# Patient Record
Sex: FEMALE | Race: AMERICAN INDIAN OR ALASKA NATIVE | Employment: UNEMPLOYED | URBAN - METROPOLITAN AREA
[De-identification: names, ages, dates, MRNs, and addresses within clinical notes are randomized per-mention and may not be internally consistent; named-entity substitution may affect disease eponyms.]

---

## 2017-01-11 ENCOUNTER — ROUTINE PRENATAL (OUTPATIENT)
Dept: OBGYN CLINIC | Age: 22
End: 2017-01-11

## 2017-01-11 VITALS — SYSTOLIC BLOOD PRESSURE: 100 MMHG | DIASTOLIC BLOOD PRESSURE: 58 MMHG | WEIGHT: 96.6 LBS | BODY MASS INDEX: 18.25 KG/M2

## 2017-01-11 DIAGNOSIS — Z34.02 SUPERVISION OF NORMAL FIRST PREGNANCY IN SECOND TRIMESTER: Primary | ICD-10-CM

## 2017-01-11 DIAGNOSIS — R31.9 HEMATURIA: ICD-10-CM

## 2017-01-11 DIAGNOSIS — O36.8120 DECREASED FETAL MOVEMENT, SECOND TRIMESTER, NOT APPLICABLE OR UNSPECIFIED FETUS: ICD-10-CM

## 2017-01-11 NOTE — PROGRESS NOTES
Problem List  Date Reviewed: 12/20/2016          Codes Class Noted    Encounter for supervision of normal first pregnancy ICD-10-CM: Z34.00  ICD-9-CM: V22.0  9/19/2016    Overview Addendum 11/18/2016  1:55 PM by Janell Ortega     EDC 4/8/2017 by [facility]-- E.  Coli at first visit  10/25/16 Flu vaccine given

## 2017-01-11 NOTE — PATIENT INSTRUCTIONS
Weeks 26 to 30 of Your Pregnancy: Care Instructions  Your Care Instructions    You are now in your last trimester of pregnancy. Your baby is growing rapidly. And you'll probably feel your baby moving around more often. Your doctor may ask you to count your baby's kicks. Your back may ache as your body gets used to your baby's size and length. If you haven't already had the Tdap shot during this pregnancy, talk to your doctor about getting it. It will help protect your  against pertussis infection. During this time, it's important to take care of yourself and pay attention to what your body needs. If you feel sexual, explore ways to be close with your partner that match your comfort and desire. Use the tips provided in this care sheet to find ways to be sexual in your own way. Follow-up care is a key part of your treatment and safety. Be sure to make and go to all appointments, and call your doctor if you are having problems. It's also a good idea to know your test results and keep a list of the medicines you take. How can you care for yourself at home? Take it easy at work  · Take frequent breaks. If possible, stop working when you are tired, and rest during your lunch hour. · Take bathroom breaks every 2 hours. · Change positions often. If you sit for long periods, stand up and walk around. · When you stand for a long time, keep one foot on a low stool with your knee bent. After standing a lot, sit with your feet up. · Avoid fumes, chemicals, and tobacco smoke. Be sexual in your own way  · Having sex during pregnancy is okay, unless your doctor tells you not to. · You may be very interested in sex, or you may have no interest at all. · Your growing belly can make it hard to find a good position during intercourse. [place] and explore. · You may get cramps in your uterus when your partner touches your breasts.   · A back rub may relieve the backache or cramps that sometimes follow orgasm. Learn about  labor  · Watch for signs of  labor. You may be going into labor if:  ¨ You have menstrual-like cramps, with or without nausea. ¨ You have about 4 or more contractions in 20 minutes, or about 8 or more within 1 hour, even after you have had a glass of water and are resting. ¨ You have a low, dull backache that does not go away when you change your position. ¨ You have pain or pressure in your pelvis that comes and goes in a pattern. ¨ You have intestinal cramping or flu-like symptoms, with or without diarrhea. ¨ You notice an increase or change in your vaginal discharge. Discharge may be heavy, mucus-like, watery, or streaked with blood. ¨ Your water breaks. · If you think you have  labor:  ¨ Drink 2 or 3 glasses of water or juice. Not drinking enough fluids can cause contractions. ¨ Stop what you are doing, and empty your bladder. Then lie down on your left side for at least 1 hour. ¨ While lying on your side, find your breast bone. Put your fingers in the soft spot just below it. Move your fingers down toward your belly button to find the top of your uterus. Check to see if it is tight. ¨ Contractions can be weak or strong. Record your contractions for an hour. Time a contraction from the start of one contraction to the start of the next one. ¨ Single or several strong contractions without a pattern are called Tripp-Murphy contractions. They are practice contractions but not the start of labor. They often stop if you change what you are doing. ¨ Call your doctor if you have regular contractions. Where can you learn more? Go to http://dorina-britt.info/. Enter X909 in the search box to learn more about \"Weeks 26 to 30 of Your Pregnancy: Care Instructions. \"  Current as of: May 30, 2016  Content Version: 11.1  © 5698-7914 [employer].  Care instructions adapted under license by [employer] (which disclaims liability or warranty for this information). If you have questions about a medical condition or this instruction, always ask your healthcare professional. [place] [address] any warranty or liability for your use of this information.

## 2017-01-11 NOTE — LETTER
Terra Kumar [address] [place] [phone] To Whom it may concern; 
 
 Terra Kumar is under my care. She was seen in our office today for a  
 
pregnancy problem. Following this appointment today, she may not return to work until after her next scheduled appointment on 1/17/17. Respectfully, Janell Ortega, Dr. Marlena Will' nurse

## 2017-01-11 NOTE — PROGRESS NOTES
[facility]  OFFICE PROCEDURE PROGRESS NOTE        Chart reviewed for the following:   Janell URRUTIA, have reviewed the History, Physical and updated the Allergic reactions for [address] performed immediately prior to start of procedure:   Janell URRUTIA, have performed the following reviews on Joey Hernandez prior to the start of the procedure:            * Patient was identified by name and date of birth   * Agreement on procedure being performed was verified  * Risks and Benefits explained to the patient  * Procedure site verified and marked as necessary  * Patient was positioned for comfort  * Consent was signed and verified     Time: 1pm      Date of procedure: 2017    Procedure performed by:  Roger Kawasaki, MD    Patient assisted by: self    How tolerated by patient: tolerated the procedure well with no complications    Post Procedural Pain Scale: 0 - No Hurt    Comments: none  NST procedure note    Joey Hernandez is a ,  23 y.o. female OTHER whose Patient's last menstrual period was 2016. was on  who presents for fetal non-stress test.    She is 27w4d and was monitored for 30 minutes and the FHR was reactive. NST Interpretation:    FHR baseline 150 bpm, variability moderate, accelerations present, decelerations Absent. Uterine contractions were absent. Yeni Martinez was informed of the NST results and her questions were answered. Disposition:    [x] return to clinic as scheduled   .

## 2017-01-11 NOTE — PROGRESS NOTES
EXTRA VISIT:  Complains of lower abdominal cramping, lasts few seconds at a time since last night. Baby moving - moved in office while waiting.  No bleeding  Cervix 1cm  Send urine cx  RTO for fFN  Stop work, pelvic rest

## 2017-01-12 LAB — GTT, 1 HR, GLUCOLA, EXTERNAL: NORMAL

## 2017-01-13 LAB — BACTERIA UR CULT: NO GROWTH

## 2017-01-17 ENCOUNTER — TELEPHONE (OUTPATIENT)
Dept: OBGYN CLINIC | Age: 22
End: 2017-01-17

## 2017-01-17 ENCOUNTER — ROUTINE PRENATAL (OUTPATIENT)
Dept: OBGYN CLINIC | Age: 22
End: 2017-01-17

## 2017-01-17 VITALS — SYSTOLIC BLOOD PRESSURE: 100 MMHG | WEIGHT: 98.2 LBS | BODY MASS INDEX: 18.55 KG/M2 | DIASTOLIC BLOOD PRESSURE: 58 MMHG

## 2017-01-17 DIAGNOSIS — Z3A.28 28 WEEKS GESTATION OF PREGNANCY: ICD-10-CM

## 2017-01-17 DIAGNOSIS — Z23 ENCOUNTER FOR IMMUNIZATION: ICD-10-CM

## 2017-01-17 DIAGNOSIS — Z34.03 SUPERVISION OF NORMAL FIRST PREGNANCY IN THIRD TRIMESTER: Primary | ICD-10-CM

## 2017-01-17 DIAGNOSIS — O34.30: ICD-10-CM

## 2017-01-17 LAB
HCT, EXTERNAL: 36.8
HGB, EXTERNAL: 12.5
PLATELET CNT,   EXTERNAL: 327

## 2017-01-17 RX ORDER — TERCONAZOLE 8 MG/G
1 CREAM VAGINAL
Qty: 20 G | Refills: 0 | Status: SHIPPED | OUTPATIENT
Start: 2017-01-17 | End: 2017-02-02

## 2017-01-17 NOTE — PROGRESS NOTES
Problem List  Date Reviewed: 1/11/2017          Codes Class Noted    Encounter for supervision of normal first pregnancy ICD-10-CM: Z34.00  ICD-9-CM: V22.0  9/19/2016    Overview Addendum 11/18/2016  1:55 PM by Janell Ortega     EDC 4/8/2017 by [facility]-- E.  Coli at first visit  10/25/16 Flu vaccine given

## 2017-01-17 NOTE — PROGRESS NOTES
tdap today. Denies contractions. Baby moving fFN done. Disc peds, consent classes  US 62%tile, nl AFV, vtx    Cervix unchanged.  Yeast in vagina - terazol 3

## 2017-01-17 NOTE — PATIENT INSTRUCTIONS
Weeks 26 to 30 of Your Pregnancy: Care Instructions  Your Care Instructions    You are now in your last trimester of pregnancy. Your baby is growing rapidly. And you'll probably feel your baby moving around more often. Your doctor may ask you to count your baby's kicks. Your back may ache as your body gets used to your baby's size and length. If you haven't already had the Tdap shot during this pregnancy, talk to your doctor about getting it. It will help protect your  against pertussis infection. During this time, it's important to take care of yourself and pay attention to what your body needs. If you feel sexual, explore ways to be close with your partner that match your comfort and desire. Use the tips provided in this care sheet to find ways to be sexual in your own way. Follow-up care is a key part of your treatment and safety. Be sure to make and go to all appointments, and call your doctor if you are having problems. It's also a good idea to know your test results and keep a list of the medicines you take. How can you care for yourself at home? Take it easy at work  · Take frequent breaks. If possible, stop working when you are tired, and rest during your lunch hour. · Take bathroom breaks every 2 hours. · Change positions often. If you sit for long periods, stand up and walk around. · When you stand for a long time, keep one foot on a low stool with your knee bent. After standing a lot, sit with your feet up. · Avoid fumes, chemicals, and tobacco smoke. Be sexual in your own way  · Having sex during pregnancy is okay, unless your doctor tells you not to. · You may be very interested in sex, or you may have no interest at all. · Your growing belly can make it hard to find a good position during intercourse. [place] and explore. · You may get cramps in your uterus when your partner touches your breasts.   · A back rub may relieve the backache or cramps that sometimes follow orgasm. Learn about  labor  · Watch for signs of  labor. You may be going into labor if:  ¨ You have menstrual-like cramps, with or without nausea. ¨ You have about 4 or more contractions in 20 minutes, or about 8 or more within 1 hour, even after you have had a glass of water and are resting. ¨ You have a low, dull backache that does not go away when you change your position. ¨ You have pain or pressure in your pelvis that comes and goes in a pattern. ¨ You have intestinal cramping or flu-like symptoms, with or without diarrhea. ¨ You notice an increase or change in your vaginal discharge. Discharge may be heavy, mucus-like, watery, or streaked with blood. ¨ Your water breaks. · If you think you have  labor:  ¨ Drink 2 or 3 glasses of water or juice. Not drinking enough fluids can cause contractions. ¨ Stop what you are doing, and empty your bladder. Then lie down on your left side for at least 1 hour. ¨ While lying on your side, find your breast bone. Put your fingers in the soft spot just below it. Move your fingers down toward your belly button to find the top of your uterus. Check to see if it is tight. ¨ Contractions can be weak or strong. Record your contractions for an hour. Time a contraction from the start of one contraction to the start of the next one. ¨ Single or several strong contractions without a pattern are called Tripp-Murphy contractions. They are practice contractions but not the start of labor. They often stop if you change what you are doing. ¨ Call your doctor if you have regular contractions. Where can you learn more? Go to http://dorina-britt.info/. Enter A177 in the search box to learn more about \"Weeks 26 to 30 of Your Pregnancy: Care Instructions. \"  Current as of: May 30, 2016  Content Version: 11.1  © 0825-7831 [employer].  Care instructions adapted under license by [employer] (which disclaims liability or warranty for this information). If you have questions about a medical condition or this instruction, always ask your healthcare professional. [place] [address] any warranty or liability for your use of this information.

## 2017-01-17 NOTE — TELEPHONE ENCOUNTER
23year old  28w3d pregnant patient last seen in the office on today. Zakia from lab nnamdi calling to report the patient has a positive fetal fibronectin. This nurse confirmed the office fax number to have the lab faxed to the office. Lab has been faxed to office and scanned into chart. MD notified.

## 2017-01-17 NOTE — PROGRESS NOTES
Administered TDAP vaccine per MD order. Injection given IM in right deltoid, patient consent signed.  Patient tolerated well, no complications

## 2017-01-18 LAB
ERYTHROCYTE [DISTWIDTH] IN BLOOD BY AUTOMATED COUNT: 12.3 % (ref 12.3–15.4)
FIBRONECTIN FETAL VAG QL: POSITIVE
GLUCOSE 1H P 50 G GLC PO SERPL-MCNC: 86 MG/DL (ref 65–139)
HCT VFR BLD AUTO: 36.8 % (ref 34–46.6)
HGB BLD-MCNC: 12.5 G/DL (ref 11.1–15.9)
MCH RBC QN AUTO: 28.7 PG (ref 26.6–33)
MCHC RBC AUTO-ENTMCNC: 34 G/DL (ref 31.5–35.7)
MCV RBC AUTO: 84 FL (ref 79–97)
PLATELET # BLD AUTO: 327 X10E3/UL (ref 150–379)
RBC # BLD AUTO: 4.36 X10E6/UL (ref 3.77–5.28)
WBC # BLD AUTO: 12 X10E3/UL (ref 3.4–10.8)

## 2017-01-24 ENCOUNTER — ROUTINE PRENATAL (OUTPATIENT)
Dept: OBGYN CLINIC | Age: 22
End: 2017-01-24

## 2017-01-24 ENCOUNTER — HOSPITAL ENCOUNTER (INPATIENT)
Age: 22
LOS: 2 days | Discharge: HOME | DRG: 778 | End: 2017-01-26
Attending: OBSTETRICS & GYNECOLOGY | Admitting: OBSTETRICS & GYNECOLOGY
Payer: COMMERCIAL

## 2017-01-24 VITALS — SYSTOLIC BLOOD PRESSURE: 98 MMHG | DIASTOLIC BLOOD PRESSURE: 58 MMHG | WEIGHT: 98.4 LBS | BODY MASS INDEX: 18.59 KG/M2

## 2017-01-24 DIAGNOSIS — R87.89 POSITIVE FETAL FIBRONECTIN AT 22 WEEKS TO 34 WEEKS GESTATION: ICD-10-CM

## 2017-01-24 DIAGNOSIS — O09.899 POSITIVE FETAL FIBRONECTIN AT 22 WEEKS TO 34 WEEKS GESTATION: ICD-10-CM

## 2017-01-24 DIAGNOSIS — Z34.03 SUPERVISION OF NORMAL FIRST PREGNANCY IN THIRD TRIMESTER: Primary | ICD-10-CM

## 2017-01-24 LAB
ERYTHROCYTE [DISTWIDTH] IN BLOOD BY AUTOMATED COUNT: 12.4 % (ref 11.5–14.5)
HCT VFR BLD AUTO: 35.2 % (ref 35–47)
HGB BLD-MCNC: 11.8 G/DL (ref 11.5–16)
MCH RBC QN AUTO: 28.3 PG (ref 26–34)
MCHC RBC AUTO-ENTMCNC: 33.5 G/DL (ref 30–36.5)
MCV RBC AUTO: 84.4 FL (ref 80–99)
PLATELET # BLD AUTO: 299 K/UL (ref 150–400)
RBC # BLD AUTO: 4.17 M/UL (ref 3.8–5.2)
WBC # BLD AUTO: 12.5 K/UL (ref 3.6–11)

## 2017-01-24 PROCEDURE — 36415 COLL VENOUS BLD VENIPUNCTURE: CPT | Performed by: OBSTETRICS & GYNECOLOGY

## 2017-01-24 PROCEDURE — 77030034850

## 2017-01-24 PROCEDURE — 86900 BLOOD TYPING SEROLOGIC ABO: CPT | Performed by: OBSTETRICS & GYNECOLOGY

## 2017-01-24 PROCEDURE — 74011000258 HC RX REV CODE- 258: Performed by: OBSTETRICS & GYNECOLOGY

## 2017-01-24 PROCEDURE — 99218 HC RM OBSERVATION: CPT

## 2017-01-24 PROCEDURE — 87081 CULTURE SCREEN ONLY: CPT | Performed by: OBSTETRICS & GYNECOLOGY

## 2017-01-24 PROCEDURE — 99283 EMERGENCY DEPT VISIT LOW MDM: CPT | Performed by: OBSTETRICS & GYNECOLOGY

## 2017-01-24 PROCEDURE — 59025 FETAL NON-STRESS TEST: CPT | Performed by: OBSTETRICS & GYNECOLOGY

## 2017-01-24 PROCEDURE — 74011250636 HC RX REV CODE- 250/636: Performed by: OBSTETRICS & GYNECOLOGY

## 2017-01-24 PROCEDURE — 85027 COMPLETE CBC AUTOMATED: CPT | Performed by: OBSTETRICS & GYNECOLOGY

## 2017-01-24 PROCEDURE — 65270000029 HC RM PRIVATE

## 2017-01-24 PROCEDURE — 96361 HYDRATE IV INFUSION ADD-ON: CPT | Performed by: OBSTETRICS & GYNECOLOGY

## 2017-01-24 PROCEDURE — 51702 INSERT TEMP BLADDER CATH: CPT

## 2017-01-24 PROCEDURE — 96360 HYDRATION IV INFUSION INIT: CPT | Performed by: OBSTETRICS & GYNECOLOGY

## 2017-01-24 RX ORDER — SODIUM CHLORIDE 0.9 % (FLUSH) 0.9 %
5-10 SYRINGE (ML) INJECTION AS NEEDED
Status: DISCONTINUED | OUTPATIENT
Start: 2017-01-24 | End: 2017-01-26 | Stop reason: HOSPADM

## 2017-01-24 RX ORDER — ACETAMINOPHEN 325 MG/1
650 TABLET ORAL
Status: DISCONTINUED | OUTPATIENT
Start: 2017-01-24 | End: 2017-01-26 | Stop reason: HOSPADM

## 2017-01-24 RX ORDER — MAGNESIUM SULFATE HEPTAHYDRATE 40 MG/ML
4 INJECTION, SOLUTION INTRAVENOUS ONCE
Status: COMPLETED | OUTPATIENT
Start: 2017-01-24 | End: 2017-01-24

## 2017-01-24 RX ORDER — SODIUM CHLORIDE, SODIUM LACTATE, POTASSIUM CHLORIDE, CALCIUM CHLORIDE 600; 310; 30; 20 MG/100ML; MG/100ML; MG/100ML; MG/100ML
999 INJECTION, SOLUTION INTRAVENOUS ONCE
Status: COMPLETED | OUTPATIENT
Start: 2017-01-24 | End: 2017-01-24

## 2017-01-24 RX ORDER — SODIUM CHLORIDE 0.9 % (FLUSH) 0.9 %
5-10 SYRINGE (ML) INJECTION EVERY 8 HOURS
Status: DISCONTINUED | OUTPATIENT
Start: 2017-01-24 | End: 2017-01-26 | Stop reason: HOSPADM

## 2017-01-24 RX ORDER — SODIUM CHLORIDE, SODIUM LACTATE, POTASSIUM CHLORIDE, CALCIUM CHLORIDE 600; 310; 30; 20 MG/100ML; MG/100ML; MG/100ML; MG/100ML
125 INJECTION, SOLUTION INTRAVENOUS ONCE
Status: COMPLETED | OUTPATIENT
Start: 2017-01-24 | End: 2017-01-24

## 2017-01-24 RX ORDER — BETAMETHASONE SODIUM PHOSPHATE AND BETAMETHASONE ACETATE 3; 3 MG/ML; MG/ML
12 INJECTION, SUSPENSION INTRA-ARTICULAR; INTRALESIONAL; INTRAMUSCULAR; SOFT TISSUE EVERY 12 HOURS
Status: DISCONTINUED | OUTPATIENT
Start: 2017-01-24 | End: 2017-01-25 | Stop reason: SDUPTHER

## 2017-01-24 RX ADMIN — SODIUM CHLORIDE, SODIUM LACTATE, POTASSIUM CHLORIDE, AND CALCIUM CHLORIDE 999 ML/HR: 600; 310; 30; 20 INJECTION, SOLUTION INTRAVENOUS at 16:24

## 2017-01-24 RX ADMIN — SODIUM CHLORIDE, SODIUM LACTATE, POTASSIUM CHLORIDE, AND CALCIUM CHLORIDE 125 ML/HR: 600; 310; 30; 20 INJECTION, SOLUTION INTRAVENOUS at 17:21

## 2017-01-24 RX ADMIN — BETAMETHASONE ACETATE AND BETAMETHASONE SODIUM PHOSPHATE 12 MG: 3; 3 INJECTION, SUSPENSION INTRA-ARTICULAR; INTRALESIONAL; INTRAMUSCULAR; SOFT TISSUE at 20:12

## 2017-01-24 RX ADMIN — MAGNESIUM SULFATE HEPTAHYDRATE 4 G: 40 INJECTION, SOLUTION INTRAVENOUS at 20:10

## 2017-01-24 RX ADMIN — AMPICILLIN SODIUM 2 G: 2 INJECTION, POWDER, FOR SOLUTION INTRAMUSCULAR; INTRAVENOUS at 20:10

## 2017-01-24 RX ADMIN — Medication 2 G/HR: at 20:33

## 2017-01-24 NOTE — PROGRESS NOTES
1/24/2017 2:26 PM  Patient presents from office with a +FFN result. Patient denies any recent intercourse, LOF or bleeding. Patient states she has +FM. Pt denies any complications with this current pregnancy. Patient oriented to room and POC and agrees. Pt given pitcher full of water and encouraged to PO hydrate. 1/24/2017 3:58 PM  Patient asking when/if she will be discharged. Paged Dr. Celestino Isabel. 1/24/2017 1605  MD at bedside assessing FHR and ctx pattern. Per MD, start IV LR bolus at 999mL/hr x1 time. Verbal order repeated and verified. IV started in R arm, CBC and sample sent to lab per MD order. Patient agrees to [address] and understands she is to stay overnight. MD will decide about betamethasone after initial bolus finished. Patient continues to PO hydrate at this time. 1/24/2017 5:23 PM  Bedside and Verbal shift change report given to Isela Rushing RN (oncoming nurse) by Froylan NUÑEZ (offgoing nurse). Report included the following information SBAR, Intake/Output, MAR, Recent Results and Med Rec Status.

## 2017-01-24 NOTE — IP AVS SNAPSHOT
Current Discharge Medication List  
  
ASK your doctor about these medications Dose & Instructions Dispensing Information Comments Morning Noon Evening Bedtime  
 prenatal multivit-ca-min-fe-fa Tab Your next dose is: Today, Tomorrow Other:  ____________ Take  by mouth. Refills:  0  
     
   
   
   
  
 terconazole 0.8 % vaginal cream  
Commonly known as:  TERAZOL 3 Your next dose is: Today, Tomorrow Other:  ____________ Dose:  1 Applicator Insert 1 Applicator into vagina nightly. Quantity:  20 g Refills:  0

## 2017-01-24 NOTE — IP AVS SNAPSHOT
Kirsten Zepeda 
 
 
 [address] [facility] 
[phone] Patient: Theresa Live MRN: [id number] UJN: You are allergic to the following No active allergies Recent Documentation Height Weight Breastfeeding? BMI OB Status Smoking Status 1.549 m 44.6 kg No 18.59 kg/m2 Pregnant Never Smoker Unresulted Labs Order Current Status SAMPLE TO BLOOD BANK In process CULTURE, GENITAL GROUP B STREP Preliminary result Emergency Contacts Name Discharge Info Relation Home Work Mobile [facility] 126  Parent [1] [phone] About your hospitalization You were admitted on:  2017 You last received care in the:  [facility] 2 LABOR & DELIVERY You were discharged on:  2017 Unit phone number:  [phone] Why you were hospitalized Your primary diagnosis was:  Not on File Your diagnoses also included:   Labor In Second Trimester With  Delivery In Second Trimester Providers Seen During Your Hospitalizations Provider Role Specialty Primary office phone Jimmy Mcknight MD Attending Provider Obstetrics & Gynecology [phone] Your Primary Care Physician (PCP) Primary Care Physician Office Phone Office Fax Marques Weaver [phone] Follow-up Information None Your Appointments 2017  9:30 AM EST  
OB VISIT with Jimmy Mcknight MD  
[facility] ([facility]) [address] [facility]  
[phone] Current Discharge Medication List  
  
ASK your doctor about these medications Dose & Instructions Dispensing Information Comments Morning Noon Evening Bedtime  
 prenatal multivit-ca-min-fe-fa Tab Your next dose is: Today, Tomorrow Other:  _________ Take  by mouth. Refills:  0 terconazole 0.8 % vaginal cream  
Commonly known as:  TERAZOL 3 Your next dose is: Today, Tomorrow Other:  _________ Dose:  1 Applicator Insert 1 Applicator into vagina nightly. Quantity:  20 g Refills:  0 Discharge Instructions Weeks 26 to 30 of Your Pregnancy: Care Instructions Your Care Instructions You are now in your last trimester of pregnancy. Your baby is growing rapidly. And you'll probably feel your baby moving around more often. Your doctor may ask you to count your baby's kicks. Your back may ache as your body gets used to your baby's size and length. If you haven't already had the Tdap shot during this pregnancy, talk to your doctor about getting it. It will help protect your  against pertussis infection. During this time, it's important to take care of yourself and pay attention to what your body needs. If you feel sexual, explore ways to be close with your partner that match your comfort and desire. Use the tips provided in this care sheet to find ways to be sexual in your own way. Follow-up care is a key part of your treatment and safety. Be sure to make and go to all appointments, and call your doctor if you are having problems. It's also a good idea to know your test results and keep a list of the medicines you take. How can you care for yourself at home? Take it easy at work · Take frequent breaks. If possible, stop working when you are tired, and rest during your lunch hour. · Take bathroom breaks every 2 hours. · Change positions often. If you sit for long periods, stand up and walk around. · When you stand for a long time, keep one foot on a low stool with your knee bent. After standing a lot, sit with your feet up. · Avoid fumes, chemicals, and tobacco smoke. Be sexual in your own way · Having sex during pregnancy is okay, unless your doctor tells you not to. · You may be very interested in sex, or you may have no interest at all. · Your growing belly can make it hard to find a good position during intercourse. [place] and explore. · You may get cramps in your uterus when your partner touches your breasts. · A back rub may relieve the backache or cramps that sometimes follow orgasm. Learn about  labor · Watch for signs of  labor. You may be going into labor if: 
¨ You have menstrual-like cramps, with or without nausea. ¨ You have about 4 or more contractions in 20 minutes, or about 8 or more within 1 hour, even after you have had a glass of water and are resting. ¨ You have a low, dull backache that does not go away when you change your position. ¨ You have pain or pressure in your pelvis that comes and goes in a pattern. ¨ You have intestinal cramping or flu-like symptoms, with or without diarrhea. ¨ You notice an increase or change in your vaginal discharge. Discharge may be heavy, mucus-like, watery, or streaked with blood. ¨ Your water breaks. · If you think you have  labor: ¨ Drink 2 or 3 glasses of water or juice. Not drinking enough fluids can cause contractions. ¨ Stop what you are doing, and empty your bladder. Then lie down on your left side for at least 1 hour. ¨ While lying on your side, find your breast bone. Put your fingers in the soft spot just below it. Move your fingers down toward your belly button to find the top of your uterus. Check to see if it is tight. ¨ Contractions can be weak or strong. Record your contractions for an hour. Time a contraction from the start of one contraction to the start of the next one. ¨ Single or several strong contractions without a pattern are called Brawley-Murphy contractions. They are practice contractions but not the start of labor. They often stop if you change what you are doing. ¨ Call your doctor if you have regular contractions. Where can you learn more? Go to http://dorina-britt.info/. Enter B674 in the search box to learn more about \"Weeks 26 to 30 of Your Pregnancy: Care Instructions. \" Current as of: May 30, 2016 Content Version: 11.1 © 0101-4418 Jdguanjia. Care instructions adapted under license by Chrends (which disclaims liability or warranty for this information). If you have questions about a medical condition or this instruction, always ask your healthcare professional. [place] [address] any warranty or liability for your use of this information. Counting Your Baby's Kicks: Care Instructions Your Care Instructions Counting your baby's kicks is one way your doctor can tell that your baby is healthy. Most womenespecially in a first pregnancyfeel their baby move for the first time between 16 and 22 weeks. The movement may feel like flutters rather than kicks. Your baby may move more at certain times of the day. When you are active, you may notice less kicking than when you are resting. At your prenatal visits, your doctor will ask whether the baby is active. In your last trimester, your doctor may ask you to count the number of times you feel your baby move. Follow-up care is a key part of your treatment and safety. Be sure to make and go to all appointments, and call your doctor if you are having problems. It's also a good idea to know your test results and keep a list of the medicines you take. How do you count fetal kicks? · A common method of checking your baby's movement is to count the number of kicks or moves you feel in 1 hour. Ten movements (such as kicks, flutters, or rolls) in 1 hour are normal. Some doctors suggest that you count in the morning until you get to 10 movements. Then you can quit for that day and start again the next day. · Pick your baby's most active time of day to count. This may be any time from morning to evening. · If you do not feel 10 movements in an hour, your baby may be sleeping. Wait for the next hour and count again. When should you call for help? Call your doctor now or seek immediate medical care if: 
· You noticed that your baby has stopped moving or is moving much less than normal. 
Watch closely for changes in your health, and be sure to contact your doctor if you have any problems. Where can you learn more? Go to http://dorina-britt.info/. Enter Z947 in the search box to learn more about \"Counting Your Baby's Kicks: Care Instructions. \" Current as of: May 30, 2016 Content Version: 11.1 © 0830-8824 Vestmark. Care instructions adapted under license by Moovit (which disclaims liability or warranty for this information). If you have questions about a medical condition or this instruction, always ask your healthcare professional. [place] [address] any warranty or liability for your use of this information. [facility] Contractions: Care Instructions Your Care Instructions Tripp Murphy contractions prepare your uterus for labor. Think of them as a \"warm-up\" exercise that your body does. You may begin to feel them between the 28th and 30th weeks of your pregnancy. But they start as early as the 20th week. [place] Murphy contractions usually occur more often during the ninth month. They may go away when you are active and return when you rest. These contractions are like mild contractions of true labor, but they occur less often. (You feel fewer than 8 in an hour.) They don't cause your cervix to open. It may be hard for you to tell the difference between [facility] contractions and true labor, especially in your first pregnancy. Follow-up care is a key part of your treatment and safety.  Be sure to make and go to all appointments, and call your doctor if you are having problems. It's also a good idea to know your test results and keep a list of the medicines you take. How can you care for yourself at home? · Try a warm bath to help relieve muscle tension and reduce pain. · Change positions every 30 minutes. Take breaks if you must sit for a long time. Get up and walk around. · Drink plenty of water, enough so that your urine is light yellow or clear like water. · Taking short walks may help you feel better. Your doctor needs to check any contractions that are getting stronger or closer together. Where can you learn more? Go to http://dorinaClan Fightbritt.info/. Enter [id number] in the search box to learn more about \"[employer] Murphy Contractions: Care Instructions. \" Current as of: May 30, 2016 Content Version: 11.1 © 7950-3891 Deliv. Care instructions adapted under license by QVOD Technology (which disclaims liability or warranty for this information). If you have questions about a medical condition or this instruction, always ask your healthcare professional. [place] [address] any warranty or liability for your use of this information. Basilia Langston  Labor: Care Instructions Your Care Instructions  labor is the start of labor between 20 and 36 weeks of pregnancy. A full-term pregnancy lasts 37 to 42 weeks. In labor, the uterus contracts to open the cervix. This is the first stage of childbirth.  labor can be caused by a problem with the baby, the mother, or both. Often the cause is not known. In some cases, doctors use medicines to try to delay labor until 29 or more weeks of pregnancy. By this time, a baby has grown enough so that problems are not likely. In some casessuch as with a serious infectionit is healthier for the baby to be born early. Your treatment will depend on how far along you are in your pregnancy and on your health and your baby's health. Follow-up care is a key part of your treatment and safety. Be sure to make and go to all appointments, and call your doctor if you are having problems. It's also a good idea to know your test results and keep a list of the medicines you take. How can you care for yourself at home? · If your doctor prescribed medicines, take them exactly as directed. Call your doctor if you think you are having a problem with your medicine. · Rest until your doctor advises you about activity. He or she will tell you if you should stay in bed most of the time. You may need to arrange for  if you have young children. · Do not have sexual intercourse unless your doctor says it is safe. · Use pads, not tampons, if you have vaginal bleeding. · Make sure to drink plenty of fluids. Dehydration can lead to contractions. If you have kidney, heart, or liver disease and have to limit fluids, talk with your doctor before you increase the amount of fluids you drink. · Do not smoke or allow others to smoke around you. If you need help quitting, talk to your doctor about stop-smoking programs and medicines. These can increase your chances of quitting for good. When should you call for help? Call 911 anytime you think you may need emergency care. For example, call if: 
· You passed out (lost consciousness). · You have severe vaginal bleeding. · You have severe pain in your belly or pelvis. · You have had fluid gushing or leaking from your vagina and you know or think the umbilical cord is bulging into your vagina. If this happens, immediately get down on your knees so your rear end (buttocks) is higher than your head. This will decrease the pressure on the cord until help arrives. Call your doctor now or seek immediate medical care if: 
· You have signs of preeclampsia, such as: 
¨ Sudden swelling of your face, hands, or feet. ¨ New vision problems (such as dimness or blurring). ¨ A severe headache. · You have any vaginal bleeding. · You have belly pain or cramping. · You have a fever. · You have had regular contractions (with or without pain) for an hour. This means that you have 8 or more within 1 hour or 4 or more in 20 minutes after you change your position and drink fluids. · You have a sudden release of fluid from the vagina. · You have low back pain or pelvic pressure that does not go away. · You notice that your baby has stopped moving or is moving much less than normal. 
Watch closely for changes in your health, and be sure to contact your doctor if you have any problems. Where can you learn more? Go to http://dorina-britt.info/. Enter Q400 in the search box to learn more about \" Labor: Care Instructions. \" Current as of: May 30, 2016 Content Version: 11.1 © 4305-3462 Chip Estimate. Care instructions adapted under license by Mainstream Renewable Power (which disclaims liability or warranty for this information). If you have questions about a medical condition or this instruction, always ask your healthcare professional. [address] any warranty or liability for your use of this information. Follow up with Dr. Santos Blevins on 2017 as scheduled. Discharge Orders None Introducing \[facility] [place] [facility]\"" & HEALTH SERVICES! Dear Yashira Casas: Thank you for requesting a [facility] account. Our records indicate that you already have an active [facility] account. You can access your account anytime at [email or web address] Did you know that you can access your hospital and ER discharge instructions at any time in [facility]? You can also review all of your test results from your hospital stay or ER visit. Additional Information If you have questions, please visit the Frequently Asked Questions section of the [employer] website at [email or web address]/. Remember, MyChart is NOT to be used for urgent needs. For medical emergencies, dial 911. Now available from your iPhone and Android! General Information Please provide this summary of care documentation to your next provider. Patient Signature:  ____________________________________________________________ Date:  ____________________________________________________________  
  
Suzon Mems Provider Signature:  ____________________________________________________________ Date:  ____________________________________________________________

## 2017-01-24 NOTE — PROGRESS NOTES
Problem List  Date Reviewed: 1/17/2017          Codes Class Noted    Encounter for supervision of normal first pregnancy ICD-10-CM: Z34.00  ICD-9-CM: V22.0  9/19/2016    Overview Addendum 1/18/2017  4:14 PM by Janell Ortega     EDC 4/8/2017 by [facility]-- E.  Coli at first visit  10/25/16 Flu vaccine given  FFN + @ 28wks

## 2017-01-24 NOTE — PATIENT INSTRUCTIONS
Weeks 26 to 30 of Your Pregnancy: Care Instructions  Your Care Instructions    You are now in your last trimester of pregnancy. Your baby is growing rapidly. And you'll probably feel your baby moving around more often. Your doctor may ask you to count your baby's kicks. Your back may ache as your body gets used to your baby's size and length. If you haven't already had the Tdap shot during this pregnancy, talk to your doctor about getting it. It will help protect your  against pertussis infection. During this time, it's important to take care of yourself and pay attention to what your body needs. If you feel sexual, explore ways to be close with your partner that match your comfort and desire. Use the tips provided in this care sheet to find ways to be sexual in your own way. Follow-up care is a key part of your treatment and safety. Be sure to make and go to all appointments, and call your doctor if you are having problems. It's also a good idea to know your test results and keep a list of the medicines you take. How can you care for yourself at home? Take it easy at work  · Take frequent breaks. If possible, stop working when you are tired, and rest during your lunch hour. · Take bathroom breaks every 2 hours. · Change positions often. If you sit for long periods, stand up and walk around. · When you stand for a long time, keep one foot on a low stool with your knee bent. After standing a lot, sit with your feet up. · Avoid fumes, chemicals, and tobacco smoke. Be sexual in your own way  · Having sex during pregnancy is okay, unless your doctor tells you not to. · You may be very interested in sex, or you may have no interest at all. · Your growing belly can make it hard to find a good position during intercourse. [place] and explore. · You may get cramps in your uterus when your partner touches your breasts.   · A back rub may relieve the backache or cramps that sometimes follow orgasm. Learn about  labor  · Watch for signs of  labor. You may be going into labor if:  ¨ You have menstrual-like cramps, with or without nausea. ¨ You have about 4 or more contractions in 20 minutes, or about 8 or more within 1 hour, even after you have had a glass of water and are resting. ¨ You have a low, dull backache that does not go away when you change your position. ¨ You have pain or pressure in your pelvis that comes and goes in a pattern. ¨ You have intestinal cramping or flu-like symptoms, with or without diarrhea. ¨ You notice an increase or change in your vaginal discharge. Discharge may be heavy, mucus-like, watery, or streaked with blood. ¨ Your water breaks. · If you think you have  labor:  ¨ Drink 2 or 3 glasses of water or juice. Not drinking enough fluids can cause contractions. ¨ Stop what you are doing, and empty your bladder. Then lie down on your left side for at least 1 hour. ¨ While lying on your side, find your breast bone. Put your fingers in the soft spot just below it. Move your fingers down toward your belly button to find the top of your uterus. Check to see if it is tight. ¨ Contractions can be weak or strong. Record your contractions for an hour. Time a contraction from the start of one contraction to the start of the next one. ¨ Single or several strong contractions without a pattern are called Tripp-Murphy contractions. They are practice contractions but not the start of labor. They often stop if you change what you are doing. ¨ Call your doctor if you have regular contractions. Where can you learn more? Go to http://dorina-britt.info/. Enter Z037 in the search box to learn more about \"Weeks 26 to 30 of Your Pregnancy: Care Instructions. \"  Current as of: May 30, 2016  Content Version: 11.1  © 7169-4525 Biomeme, Clickberry.  Care instructions adapted under license by Raw Science Inc. (which disclaims liability or warranty for this information). If you have questions about a medical condition or this instruction, always ask your healthcare professional. [place] [address] any warranty or liability for your use of this information.

## 2017-01-24 NOTE — H&P
History & Physical    Name: Codey Santillan MRN: [id number]  SSN: [id number]    YOB: 1995  Age: 23 y.o. Sex: female        Subjective:     Estimated Date of Delivery: 17  OB History      Para Term  AB TAB SAB Ectopic Multiple Living    1                   Ms. Frances Hernandez is admitted with pregnancy at 29w3d for premature dilatation and pos fFN. Prenatal course was complicated by poor materanal BMI, PCD. Please see prenatal records for details. No past medical history on file. No past surgical history on file. Social History     Occupational History    Not on file. Social History Main Topics    Smoking status: Never Smoker    Smokeless tobacco: Never Used    Alcohol use No    Drug use: No    Sexual activity: Yes     Partners: Male     Birth control/ protection: None      Comment: Pregnancy     No family history on file. No Known Allergies  Prior to Admission medications    Medication Sig Start Date End Date Taking? Authorizing Provider   terconazole (TERAZOL 3) 0.8 % vaginal cream Insert 1 Applicator into vagina nightly. 17  Yes Yuli Scruggs MD   prenatal multivit-ca-min-fe-fa tab Take  by mouth. Yes Historical Provider        Review of Systems: A comprehensive review of systems was negative except for that written in the HPI.     Objective:     Vitals:  Vitals:    17 1408   BP: 107/59   Pulse: 82   Resp: 16   Temp: 98.6 °F (37 °C)   Weight: 98 lb 6.4 oz (44.6 kg)   Height: 5' 1\" (1.549 m)        Physical Exam:  Heart: Regular rate and rhythm  Lung: normal respiratory effort  Abdomen: soft, nontender  Fundus: soft and non tender  Perineum: blood absent, amniotic fluid absent  Cervical Exam:   2 cm dilated    70% effaced    -3 station    Presenting Part: cephalic  Cervical Position: mid position  Consistency: Soft  Membranes:  Intact  Fetal Heart Rate: Reactive    Prenatal Labs:   Lab Results   Component Value Date/Time    Rubella, External immune 2016 HBsAg, External neg 08/29/2016    HIV, External neg 08/29/2016    Gonorrhea, External neg 08/29/2016    Chlamydia, External neg 08/29/2016        Assessment/Plan:     Plan: Admit for PCD, pos fFN, r/o PTL. Group B Strep was not tested. She is having irregular contractions which she does not feel. Will give IV bolus now. Plan BMZ if contractions continue after conservative measures. Disc with [facility].     Signed By:  John Giron MD     January 24, 2017

## 2017-01-25 LAB
ABO + RH BLD: NORMAL
BLOOD GROUP ANTIBODIES SERPL: NORMAL
SPECIMEN EXP DATE BLD: NORMAL

## 2017-01-25 PROCEDURE — 65270000029 HC RM PRIVATE

## 2017-01-25 PROCEDURE — 74011250636 HC RX REV CODE- 250/636: Performed by: OBSTETRICS & GYNECOLOGY

## 2017-01-25 PROCEDURE — 74011000258 HC RX REV CODE- 258: Performed by: OBSTETRICS & GYNECOLOGY

## 2017-01-25 PROCEDURE — 74011250636 HC RX REV CODE- 250/636

## 2017-01-25 RX ORDER — BETAMETHASONE SODIUM PHOSPHATE AND BETAMETHASONE ACETATE 3; 3 MG/ML; MG/ML
12 INJECTION, SUSPENSION INTRA-ARTICULAR; INTRALESIONAL; INTRAMUSCULAR; SOFT TISSUE ONCE
Status: COMPLETED | OUTPATIENT
Start: 2017-01-25 | End: 2017-01-25

## 2017-01-25 RX ORDER — SODIUM CHLORIDE, SODIUM LACTATE, POTASSIUM CHLORIDE, CALCIUM CHLORIDE 600; 310; 30; 20 MG/100ML; MG/100ML; MG/100ML; MG/100ML
125 INJECTION, SOLUTION INTRAVENOUS CONTINUOUS
Status: DISCONTINUED | OUTPATIENT
Start: 2017-01-25 | End: 2017-01-26 | Stop reason: HOSPADM

## 2017-01-25 RX ADMIN — Medication 1 G/HR: at 08:08

## 2017-01-25 RX ADMIN — BETAMETHASONE ACETATE AND BETAMETHASONE SODIUM PHOSPHATE 12 MG: 3; 3 INJECTION, SUSPENSION INTRA-ARTICULAR; INTRALESIONAL; INTRAMUSCULAR; SOFT TISSUE at 20:11

## 2017-01-25 RX ADMIN — AMPICILLIN SODIUM 1 G: 1 INJECTION, POWDER, FOR SOLUTION INTRAMUSCULAR; INTRAVENOUS at 00:50

## 2017-01-25 RX ADMIN — SODIUM CHLORIDE, SODIUM LACTATE, POTASSIUM CHLORIDE, AND CALCIUM CHLORIDE 125 ML/HR: 600; 310; 30; 20 INJECTION, SOLUTION INTRAVENOUS at 06:25

## 2017-01-25 RX ADMIN — AMPICILLIN SODIUM 1 G: 1 INJECTION, POWDER, FOR SOLUTION INTRAMUSCULAR; INTRAVENOUS at 04:50

## 2017-01-25 RX ADMIN — SODIUM CHLORIDE, SODIUM LACTATE, POTASSIUM CHLORIDE, AND CALCIUM CHLORIDE 125 ML/HR: 600; 310; 30; 20 INJECTION, SOLUTION INTRAVENOUS at 18:57

## 2017-01-25 RX ADMIN — Medication 2 G/HR: at 02:06

## 2017-01-25 NOTE — PROGRESS NOTES
01/25/17 2:24 PM  IDR antepartum rounds were held on 01/25/17  with the following team members: Care Management, Nursing Leader, and Lactation Consultant. Patient's plan of care discussed, lactation options discussed, and discharge planning needs reviewed.   GRACIE Link

## 2017-01-25 NOTE — PROGRESS NOTES
01/25/17 11:48 AM  CM met with patient to complete initial assessment and to begin discharge planning. Patient's charted information was reviewed and is listed correctly; patient lives with her parents (father: Charly Carrillo [phone]). Patient's boyfriend/FOB Thuy Stanford [phone]) lives close by and plans to be involved in the daily care of the baby. Patient works and will have 6 weeks off from work, FOB is unsure if he will have any time away from work. Patient plans to breastfeed and will obtain a breast pump. A pediatrician has not yet been selected; a list of providers was given to the patient to begin exploring. Patient has car seat, crib, clothing, and other necessary supplies. Patient denied [facility] and Medicaid services, as she's attempted to apply for these services and was denied due to exceeding the eligibility requirements. There is a support system of family and friends to assist as needed, including patient's parents. Obs letter discussed and provided to patient.   Care Management Interventions  Transition of Care Consult (CM Consult): Discharge Planning  Current Support Network: Relative's Home (Lives with parents)  Confirm Follow Up Transport: Family  Plan discussed with Pt/Family/Caregiver: Yes  Discharge Location  Discharge Placement: [address] [place] [address]

## 2017-01-25 NOTE — LACTATION NOTE
Mom expresses an interest in breastfeeding. Gave her information from [employer] about signing up for classes. Discussed typical scenarios after birth with breastfeeding, term baby and early baby or NICU baby.

## 2017-01-25 NOTE — PROGRESS NOTES
Ante Partum Progress Note    Mellissa Mccoyalma  [id number]        Patient states she has no new complaints. Denies contractions or LOF. Baby moving    Vitals:  Visit Vitals    /59    Pulse 96    Temp 98.1 °F (36.7 °C)    Resp 16    Ht 5' 1\" (1.549 m)    Wt 98 lb 6.4 oz (44.6 kg)    LMP 2016    SpO2 100%    Breastfeeding No    BMI 18.59 kg/m2     Temp (24hrs), Av.1 °F (36.7 °C), Min:98.1 °F (36.7 °C), Max:98.1 °F (36.7 °C)      Last 24hr Input/Output:    Intake/Output Summary (Last 24 hours) at 17 1717  Last data filed at 17 1609   Gross per 24 hour   Intake             1200 ml   Output             3430 ml   Net            -2230 ml        Non stress test:  Non-reactive but appropriate for gestational age    Uterine Activity: Irregular, about 4/hour    Exam:  Patient without distress.      Abdomen, fundus soft non-tender     Extremities, no redness or tenderness               Additional Exam: Deferred    Labs:     Lab Results   Component Value Date/Time    WBC 12.5 2017 04:39 PM    WBC 12.0 2017 09:30 AM    WBC 12.7 2016 11:11 AM    HGB 11.8 2017 04:39 PM    HGB 12.5 2017 09:30 AM    HGB 13.0 2016 11:11 AM    HCT 35.2 2017 04:39 PM    HCT 36.8 2017 09:30 AM    HCT 39.3 2016 11:11 AM    PLATELET 074  04:39 PM    PLATELET 474 7389 09:30 AM    PLATELET 459  11:11 AM    Hgb, External 12.5 2017    Hgb, External 13.0 2016    Hct, External 36.8 2017    Hct, External 39.3 2016    Platelet cnt., External 327 2017    Platelet cnt., External 313 2016       Recent Results (from the past 24 hour(s))   CULTURE, GENITAL GROUP B STREP    Collection Time: 17  8:15 PM   Result Value Ref Range    Special Requests: NO SPECIAL REQUESTS      Culture result: NO GROUP B BETA STREPTOCOCCUS ISOLATED  SO FAR           Assessment: 29w4d   Premature cervical dilation, pos fFN last week    Plan: Continue hospitalization with hospitalized bedrest  Finish BMZ tonight. DC Magnesium.  Procardia tonight if adilene

## 2017-01-25 NOTE — PROGRESS NOTES
2379 - Assumed care of patient at this time. Patient sleeping.    0720 - Patient sleeping.    4139 - Patient sleeping. 0800 - verbal orders received from Dr. Wing Landeros to change magnesium sulfate to 1g/hr and continue to monitor. No [facility] consult today. 1288 - Dr. Wing Landeros paged    [address] [facility],[place] 101 received from Dr. Wing Landeros to d/c consult to perinatology and ultrasound at [facility].    3066 - Verbal orders received from Dr. Wing Landeros to stop magnesium after 2nd betamethasone injection given. To stop antibiotics at this time. Consult nutrition for underweight/low BMI patient. Anthony 68 Dietitian paged for consult. [address] called back for dietitian consult for low BMI during pregnancy related . Will round on patient and discuss diet to gain weight during pregnancy. 5178 - Pharmacy called regarding modification of betamethasone orders to q24hr x2 doses. Pharmacy will DC q12hr dosage. 1100 - FHR tracing reassuring for 29x4d, having 10x10 accelerations, but not 15x15.     1225 - Patient resting quietly, denies needs at this time. 1405 - Patient sitting up in bed talking on telephone, no needs at this time. 1500 - Dietitian at bedside evaluating patient. Jes 159 with Dr. Wing Landeros. Verifying orders placed to DC magnesium. TORB to DC samayoa. Will observe, can bolus if contractions increase. Dr. Wing Landeros will consider procardia this evening if she continues to contract. 1609 - Jo DAILEY. Magnesium stopped (see MAR). 8743 - Patient ambulated to bathroom with RN assistance. Instructed to call out for assistance toileting hereafter. 26 - Dr. Wing Landeros at bedside, rounding on patient. 1742 - Patient ambulated to bathroom. Reporting decrease in contractions. TOCO readjusted.

## 2017-01-25 NOTE — PROGRESS NOTES
1950 pt arrived to room 207 from triage. Bedside report received in SBAR format from SYLVIE Block, RN.   6985 pt given betamethasone injection, 4 GM loading dose of MgSo4 started, ampicillin 2 GM ivpb given, samayoa inserted, pt given opportunity to ask questions, pt voices understanding

## 2017-01-25 NOTE — PROGRESS NOTES
[address] care of pt at this time from Joey Maldonado RN.    5193 Bedside shift change report given to M. Denver Lares, RN (oncoming nurse) by SYLVIE Bates RN (offgoing nurse). Report included the following information SBAR, Kardex, Intake/Output, MAR and Recent Results.

## 2017-01-25 NOTE — H&P
History & Physical    Name: Theresa Live MRN: [id number]  SSN: [id number]    YOB: 1995  Age: 23 y.o. Sex: female        Subjective:     Estimated Date of Delivery: 17  OB History      Para Term  AB TAB SAB Ectopic Multiple Living    1                   Ms. La Nena Muniz is admitted with pregnancy at 29w3d for  labor. She report increasing abdominal pain since 2-3hrs ago. During triage observation, contractions every 2 mins. Denies any vaginal bleeding, leakage of fluid and reports +FM. She has been monitored today at office where FFN positive with cervical dilation noted at most 2 cm. Prenatal course was normal. Please see prenatal records for details. History reviewed. No pertinent past medical history. History reviewed. No pertinent past surgical history. Social History     Occupational History    Not on file. Social History Main Topics    Smoking status: Never Smoker    Smokeless tobacco: Never Used    Alcohol use No    Drug use: No    Sexual activity: Yes     Partners: Male     Birth control/ protection: None      Comment: Pregnancy     History reviewed. No pertinent family history. No Known Allergies  Prior to Admission medications    Medication Sig Start Date End Date Taking? Authorizing Provider   terconazole (TERAZOL 3) 0.8 % vaginal cream Insert 1 Applicator into vagina nightly. 17  Yes Jimmy Mcknight MD   prenatal multivit-ca-min-fe-fa tab Take  by mouth. Yes Historical Provider        A comprehensive review of systems was negative except for that written in the HPI.     Objective:     Vitals:  Vitals:    17 1408   BP: 107/59   Pulse: 82   Resp: 16   Temp: 98.6 °F (37 °C)   Weight: 44.6 kg (98 lb 6.4 oz)   Height: 5' 1\" (1.549 m)        Physical Exam  Gen: NAD  Pulm: CTA B/L  CV: S1S2 RRR  Abd: Gravid, soft, NT  Pelvic: No vaginal bleeding  Cervical Exam: 2cm dilated, 65%effaced, -3 fetal station,   Uterine Activity: Frequency: Every 2 minutes   Duration: 20 seconds  Intensity: moderate  Membranes: Intact  Fetal Heart Rate: Reactive     Prenatal Labs:   Lab Results   Component Value Date/Time    Rubella, External immune 2016    HBsAg, External neg 2016    HIV, External neg 2016    Gonorrhea, External neg 2016    Chlamydia, External neg 2016        Impression/Plan: 29.3weeks gestation,  labor, second trimester     Plan: Admit to antepartum. Begin PTL protocol. Magnesium sulfate tocolytics, Antibiotic prophylaxis with ampicillin; Vaginal-GBS Cx obtained; corticosteroid therapy- BMZ 12 mg  x 2 doses; MFM consult placed. CEFM  Patient counseled on purpose and goal of implementing PTL protocol.      Signed By:  Jazzmine Morocho MD     2017

## 2017-01-26 VITALS
OXYGEN SATURATION: 98 % | WEIGHT: 98.4 LBS | DIASTOLIC BLOOD PRESSURE: 43 MMHG | TEMPERATURE: 98.9 F | HEART RATE: 87 BPM | SYSTOLIC BLOOD PRESSURE: 102 MMHG | HEIGHT: 61 IN | BODY MASS INDEX: 18.58 KG/M2 | RESPIRATION RATE: 16 BRPM

## 2017-01-26 PROBLEM — O60.03 PRETERM LABOR IN THIRD TRIMESTER WITHOUT DELIVERY: Status: ACTIVE | Noted: 2017-01-24

## 2017-01-26 PROCEDURE — 74011250636 HC RX REV CODE- 250/636: Performed by: OBSTETRICS & GYNECOLOGY

## 2017-01-26 PROCEDURE — 76819 FETAL BIOPHYS PROFIL W/O NST: CPT | Performed by: OBSTETRICS & GYNECOLOGY

## 2017-01-26 PROCEDURE — 76805 OB US >/= 14 WKS SNGL FETUS: CPT | Performed by: OBSTETRICS & GYNECOLOGY

## 2017-01-26 RX ORDER — NIFEDIPINE 10 MG/1
10 CAPSULE ORAL
Qty: 90 CAP | Refills: 2 | Status: SHIPPED | OUTPATIENT
Start: 2017-01-26 | End: 2017-02-02

## 2017-01-26 RX ADMIN — SODIUM CHLORIDE, SODIUM LACTATE, POTASSIUM CHLORIDE, AND CALCIUM CHLORIDE 125 ML/HR: 600; 310; 30; 20 INJECTION, SOLUTION INTRAVENOUS at 08:06

## 2017-01-26 NOTE — DISCHARGE SUMMARY
Obstetrical Discharge Summary     Name: Lamar Helton MRN: [id number]  SSN: [id number]    YOB: 1995  Age: 23 y.o. Sex: female      Admit Date: 2017    Discharge Date: 2017     Admitting Physician: Susan Martínez MD     Attending Physician:  Rachel Curiel MD     Admission Diagnoses:  labor  Discharge Diagnoses: This patient has no babies on file. Additional Diagnoses:   Hospital Problems  Date Reviewed: 2017          Codes Class Noted POA     labor in third trimester without delivery ICD-10-CM: O60.03  ICD-9-CM: 644.03  2017 Yes        Encounter for supervision of normal first pregnancy ICD-10-CM: Z34.00  ICD-9-CM: V22.0  2016 Yes    Overview Addendum 2017  4:14 PM by Janell Ortega     EDC 2017 by    UA-- E. Coli at first visit  10/25/16 Flu vaccine given  FFN + @ 28wks                  Lab Results   Component Value Date/Time    Rubella, External immune 2016       Hospital Course: Pt admitted for observation, adilene, 2 cm, pos fFN 1 weeks ago. Contractions became painful. Treated with magnesium, received betamethasone. See by PASTOR  BPP. Magnesium stopped at 24 hours. Patient Instructions:   Current Discharge Medication List      START taking these medications    Details   NIFEdipine (PROCARDIA) 10 mg capsule Take 1 Cap by mouth every six (6) hours as needed. For contractions  Qty: 90 Cap, Refills: 2         CONTINUE these medications which have NOT CHANGED    Details   terconazole (TERAZOL 3) 0.8 % vaginal cream Insert 1 Applicator into vagina nightly. Qty: 20 g, Refills: 0      prenatal multivit-ca-min-fe-fa tab Take  by mouth. Reference my discharge instructions. Follow-up Appointments   Procedures    FOLLOW UP VISIT Appointment in: One Week     Standing Status:   Standing     Number of Occurrences:   1     Order Specific Question:   Appointment in     Answer:    One Week        Signed By:  Rachel Curiel MD     January 26, 2017

## 2017-01-26 NOTE — CONSULTS
M - Please see the Ultrasound report / consult note to be entered into this patient's record as a scanned document from my office. A text copy of this note is also provided below for convenience. Juliette Nice M.D., Ph.D.  Marleen Rad    -------------------------------------------------------    Indication:  Labor 2nd Tri O60.02; FHR decelerations. ____________________________________________________________________________  History: Age: 23 years. : 1 Para: 0.   Current Pregnancy: Blood group: O Rhesus D-positive. Pre- pregnancy data: Weight 87 lbs. Height 5 ft 1 ins. BMI 16.5.  ____________________________________________________________________________  Dating:  LMP: 16 EDC: 17 GA by LMP: 31w1d  Earlier Assessment on: 16 EDC: 17 GA by earlier assessment: 29w5d  Current Scan on: 17 EDC: 17 GA by current scan: 30w1d  Best Overall Assessment: 17 EDC: 17 Assessed GA: 29w5d  The calculation of the gestational age by current scan was based on BPD, OFD, HC, AC, FL and HUM. The Best Overall Assessment is based on an earlier assessment on 16 (GA 8w2d). ____________________________________________________________________________  General Evaluation:  Fetal heart activity: present. Fetal heart rate: 147 bpm.   Presentation: cephalic. Fetal movement: visible. Amniotic Fluid: normal. JACKIE  20.6 cm. Maximal vertical pocket 6.5 cm. Cord: 3 vessels. Fetal cord insertion site: Normal.   Placenta: anterior. ____________________________________________________________________________  Anatomy Scan:  German gestation. Biometry:  Fetal Measurements used for the estimation of the gestational age are bolded.   BPD 77.0 mm 74th% 30w6d (30w1d to 31w4d)  .4 mm 64th% 31w2d (28w3d to 34w2d)  .9 mm 46th% 29w5d (29w0d to 30w3d)  FL 54.1 mm 11th% 28w4d (26w4d to 30w5d)  .6 mm 76th% 30w6d  HUM 50.0 mm 39th% 29w0d  VENTRp 6.7 mm n/a  NASAL BONE 7.4 mm n/a  EFW (lbs/oz) 3 lbs 2 ozs  EFW (g) 1427 g  50th%       Fetal Anatomy:   Normal Abnormal Suboptimal Normal Abnormal Suboptimal  Head X o o Abdominal Wall X o o  Brain X o o GI- Tract o o o  Face X o o Kidneys X o o  Spine o o o Bladder X o o  Neck/Skin X o o Extremities X o o  Thorax X o o Skeleton X o o  Heart See Details o o Genitalia X  o o    Details: Brain: Visualized and normal appearance: brain parenchyma, cerebral ventricles, choroid plexus, Cisterna Magna, midline falx, cerebellum, cerebellar lobes, vermis. Not visible: cavum septi pellucidi. Posterior fossa: Limited. Face: eyes normal, profile normal, nose normal, lip normal, palate normal.  Spine: Normal Spine (Cervical, Thoracic, Lumbar and Sacral)  Thorax: Visualized and normal appearance: diaphragm. Heart: Visualized and normal appearance: cardiac location, four-chamber view, left outflow tract, right outflow tract, ventricles, great vessels, echogenicity. Cardiac axis: normal. Aortic arch: Normal.  Gastrointestinal Tract: Visualized with normal appearance: stomach. Gastrointestinal Tract: Normal stomach (location and size), liver, and bowel. Kidneys: Left kidney: normal. Right kidney: normal.  Genitalia: female fetus. Extremities:   Position of hands: normal. Position of feet: normal.    Summary of Ultrasound Findings:  Transabdominal US. U/S machine: Fugate.cl E8 Expert. Impression: The visible fetal anatomy appears normal, but some fetal anatomy could not be adequately evaluated at this time. ____________________________________________________________________________  Fetal Wellbeing Assessment:  Amniotic fluid: normal. JACKIE: 20.6 cm. MVP: 6.5 cm. Q1: 5.6 cm. Q2: 3.9 cm. Q3: 6.5 cm. Q4: 4.6 cm.    Biophysical Profile: Fetal body movements: normal (2), Fetal tone: normal (2), Fetal breathing movements: normal (2), Amniotic fluid volume: normal (2). Score 8 / 8. Summary: Reassuring fetal status. ____________________________________________________________________________  Maternal Structures:  Uterus: normal.  Cervix: not visible. Right Ovary: normal.   Right Ovary size: 14 mm x 17 mm x 18 mm. Volume: 2.2 ml. Left Ovary: normal.   Left Ovary size: 20 mm x 17 mm x 12 mm. Volume: 2.1 ml.  Cul de Sac / Pouch of Rigoberto: no free fluid visible.  ____________________________________________________________________________  Report Summary:  Impression: A maternal and routine fetal anatomy evaluation was performed. A biophysical profile is performed to evaluate fetal well-being. Ms. Padmini Connor is evaluated because of variable FHR decelerations during FHR monitoring in L&D this morning. She is currently admitted for evaluation and management of  labor, which is now arrested with a cervical dilation of about 2-cm. Her hospital course was significant for evaluation of contractions with a positive fetal fibronectin (2017), documented cervical change, magnesium sulfate, and  steroids. She has completed the steroid course and is no longer receiving tocolysis or magnesium sulfate. She is without contractions and has no obstetrical complaints at this time. She reports good fetal activity and denies bleeding, pain, and loss of fluid. Single viable IUP with biometry measurements consistent with 8-week ultrasound dating is observed. EFW is appropriate for gestational age. Maternal anatomy appears normal.  The cervix was not visible abdominally. I did not evaluate the cervix endovaginally as she has had recent digital cervical examinations that confirmed cervical dilation. The visible fetal anatomy appears normal as indicated above. Some fetal anatomy is poorly seen due fetal position. Normal fetal movements and amniotic fluid measurements are noted. Fetal assessment is reassuring.   See BPP score above (8 out of 8 points). Recommendations:   1. With no evidence of labor or advanced cervical dilation and normal fetal evaluation, amniotic fluid, and  testing (biophysical profile), continue with discharge planning. Consider nifedipine for maternal relief of contractions in the asymptomatic setting; Ms. Heide Malloy is advised that the nifedipine will not prevent or progression of  labor, and labor precautions are given to the patient. 2.  Consider follow-up MFM evaluation for  surveillance within the next 2-4 weeks, as clinically indicated.

## 2017-01-26 NOTE — PROGRESS NOTES
7:08 AM SBAR bedside report to [facility] RNC.  Care of patient released at this time

## 2017-01-26 NOTE — PROGRESS NOTES
1145 Patient care assumed at this time. Verbal and bedside report received from Andreia Ibarra RN.     1345 Bedside and Verbal shift change report given to Andreia Ibarra RN (oncoming nurse) by Charles Peterson RN (offgoing nurse). Report included the following information SBAR, Kardex, MAR, Accordion, Recent Results and Med Rec Status.

## 2017-01-26 NOTE — PROGRESS NOTES
1/26/2017 8:01 AM  In at patient's bedside to assist her to bathroom. Pt denies any pain or contractions at this time. 1/26/2017 0930  Pt up to BR with assistance. Returned and placed on EFM at [phone].     1/26/2017 9:39 AM  Patient to see [facility] at 21  today. 1/26/2017 10:28 AM  Patient to [facility] via wheelchair in stable condition. 1/26/2017 11:37 AM  Patient returned to room via wheelchair from [facility] in stable condition. Placed back on EFM, IV fluids restarted at 125 mL/hr per MD order. 1/26/2017 3:26 PM  Reviewed discharge instructions with patient. Patient understands follow up appointment and indications to take procardia. Patient has no further questions and left in stable condition.  Patient ambulatory per request.

## 2017-01-26 NOTE — PROGRESS NOTES
Ante Partum Progress Note    Dereck Comas  29w5d        Patient states she has no new complaints. Baby moving. Denies contractions.  BPP with MFM today    Vitals:  Visit Vitals    /43    Pulse 87    Temp 98.9 °F (37.2 °C)    Resp 16    Ht 5' 1\" (1.549 m)    Wt 98 lb 6.4 oz (44.6 kg)    LMP 2016    SpO2 98%    Breastfeeding No    BMI 18.59 kg/m2     Temp (24hrs), Av.6 °F (37 °C), Min:98.2 °F (36.8 °C), Max:98.9 °F (37.2 °C)      Last 24hr Input/Output:    Intake/Output Summary (Last 24 hours) at 17 1514  Last data filed at 17 1609   Gross per 24 hour   Intake                0 ml   Output              400 ml   Net             -400 ml        Non stress test:  Reactive    Uterine Activity: None     Exam:  Patient without distress. Abdomen, fundus soft non-tender     Extremities, no redness or tenderness               Additional Exam: deferred    Labs:     Lab Results   Component Value Date/Time    WBC 12.5 2017 04:39 PM    WBC 12.0 2017 09:30 AM    WBC 12.7 2016 11:11 AM    HGB 11.8 2017 04:39 PM    HGB 12.5 2017 09:30 AM    HGB 13.0 2016 11:11 AM    HCT 35.2 2017 04:39 PM    HCT 36.8 2017 09:30 AM    HCT 39.3 2016 11:11 AM    PLATELET 748  04:39 PM    PLATELET 063  09:30 AM    PLATELET 559  11:11 AM    Hgb, External 12.5 2017    Hgb, External 13.0 2016    Hct, External 36.8 2017    Hct, External 39.3 2016    Platelet cnt., External 327 2017    Platelet cnt., External 313 2016       No results found for this or any previous visit (from the past 24 hour(s)). Assessment: 29w5d   PTL - stable. Pos fFN,   S/p BMZ    Plan:  DC home. FU next week. Procardia 10mg take with contractions.  Given labor prec

## 2017-01-26 NOTE — DISCHARGE INSTRUCTIONS
Weeks 26 to 30 of Your Pregnancy: Care Instructions  Your Care Instructions    You are now in your last trimester of pregnancy. Your baby is growing rapidly. And you'll probably feel your baby moving around more often. Your doctor may ask you to count your baby's kicks. Your back may ache as your body gets used to your baby's size and length. If you haven't already had the Tdap shot during this pregnancy, talk to your doctor about getting it. It will help protect your  against pertussis infection. During this time, it's important to take care of yourself and pay attention to what your body needs. If you feel sexual, explore ways to be close with your partner that match your comfort and desire. Use the tips provided in this care sheet to find ways to be sexual in your own way. Follow-up care is a key part of your treatment and safety. Be sure to make and go to all appointments, and call your doctor if you are having problems. It's also a good idea to know your test results and keep a list of the medicines you take. How can you care for yourself at home? Take it easy at work  · Take frequent breaks. If possible, stop working when you are tired, and rest during your lunch hour. · Take bathroom breaks every 2 hours. · Change positions often. If you sit for long periods, stand up and walk around. · When you stand for a long time, keep one foot on a low stool with your knee bent. After standing a lot, sit with your feet up. · Avoid fumes, chemicals, and tobacco smoke. Be sexual in your own way  · Having sex during pregnancy is okay, unless your doctor tells you not to. · You may be very interested in sex, or you may have no interest at all. · Your growing belly can make it hard to find a good position during intercourse. [place] and explore. · You may get cramps in your uterus when your partner touches your breasts.   · A back rub may relieve the backache or cramps that sometimes follow orgasm. Learn about  labor  · Watch for signs of  labor. You may be going into labor if:  ¨ You have menstrual-like cramps, with or without nausea. ¨ You have about 4 or more contractions in 20 minutes, or about 8 or more within 1 hour, even after you have had a glass of water and are resting. ¨ You have a low, dull backache that does not go away when you change your position. ¨ You have pain or pressure in your pelvis that comes and goes in a pattern. ¨ You have intestinal cramping or flu-like symptoms, with or without diarrhea. ¨ You notice an increase or change in your vaginal discharge. Discharge may be heavy, mucus-like, watery, or streaked with blood. ¨ Your water breaks. · If you think you have  labor:  ¨ Drink 2 or 3 glasses of water or juice. Not drinking enough fluids can cause contractions. ¨ Stop what you are doing, and empty your bladder. Then lie down on your left side for at least 1 hour. ¨ While lying on your side, find your breast bone. Put your fingers in the soft spot just below it. Move your fingers down toward your belly button to find the top of your uterus. Check to see if it is tight. ¨ Contractions can be weak or strong. Record your contractions for an hour. Time a contraction from the start of one contraction to the start of the next one. ¨ Single or several strong contractions without a pattern are called Tripp-Murphy contractions. They are practice contractions but not the start of labor. They often stop if you change what you are doing. ¨ Call your doctor if you have regular contractions. Where can you learn more? Go to http://dorina-britt.info/. Enter U764 in the search box to learn more about \"Weeks 26 to 30 of Your Pregnancy: Care Instructions. \"  Current as of: May 30, 2016  Content Version: 11.1  © 3194-9955 [employer].  Care instructions adapted under license by [employer] (which disclaims liability or warranty for this information). If you have questions about a medical condition or this instruction, always ask your healthcare professional. [address] any warranty or liability for your use of this information. Counting Your Baby's Kicks: Care Instructions  Your Care Instructions  Counting your baby's kicks is one way your doctor can tell that your baby is healthy. Most women--especially in a first pregnancy--feel their baby move for the first time between 16 and 22 weeks. The movement may feel like flutters rather than kicks. Your baby may move more at certain times of the day. When you are active, you may notice less kicking than when you are resting. At your prenatal visits, your doctor will ask whether the baby is active. In your last trimester, your doctor may ask you to count the number of times you feel your baby move. Follow-up care is a key part of your treatment and safety. Be sure to make and go to all appointments, and call your doctor if you are having problems. It's also a good idea to know your test results and keep a list of the medicines you take. How do you count fetal kicks? · A common method of checking your baby's movement is to count the number of kicks or moves you feel in 1 hour. Ten movements (such as kicks, flutters, or rolls) in 1 hour are normal. Some doctors suggest that you count in the morning until you get to 10 movements. Then you can quit for that day and start again the next day. · Pick your baby's most active time of day to count. This may be any time from morning to evening. · If you do not feel 10 movements in an hour, your baby may be sleeping. Wait for the next hour and count again. When should you call for help?   Call your doctor now or seek immediate medical care if:  · You noticed that your baby has stopped moving or is moving much less than normal.  Watch closely for changes in your health, and be sure to contact your doctor if you have any problems. Where can you learn more? Go to http://dorina-britt.info/. Enter L731 in the search box to learn more about \"Counting Your Baby's Kicks: Care Instructions. \"  Current as of: May 30, 2016  Content Version: 11.1  © 2938-9173 [employer]. Care instructions adapted under license by [employer] (which disclaims liability or warranty for this information). If you have questions about a medical condition or this instruction, always ask your healthcare professional. [address] any warranty or liability for your use of this information. Herlene Mace Contractions: Care Instructions  Your Care Instructions  Tripp Murphy contractions prepare your uterus for labor. Think of them as a \"warm-up\" exercise that your body does. You may begin to feel them between the 28th and 30th weeks of your pregnancy. But they start as early as the 20th week. Edwards Murphy contractions usually occur more often during the ninth month. They may go away when you are active and return when you rest. These contractions are like mild contractions of true labor, but they occur less often. (You feel fewer than 8 in an hour.) They don't cause your cervix to open. It may be hard for you to tell the difference between Herlene Mace contractions and true labor, especially in your first pregnancy. Follow-up care is a key part of your treatment and safety. Be sure to make and go to all appointments, and call your doctor if you are having problems. It's also a good idea to know your test results and keep a list of the medicines you take. How can you care for yourself at home? · Try a warm bath to help relieve muscle tension and reduce pain. · Change positions every 30 minutes. Take breaks if you must sit for a long time. Get up and walk around. · Drink plenty of water, enough so that your urine is light yellow or clear like water.   · Taking short walks may help you feel better. Your doctor needs to check any contractions that are getting stronger or closer together. Where can you learn more? Go to http://dorina-britt.info/. Enter [id number] in the search box to learn more about \"Shannon Murphy Contractions: Care Instructions. \"  Current as of: May 30, 2016  Content Version: 11.1  © 4122-3145 [employer]. Care instructions adapted under license by [employer] (which disclaims liability or warranty for this information). If you have questions about a medical condition or this instruction, always ask your healthcare professional. [place] [address] any warranty or liability for your use of this information. .            Labor: Care Instructions  Your Care Instructions   labor is the start of labor between 21 and 36 weeks of pregnancy. A full-term pregnancy lasts 37 to 42 weeks. In labor, the uterus contracts to open the cervix. This is the first stage of childbirth.  labor can be caused by a problem with the baby, the mother, or both. Often the cause is not known. In some cases, doctors use medicines to try to delay labor until 29 or more weeks of pregnancy. By this time, a baby has grown enough so that problems are not likely. In some cases--such as with a serious infection--it is healthier for the baby to be born early. Your treatment will depend on how far along you are in your pregnancy and on your health and your baby's health. Follow-up care is a key part of your treatment and safety. Be sure to make and go to all appointments, and call your doctor if you are having problems. It's also a good idea to know your test results and keep a list of the medicines you take. How can you care for yourself at home? · If your doctor prescribed medicines, take them exactly as directed. Call your doctor if you think you are having a problem with your medicine.   · Rest until your doctor advises you about activity. He or she will tell you if you should stay in bed most of the time. You may need to arrange for  if you have young children. · Do not have sexual intercourse unless your doctor says it is safe. · Use pads, not tampons, if you have vaginal bleeding. · Make sure to drink plenty of fluids. Dehydration can lead to contractions. If you have kidney, heart, or liver disease and have to limit fluids, talk with your doctor before you increase the amount of fluids you drink. · Do not smoke or allow others to smoke around you. If you need help quitting, talk to your doctor about stop-smoking programs and medicines. These can increase your chances of quitting for good. When should you call for help? Call 911 anytime you think you may need emergency care. For example, call if:  · You passed out (lost consciousness). · You have severe vaginal bleeding. · You have severe pain in your belly or pelvis. · You have had fluid gushing or leaking from your vagina and you know or think the umbilical cord is bulging into your vagina. If this happens, immediately get down on your knees so your rear end (buttocks) is higher than your head. This will decrease the pressure on the cord until help arrives. Call your doctor now or seek immediate medical care if:  · You have signs of preeclampsia, such as:  ¨ Sudden swelling of your face, hands, or feet. ¨ New vision problems (such as dimness or blurring). ¨ A severe headache. · You have any vaginal bleeding. · You have belly pain or cramping. · You have a fever. · You have had regular contractions (with or without pain) for an hour. This means that you have 8 or more within 1 hour or 4 or more in 20 minutes after you change your position and drink fluids. · You have a sudden release of fluid from the vagina. · You have low back pain or pelvic pressure that does not go away.   · You notice that your baby has stopped moving or is moving much less than normal.  Watch closely for changes in your health, and be sure to contact your doctor if you have any problems. Where can you learn more? Go to http://dorina-britt.info/. Enter Q400 in the search box to learn more about \" Labor: Care Instructions. \"  Current as of: May 30, 2016  Content Version: 11.1  © 0452-9250 [employer]. Care instructions adapted under license by [employer] (which disclaims liability or warranty for this information). If you have questions about a medical condition or this instruction, always ask your healthcare professional. [place] [address] any warranty or liability for your use of this information. Follow up with Dr. Sandy Farrell on 2017 as scheduled.

## 2017-01-27 LAB
BACTERIA SPEC CULT: NORMAL
SERVICE CMNT-IMP: NORMAL

## 2017-01-30 ENCOUNTER — TELEPHONE (OUTPATIENT)
Dept: OBGYN CLINIC | Age: 22
End: 2017-01-30

## 2017-01-31 ENCOUNTER — HOSPITAL ENCOUNTER (INPATIENT)
Age: 22
LOS: 2 days | Discharge: HOME | End: 2017-02-02
Attending: OBSTETRICS & GYNECOLOGY | Admitting: OBSTETRICS & GYNECOLOGY
Payer: COMMERCIAL

## 2017-01-31 LAB
APPEARANCE UR: ABNORMAL
BACTERIA URNS QL MICRO: ABNORMAL /HPF
BILIRUB UR QL: NEGATIVE
COLOR UR: ABNORMAL
EPITH CASTS URNS QL MICRO: ABNORMAL /LPF
GLUCOSE UR STRIP.AUTO-MCNC: NEGATIVE MG/DL
HGB UR QL STRIP: ABNORMAL
HYALINE CASTS URNS QL MICRO: ABNORMAL /LPF (ref 0–5)
KETONES UR QL STRIP.AUTO: NEGATIVE MG/DL
LEUKOCYTE ESTERASE UR QL STRIP.AUTO: NEGATIVE
NITRITE UR QL STRIP.AUTO: NEGATIVE
PH UR STRIP: 7 [PH] (ref 5–8)
PROT UR STRIP-MCNC: NEGATIVE MG/DL
RBC #/AREA URNS HPF: ABNORMAL /HPF (ref 0–5)
SP GR UR REFRACTOMETRY: 1.01 (ref 1–1.03)
UA: UC IF INDICATED,UAUC: ABNORMAL
UROBILINOGEN UR QL STRIP.AUTO: 0.2 EU/DL (ref 0.2–1)
WBC URNS QL MICRO: ABNORMAL /HPF (ref 0–4)

## 2017-01-31 PROCEDURE — 88307 TISSUE EXAM BY PATHOLOGIST: CPT | Performed by: OBSTETRICS & GYNECOLOGY

## 2017-01-31 PROCEDURE — 4A0HXCZ MEASUREMENT OF PRODUCTS OF CONCEPTION, CARDIAC RATE, EXTERNAL APPROACH: ICD-10-PCS | Performed by: OBSTETRICS & GYNECOLOGY

## 2017-01-31 PROCEDURE — 77010026064 HC OXYGEN INFANT MED AIR MIN: Performed by: OBSTETRICS & GYNECOLOGY

## 2017-01-31 PROCEDURE — 75410000002 HC LABOR FEE PER 1 HR: Performed by: OBSTETRICS & GYNECOLOGY

## 2017-01-31 PROCEDURE — 77030021125

## 2017-01-31 PROCEDURE — 96375 TX/PRO/DX INJ NEW DRUG ADDON: CPT | Performed by: OBSTETRICS & GYNECOLOGY

## 2017-01-31 PROCEDURE — 65270000029 HC RM PRIVATE

## 2017-01-31 PROCEDURE — 87086 URINE CULTURE/COLONY COUNT: CPT | Performed by: OBSTETRICS & GYNECOLOGY

## 2017-01-31 PROCEDURE — 99282 EMERGENCY DEPT VISIT SF MDM: CPT | Performed by: OBSTETRICS & GYNECOLOGY

## 2017-01-31 PROCEDURE — 74011250636 HC RX REV CODE- 250/636: Performed by: OBSTETRICS & GYNECOLOGY

## 2017-01-31 PROCEDURE — 81001 URINALYSIS AUTO W/SCOPE: CPT | Performed by: OBSTETRICS & GYNECOLOGY

## 2017-01-31 PROCEDURE — 74011250637 HC RX REV CODE- 250/637: Performed by: OBSTETRICS & GYNECOLOGY

## 2017-01-31 PROCEDURE — 77030032490 HC SLV COMPR SCD KNE COVD -B

## 2017-01-31 PROCEDURE — 76815 OB US LIMITED FETUS(S): CPT | Performed by: OBSTETRICS & GYNECOLOGY

## 2017-01-31 PROCEDURE — 77010026065 HC OXYGEN MINIMUM MEDICAL AIR: Performed by: OBSTETRICS & GYNECOLOGY

## 2017-01-31 PROCEDURE — 10907ZC DRAINAGE OF AMNIOTIC FLUID, THERAPEUTIC FROM PRODUCTS OF CONCEPTION, VIA NATURAL OR ARTIFICIAL OPENING: ICD-10-PCS | Performed by: OBSTETRICS & GYNECOLOGY

## 2017-01-31 PROCEDURE — 75410000003 HC RECOV DEL/VAG/CSECN EA 0.5 HR: Performed by: OBSTETRICS & GYNECOLOGY

## 2017-01-31 PROCEDURE — 96365 THER/PROPH/DIAG IV INF INIT: CPT | Performed by: OBSTETRICS & GYNECOLOGY

## 2017-01-31 PROCEDURE — 77030018749 HC HK AMNIO DISP DERY -A

## 2017-01-31 PROCEDURE — 74011250636 HC RX REV CODE- 250/636

## 2017-01-31 PROCEDURE — 99218 HC RM OBSERVATION: CPT

## 2017-01-31 PROCEDURE — 96360 HYDRATION IV INFUSION INIT: CPT | Performed by: OBSTETRICS & GYNECOLOGY

## 2017-01-31 PROCEDURE — 75410000000 HC DELIVERY VAGINAL/SINGLE: Performed by: OBSTETRICS & GYNECOLOGY

## 2017-01-31 RX ORDER — BUTORPHANOL TARTRATE 1 MG/ML
1 INJECTION INTRAMUSCULAR; INTRAVENOUS
Status: DISCONTINUED | OUTPATIENT
Start: 2017-01-31 | End: 2017-02-02 | Stop reason: HOSPADM

## 2017-01-31 RX ORDER — SODIUM CHLORIDE, SODIUM LACTATE, POTASSIUM CHLORIDE, CALCIUM CHLORIDE 600; 310; 30; 20 MG/100ML; MG/100ML; MG/100ML; MG/100ML
125 INJECTION, SOLUTION INTRAVENOUS CONTINUOUS
Status: DISCONTINUED | OUTPATIENT
Start: 2017-01-31 | End: 2017-02-02 | Stop reason: HOSPADM

## 2017-01-31 RX ORDER — DIPHENHYDRAMINE HYDROCHLORIDE 50 MG/ML
12.5 INJECTION, SOLUTION INTRAMUSCULAR; INTRAVENOUS
Status: DISCONTINUED | OUTPATIENT
Start: 2017-01-31 | End: 2017-02-02 | Stop reason: HOSPADM

## 2017-01-31 RX ORDER — HYDROCODONE BITARTRATE AND ACETAMINOPHEN 5; 325 MG/1; MG/1
1 TABLET ORAL
Status: DISCONTINUED | OUTPATIENT
Start: 2017-01-31 | End: 2017-02-02 | Stop reason: HOSPADM

## 2017-01-31 RX ORDER — OXYTOCIN IN 5 % DEXTROSE 30/500 ML
500 PLASTIC BAG, INJECTION (ML) INTRAVENOUS ONCE
Status: COMPLETED | OUTPATIENT
Start: 2017-01-31 | End: 2017-01-31

## 2017-01-31 RX ORDER — IBUPROFEN 800 MG/1
800 TABLET ORAL EVERY 8 HOURS
Status: DISCONTINUED | OUTPATIENT
Start: 2017-01-31 | End: 2017-02-02 | Stop reason: HOSPADM

## 2017-01-31 RX ORDER — OXYTOCIN IN 5 % DEXTROSE 30/500 ML
PLASTIC BAG, INJECTION (ML) INTRAVENOUS
Status: COMPLETED
Start: 2017-01-31 | End: 2017-01-31

## 2017-01-31 RX ORDER — ONDANSETRON 2 MG/ML
4 INJECTION INTRAMUSCULAR; INTRAVENOUS
Status: DISCONTINUED | OUTPATIENT
Start: 2017-01-31 | End: 2017-02-02 | Stop reason: HOSPADM

## 2017-01-31 RX ORDER — BUTORPHANOL TARTRATE 1 MG/ML
1 INJECTION INTRAMUSCULAR; INTRAVENOUS ONCE
Status: COMPLETED | OUTPATIENT
Start: 2017-01-31 | End: 2017-01-31

## 2017-01-31 RX ORDER — SIMETHICONE 80 MG
80 TABLET,CHEWABLE ORAL
Status: DISCONTINUED | OUTPATIENT
Start: 2017-01-31 | End: 2017-02-02 | Stop reason: HOSPADM

## 2017-01-31 RX ORDER — HYDROCORTISONE ACETATE PRAMOXINE HCL 2.5; 1 G/100G; G/100G
CREAM TOPICAL AS NEEDED
Status: DISCONTINUED | OUTPATIENT
Start: 2017-01-31 | End: 2017-02-02 | Stop reason: HOSPADM

## 2017-01-31 RX ORDER — BUTORPHANOL TARTRATE 1 MG/ML
INJECTION INTRAMUSCULAR; INTRAVENOUS
Status: DISPENSED
Start: 2017-01-31 | End: 2017-02-01

## 2017-01-31 RX ORDER — SWAB
1 SWAB, NON-MEDICATED MISCELLANEOUS DAILY
Status: DISCONTINUED | OUTPATIENT
Start: 2017-02-01 | End: 2017-02-02 | Stop reason: HOSPADM

## 2017-01-31 RX ORDER — TERBUTALINE SULFATE 1 MG/ML
0.25 INJECTION SUBCUTANEOUS
Status: COMPLETED | OUTPATIENT
Start: 2017-01-31 | End: 2017-01-31

## 2017-01-31 RX ORDER — NALOXONE HYDROCHLORIDE 0.4 MG/ML
0.4 INJECTION, SOLUTION INTRAMUSCULAR; INTRAVENOUS; SUBCUTANEOUS AS NEEDED
Status: DISCONTINUED | OUTPATIENT
Start: 2017-01-31 | End: 2017-02-02 | Stop reason: HOSPADM

## 2017-01-31 RX ORDER — LIDOCAINE HYDROCHLORIDE 10 MG/ML
20 INJECTION INFILTRATION; PERINEURAL ONCE
Status: ACTIVE | OUTPATIENT
Start: 2017-01-31 | End: 2017-02-01

## 2017-01-31 RX ORDER — BUTORPHANOL TARTRATE 1 MG/ML
1 INJECTION INTRAMUSCULAR; INTRAVENOUS
Status: DISCONTINUED | OUTPATIENT
Start: 2017-01-31 | End: 2017-01-31

## 2017-01-31 RX ORDER — ADHESIVE BANDAGE
30 BANDAGE TOPICAL DAILY PRN
Status: DISCONTINUED | OUTPATIENT
Start: 2017-01-31 | End: 2017-02-02 | Stop reason: HOSPADM

## 2017-01-31 RX ORDER — NIFEDIPINE 10 MG/1
10 CAPSULE ORAL
Status: ACTIVE | OUTPATIENT
Start: 2017-01-31 | End: 2017-01-31

## 2017-01-31 RX ORDER — LIDOCAINE HYDROCHLORIDE 10 MG/ML
INJECTION INFILTRATION; PERINEURAL
Status: DISPENSED
Start: 2017-01-31 | End: 2017-02-01

## 2017-01-31 RX ORDER — OXYTOCIN/RINGER'S LACTATE 20/1000 ML
125-500 PLASTIC BAG, INJECTION (ML) INTRAVENOUS ONCE
Status: ACTIVE | OUTPATIENT
Start: 2017-01-31 | End: 2017-02-01

## 2017-01-31 RX ADMIN — SODIUM CHLORIDE, SODIUM LACTATE, POTASSIUM CHLORIDE, AND CALCIUM CHLORIDE 1000 ML/HR: 600; 310; 30; 20 INJECTION, SOLUTION INTRAVENOUS at 02:55

## 2017-01-31 RX ADMIN — BUTORPHANOL TARTRATE 1 MG: 1 INJECTION, SOLUTION INTRAMUSCULAR; INTRAVENOUS at 19:05

## 2017-01-31 RX ADMIN — SODIUM CHLORIDE, SODIUM LACTATE, POTASSIUM CHLORIDE, AND CALCIUM CHLORIDE 125 ML/HR: 600; 310; 30; 20 INJECTION, SOLUTION INTRAVENOUS at 15:00

## 2017-01-31 RX ADMIN — TERBUTALINE SULFATE 0.25 MG: 1 INJECTION SUBCUTANEOUS at 03:00

## 2017-01-31 RX ADMIN — BUTORPHANOL TARTRATE 1 MG: 1 INJECTION, SOLUTION INTRAMUSCULAR; INTRAVENOUS at 11:33

## 2017-01-31 RX ADMIN — Medication 30000 MILLI-UNITS: at 20:41

## 2017-01-31 RX ADMIN — IBUPROFEN 800 MG: 800 TABLET, FILM COATED ORAL at 21:59

## 2017-01-31 RX ADMIN — BUTORPHANOL TARTRATE 1 MG: 1 INJECTION, SOLUTION INTRAMUSCULAR; INTRAVENOUS at 15:59

## 2017-01-31 NOTE — PROGRESS NOTES
Ante Partum Progress Note    Simone Perrin  30w3d        Patient admitted this am with contractions. Received steroids last week. Tried to give Nifedipine but cannot swallow the pills. Received Stadol - only felt one contraction last hour. No ROM. Sees VB when she wipes    Vitals:  Visit Vitals    /60    Pulse 84    Temp 97.9 °F (36.6 °C)    Resp 16    Ht 5' 1\" (1.549 m)    Wt 98 lb (44.5 kg)    LMP 2016    SpO2 100%    BMI 18.52 kg/m2     Temp (24hrs), Av.4 °F (36.9 °C), Min:97.9 °F (36.6 °C), Max:98.8 °F (37.1 °C)      Last 24hr Input/Output:  No intake or output data in the 24 hours ending 17 1327     Non stress test:  Reactive, intermittent runs of variables with UC    Uterine Activity: Irregular     Exam:  Patient without distress. Abdomen, fundus soft non-tender     Extremities, no redness or tenderness           Cvx: 50/2-3/vtx/ballotable      Labs:     Lab Results   Component Value Date/Time    WBC 12.5 2017 04:39 PM    WBC 12.0 2017 09:30 AM    WBC 12.7 2016 11:11 AM    HGB 11.8 2017 04:39 PM    HGB 12.5 2017 09:30 AM    HGB 13.0 2016 11:11 AM    HCT 35.2 2017 04:39 PM    HCT 36.8 2017 09:30 AM    HCT 39.3 2016 11:11 AM    PLATELET 800  04:39 PM    PLATELET 790 6577 09:30 AM    PLATELET 369  11:11 AM    Hgb, External 12.5 2017    Hgb, External 13.0 2016    Hct, External 36.8 2017    Hct, External 39.3 2016    Platelet cnt., External 327 2017    Platelet cnt., External 313 2016       GBS negative last week  BPP 10/2017    Assessment: 30w3d   PTL  Intermittent variables (had last week as well precipitating the BPP) now resolved    Plan:  Received steroids last week. GBS neg, no indication for pCN  Disc with Dr. Neri Moore. No benefit to tocolysis once steroids received. Plan Mg for neuroprotection if appears actual delivery is expected. NICU consult. Disc with patient and family. Dr. Scarlett Hart to cover this afternoon, Dr. Ru Garcia overnight. I have personally spoken with both providers and they understand the plan of care.

## 2017-01-31 NOTE — PROGRESS NOTES
Patient evaluated secondary to concern from patient's nurse that the patient was complaining of contractions that were 10/10. Also informed that the patient had recently received stadol approximately 1 hour prior. The patient upon questioning states she is currently not feeling regular contractions but when she does have an contraction it is an 10/10. She has felt approximately 5 contractions in the patient hour, has vaginal spotting when wiping, denies leakage of vaginal fluid and had good fetal movement.     T 98.3 P 74 R 20 /51    FHR: 150 minimal variability, occasional mild variable decel, cat 2    [employer]: 5-6 contractions in the past hour    Abdomen: soft, gravid, non tender    Sve: deferred    Ext: no calf tenderness    Recent Results (from the past 24 hour(s))   URINALYSIS W/ REFLEX CULTURE    Collection Time: 17  2:54 PM   Result Value Ref Range    Color YELLOW/STRAW      Appearance TURBID (A) CLEAR      Specific gravity 1.011 1.003 - 1.030      pH (UA) 7.0 5.0 - 8.0      Protein NEGATIVE  NEG mg/dL    Glucose NEGATIVE  NEG mg/dL    Ketone NEGATIVE  NEG mg/dL    Bilirubin NEGATIVE  NEG      Blood TRACE (A) NEG      Urobilinogen 0.2 0.2 - 1.0 EU/dL    Nitrites NEGATIVE  NEG      Leukocyte Esterase NEGATIVE  NEG      WBC 0-4 0 - 4 /hpf    RBC 0-5 0 - 5 /hpf    Epithelial cells FEW FEW /lpf    Bacteria 1+ (A) NEG /hpf    UA:UC IF INDICATED URINE CULTURE ORDERED (A) CNI      Hyaline Cast 0-2 0 - 5 /lpf     Ass/Plan:  at 30 3/7 wks with  labor, cat 2 tracing with decreased variability secondary to stadol  -continue with LR at 125 ml/hr  -stadol 1 mg iv q 4 hours prn contraction pain  -patient informed that vaginal exams would be performed only if evidence of increase in contraction frequency and/or pain  -magnesium sulfate for neuro protection only if evidence of cervical change  -scds for dvt prophylaxis  -plan of care reviewed with patient and she did not have any questions

## 2017-01-31 NOTE — PROGRESS NOTES
Pt arrived to L&D with c/o contractions that started around 2200 this evening. Pt rates the contractions 10/10 on the pain scale. Pt was seen here last week for PTL. When reviewing pt's PTA meds pt states \"And she gave me those but I haven't taken them because I can't swallow them. \" referring to her Nifedipine prescription. 80 Dr. Aristeo Dodson notified of pt's arrival and current status. Order received for IV hydration and Terbutaline. 0330 Pt states she has not felt a contraction \"in a while. \"  0410 Pt states she has felt one contraction in the past 40 minutes. 5226 Dr. Aristeo Dodson updated on pt status. No new orders received at this time. 7263 Bedside shift change report given to GORAN Bobby (oncoming nurse) by SYLVIE Bates RN (offgoing nurse). Report included the following information SBAR, Kardex, Intake/Output, MAR and Recent Results.

## 2017-01-31 NOTE — PROGRESS NOTES
New orders received from Dr. Kyler Win. 9:22 AM  Dr. Kyler Win paged. 10:37 AM  Dr. Kyler Win paged. To notify no SVE done and pt unable to swallow procardia. Pharmacy called no other options. 11:27 AM  Dr. Kyler Win at bedside. MD reviewed fetal tracing. Per MD ok to give stadol at this time and pt may eat. 3:46 PM  Neonatologist at bedside. 5:10 PM  Dr. Madhu Lujan called and notified pt's c/o pain 10/10. Pt states pain was a 3-4/10 shortly after medication administration byt now 10/10. Informed pt does not appear to be 10/10 on pain scale. 5:33 PM  Dr. Julio César Dorsey at bedside. Pt now states she has no pain. 7:00 PM  TRANSFER - OUT REPORT:    Verbal report given to BERTIN Jameson on [facility]  for routine progression of care       Report consisted of patients Situation, Background, Assessment and   Recommendations(SBAR). Information from the following report(s) SBAR and MAR was reviewed with the receiving nurse. Lines:   Peripheral IV 01/31/17 Left Arm (Active)   Site Assessment Clean, dry, & intact 1/31/2017  3:03 AM   Phlebitis Assessment 0 1/31/2017  3:03 AM   Infiltration Assessment 0 1/31/2017  3:03 AM   Dressing Status Clean, dry, & intact 1/31/2017  3:03 AM   Dressing Type Tape;Transparent 1/31/2017  3:03 AM   Hub Color/Line Status Pink 1/31/2017  3:03 AM        Opportunity for questions and clarification was provided.

## 2017-01-31 NOTE — CONSULTS
Neonatology Antepartum Consultation    Name: Chantal Younger      Medical Record Number: [id number]      YOB: 1995     Today's Date: 2017                                                                 Date of Consultation:  2017  Time: 3:51 PM  Attending MD: India Dior  Referring Physician: Nikole iKng  Reason for Consultation:  PTL at 30 weeks    Subjective:     Age: 23 y.o.  Danilo Miller  Para:   Gestation age: 32w2d      Maternal steroids:  yes    Maternal and Pregnancy History: 22 yo G1 with EDC 17 for EGA of 30 3/7. Pregnancy complicated by PTL 1 week prior to admission. Betamethasone given ,  and then mother discharged. Mother readmitted  with contractions and cervix 2 cm. Mother currently receiving nifedipine. Plan is to monitor and likely discharge home if contractions decrease. Per discussion with Dr. Nikole King this mother is likely to deliver prematurely if not during this admission. Fetus is female \"Ctaherine\". Objective:     Medications:   Current Facility-Administered Medications   Medication Dose Route Frequency    lactated ringers infusion  125 mL/hr IntraVENous CONTINUOUS    NIFEdipine (PROCARDIA) capsule 10 mg  10 mg Oral NOW    butorphanol (STADOL) injection 1 mg  1 mg IntraVENous Q4H PRN     Rupture of Membrane: Membranes  Membrane Status: Intact   Meconium Stained:       Data Review:  Lab:   Lab Results   Component Value Date/Time    ABO/Rh(D) O POSITIVE 2017 04:39 PM    ABO,Rh O positive 2016      rublla immune, Hep B neg, HIV neg, GBS neg, T. Pall ab neg. Assessment:      Survival rate at this gestational age were reviewed.  The following problems related to admission NICU were discussed:   -longterm developmental outcome   -risk of RDS; possible need for intubation and surfactant therapy   -feeding plan - mother plans to provide breastmilk;    -risk of IVF   -need for possible umbilical lines and PIC lines   -apnea of prematurity   -temperature regulation   -apnea of prematurity   -anticipated length of stay    Mother  and father were present for discussion (father does not speak [place]). Please call neonatologist if family has further questions. Thank you for this consult. Time spent on consult: 30 (>50% of time spent on chart review, consultation and coordination of care.     Coco Anne MD  1/31/2017  3:51 PM

## 2017-01-31 NOTE — IP AVS SNAPSHOT
Summary of Care Report The Summary of Care report has been created to help improve care coordination. Users with access to [place] or [address] (Web-based application) may access additional patient information including the Discharge Summary. If you are not currently a [address] user and need more information, please call the number listed below in the [place] [address] section and ask to be connected with Medical Records. Facility Information Name Address Phone [address] [facility] [address] [phone] Patient Information Patient Name Sex TIFFANY Ortiz ([id number]) Female 1995 Discharge Information Admitting Provider Service Area Unit Jesus Downey MD / [phone] [address] [facility] 2 Labor & Delivery / [phone] Discharge Provider Discharge Date/Time Discharge Disposition Destination (none) 2017 (Pending) AHR (none) Patient Language Language ENGLISH [13] Problem List as of 2017  Date Reviewed: 2017 Codes Priority Class Noted - Resolved Encounter for supervision of normal first pregnancy ICD-10-CM: Z34.00 ICD-9-CM: V22.0   2016 - Present Overview Addendum 2017  4:14 PM by Janell Ortega EDC 2017 by [facility]-- E. Coli at first visit 10/25/16 Flu vaccine given FFN + @ 28wks  labor in third trimester without delivery ICD-10-CM: O60.03 
ICD-9-CM: 644.03   2017 - Present Pregnancy ICD-10-CM: Z33.1 ICD-9-CM: V22.2   2017 - Present You are allergic to the following No active allergies Current Discharge Medication List  
  
CONTINUE these medications which have NOT CHANGED Dose & Instructions Dispensing Information Comments  
 prenatal multivit-ca-min-fe-fa Tab Take  by mouth. Refills:  0 STOP taking these medications Comments NIFEdipine 10 mg capsule Commonly known as:  PROCARDIA  
   
   
 terconazole 0.8 % vaginal cream  
Commonly known as:  TERAZOL 3 Current Immunizations Name Date Influenza Vaccine (Quad) PF 10/25/2016 Tdap 2017 Follow-up Information Follow up With Details Comments Contact Info MD Carmen Worley [address] [facility] [address] 
[phone] Discharge Instructions Follow up with Dr. Ibrahima Garcia in 6 weeks. After Your Delivery (the Postpartum Period): Care Instructions Your Care Instructions Congratulations on the birth of your baby. Like pregnancy, the  period can be a time of excitement, alireza, and exhaustion. You may look at your wondrous little baby and feel happy. You may also be overwhelmed by your new sleep hours and new responsibilities. At first, babies often sleep during the days and are awake at night. They do not have a pattern or routine. They may make sudden gasps, jerk themselves awake, or look like they have crossed eyes. These are all normal, and they may even make you smile. In these first weeks after delivery, try to take good care of yourself. It may take 4 to 6 weeks to feel like yourself again, and possibly longer if you had a  birth. You will likely feel very tired for several weeks. Your days will be full of ups and downs, but lots of alireza as well. Follow-up care is a key part of your treatment and safety. Be sure to make and go to all appointments, and call your doctor if you are having problems. It's also a good idea to know your test results and keep a list of the medicines you take. How can you care for yourself at home? Take care of your body after delivery · Use pads instead of tampons for the bloody flow that may last as long as 2 weeks. · Ease cramps with ibuprofen (Advil, Motrin). · Ease soreness of hemorrhoids and the area between your vagina and rectum with ice compresses or witch hazel pads. · Ease constipation by drinking lots of fluid and eating high-fiber foods. Ask your doctor about over-the-counter stool softeners. · Cleanse yourself with a gentle squeeze of warm water from a bottle instead of wiping with toilet paper. · Take a sitz bath in warm water several times a day. · Wear a good nursing bra. Ease sore and swollen breasts with warm, wet washcloths. · If you are not breastfeeding, use ice rather than heat for breast soreness. · Your period may not start for several months if you are breastfeeding. You may bleed more, and longer at first, than you did before you got pregnant. · Wait until you are healed (about 4 to 6 weeks) before you have sexual intercourse. Your doctor will tell you when it is okay to have sex. · Try not to travel with your baby for 5 or 6 weeks. If you take a long car trip, make frequent stops to walk around and stretch. Avoid exhaustion · Rest every day. Try to nap when your baby naps. · Ask another adult to be with you for a few days after delivery. · Plan for  if you have other children. · Stay flexible so you can eat at odd hours and sleep when you need to. Both you and your baby are making new schedules. · Plan small trips to get out of the house. Change can make you feel less tired. · Ask for help with housework, cooking, and shopping. Remind yourself that your job is to care for your baby. Know about help for postpartum depression · \"Baby blues\" are common for the first 1 to 2 weeks after birth. You may cry or feel sad or irritable for no reason. · Rest whenever you can. Being tired makes it harder to handle your emotions. · Go for walks with your baby. · Talk to your partner, friends, and family about your feelings. · If your symptoms last for more than a few weeks, or if you feel very depressed, ask your doctor for help. · Postpartum depression can be treated. Support groups and counseling can help. Sometimes medicine can also help. Stay healthy · Eat healthy foods so you have more energy, make good breast milk, and lose extra baby pounds. · If you breastfeed, avoid alcohol and drugs. Stay smoke-free. If you quit during pregnancy, congratulations. · Start daily exercise after 4 to 6 weeks, but rest when you feel tired. · Learn exercises to tone your belly. Do Kegel exercises to regain strength in your pelvic muscles. You can do these exercises while you stand or sit. ¨ Squeeze the same muscles you would use to stop your urine. Your belly and thighs should not move. ¨ Hold the squeeze for 3 seconds, and then relax for 3 seconds. ¨ Start with 3 seconds. Then add 1 second each week until you are able to squeeze for 10 seconds. ¨ Repeat the exercise 10 to 15 times for each session. Do three or more sessions each day. · Find a class for new mothers and new babies that has an exercise time. · If you had a  birth, give yourself a bit more time before you exercise, and be careful. When should you call for help? Call 911 anytime you think you may need emergency care. For example, call if: 
· You passed out (lost consciousness). Call your doctor now or seek immediate medical care if: 
· You have severe vaginal bleeding. This means you are passing blood clots and soaking through a pad each hour for 2 or more hours. · You are dizzy or lightheaded, or you feel like you may faint. · You have a fever. · You have new belly pain, or your pain gets worse. Watch closely for changes in your health, and be sure to contact your doctor if: 
· Your vaginal bleeding seems to be getting heavier. · You have new or worse vaginal discharge. · You feel sad, anxious, or hopeless for more than a few days. · You do not get better as expected. Where can you learn more? Go to http://dev.info/. Enter A461 in the search box to learn more about \"After Your Delivery (the Postpartum Period): Care Instructions. \" Current as of: May 30, 2016 Content Version: 11.1 © 9995-7196 [employer], [employer]. Care instructions adapted under license by [employer] (which disclaims liability or warranty for this information). If you have questions about a medical condition or this instruction, always ask your healthcare professional. [place] [address] any warranty or liability for your use of this information. Chart Review Routing History Recipient Method Report Sent By Ingrid Burns MD  
Phone: [phone] In [facility] CUSTOM LAB/PATH REPORT Poppy All [[id number]] 10/31/2016 12:50 PM 10/25/2016 Jackie Burns MD  
Phone: [phone] In [place] [address] [[id number]] 1/17/2017  3:02 PM 1/17/2017 Jackie Burns MD  
Phone: [phone] In Basket IP Auto Routed Notes Zack Calero MD [[id number]] 1/24/2017  8:09 PM 01/24/2017 Jackie Burns MD  
Phone: [phone] In Basket IP Auto Routed Notes Olivia De La Cruz MD [[id number]] 1/31/2017  6:59 AM 01/31/2017

## 2017-01-31 NOTE — H&P
Pt is a 23 y. o.female. [id number]. The patient presents with complaints of uterine contractions  The patient denies LOF, vaginal bleeding, N/V/F/C. Pt reports good fetal movement. Pregnancy uncomplicated. History reviewed. No pertinent past medical history. Visit Vitals    /63    Pulse 73    Temp 98.8 °F (37.1 °C)    Resp 16    Ht 5' 1\" (1.549 m)    Wt 44.5 kg (98 lb)    BMI 18.52 kg/m2       Patient Vitals for the past 4 hrs:    Mode Fetal Heart Rate Variability Decelerations Accelerations RN Reviewed Strip?   17 0600 External 145 6-25 BPM None Yes Yes   17 0500 External 145 6-25 BPM None Yes Yes   17 0400 External 145 6-25 BPM None Yes Yes       Cervical Exam  Membrane Status: Intact    EXAM:  Cervical Exam:  2 cm dilated    Membranes:  Intact  Uterine Activity: Frequency: Every 5 minutes  Fetal Heart Rate: Reactive          ASSESSMENT:      IUP @ 30 weeks   contractions        PLAN:    Terbutaline  Observation  Discherge home when stable

## 2017-01-31 NOTE — PROGRESS NOTES
Strip reviewed - contractions every 5 minutes, irreg  Reactive with occ variable with UC     Cervix 2 cm 2 hours ago - unchanged from prior exam    Already received steroids last week, Pos fFN 28 weeks  Nl BPP last week       Nifedipine 10mg po now  Stadol   If variables continue, see MFM

## 2017-01-31 NOTE — IP AVS SNAPSHOT
[address] 
[phone] Patient: Kayy Pritchard MRN: [id number] QIE:7/48/9060 You are allergic to the following No active allergies Recent Documentation Height Weight BMI OB Status Smoking Status 1.549 m 44.5 kg 18.52 kg/m2 Pregnant Never Smoker Emergency Contacts Name Discharge Info Relation Home Work Mobile [address]  Parent [1] [phone] About your hospitalization You were admitted on:  January 31, 2017 You last received care in the:  [facility] 2 LABOR & DELIVERY You were discharged on:  February 2, 2017 Unit phone number:  [phone] Why you were hospitalized Your primary diagnosis was:  Not on File Your diagnoses also included:  Pregnancy Providers Seen During Your Hospitalizations Provider Role Specialty Primary office phone Keven Paul MD Attending Provider Obstetrics & Gynecology [phone] Your Primary Care Physician (PCP) Primary Care Physician Office Phone Office Fax [facility] [phone] Follow-up Information Follow up With Details Comments Contact Info Keven Paul MD   [address] [place] [address] 305 [address] 
[phone] Current Discharge Medication List  
  
CONTINUE these medications which have NOT CHANGED Dose & Instructions Dispensing Information Comments Morning Noon Evening Bedtime  
 prenatal multivit-ca-min-fe-fa Tab Your next dose is: Today, Tomorrow Other:  _________ Take  by mouth. Refills:  0 STOP taking these medications NIFEdipine 10 mg capsule Commonly known as:  PROCARDIA  
   
  
 terconazole 0.8 % vaginal cream  
Commonly known as:  TERAZOL 3 Discharge Instructions Follow up with Dr. Ibrahima Garcia in 6 weeks. After Your Delivery (the Postpartum Period): Care Instructions Your Care Instructions Congratulations on the birth of your baby. Like pregnancy, the  period can be a time of excitement, alireza, and exhaustion. You may look at your wondrous little baby and feel happy. You may also be overwhelmed by your new sleep hours and new responsibilities. At first, babies often sleep during the days and are awake at night. They do not have a pattern or routine. They may make sudden gasps, jerk themselves awake, or look like they have crossed eyes. These are all normal, and they may even make you smile. In these first weeks after delivery, try to take good care of yourself. It may take 4 to 6 weeks to feel like yourself again, and possibly longer if you had a  birth. You will likely feel very tired for several weeks. Your days will be full of ups and downs, but lots of alireza as well. Follow-up care is a key part of your treatment and safety. Be sure to make and go to all appointments, and call your doctor if you are having problems. It's also a good idea to know your test results and keep a list of the medicines you take. How can you care for yourself at home? Take care of your body after delivery · Use pads instead of tampons for the bloody flow that may last as long as 2 weeks. · Ease cramps with ibuprofen (Advil, Motrin). · Ease soreness of hemorrhoids and the area between your vagina and rectum with ice compresses or witch hazel pads. · Ease constipation by drinking lots of fluid and eating high-fiber foods. Ask your doctor about over-the-counter stool softeners. · Cleanse yourself with a gentle squeeze of warm water from a bottle instead of wiping with toilet paper. · Take a sitz bath in warm water several times a day. · Wear a good nursing bra. Ease sore and swollen breasts with warm, wet washcloths. · If you are not breastfeeding, use ice rather than heat for breast soreness. · Your period may not start for several months if you are breastfeeding. You may bleed more, and longer at first, than you did before you got pregnant. · Wait until you are healed (about 4 to 6 weeks) before you have sexual intercourse. Your doctor will tell you when it is okay to have sex. · Try not to travel with your baby for 5 or 6 weeks. If you take a long car trip, make frequent stops to walk around and stretch. Avoid exhaustion · Rest every day. Try to nap when your baby naps. · Ask another adult to be with you for a few days after delivery. · Plan for  if you have other children. · Stay flexible so you can eat at odd hours and sleep when you need to. Both you and your baby are making new schedules. · Plan small trips to get out of the house. Change can make you feel less tired. · Ask for help with housework, cooking, and shopping. Remind yourself that your job is to care for your baby. Know about help for postpartum depression · \"Baby blues\" are common for the first 1 to 2 weeks after birth. You may cry or feel sad or irritable for no reason. · Rest whenever you can. Being tired makes it harder to handle your emotions. · Go for walks with your baby. · Talk to your partner, friends, and family about your feelings. · If your symptoms last for more than a few weeks, or if you feel very depressed, ask your doctor for help. · Postpartum depression can be treated. Support groups and counseling can help. Sometimes medicine can also help. Stay healthy · Eat healthy foods so you have more energy, make good breast milk, and lose extra baby pounds. · If you breastfeed, avoid alcohol and drugs. Stay smoke-free. If you quit during pregnancy, congratulations. · Start daily exercise after 4 to 6 weeks, but rest when you feel tired. · Learn exercises to tone your belly. Do Kegel exercises to regain strength in your pelvic muscles. You can do these exercises while you stand or sit. ¨ Squeeze the same muscles you would use to stop your urine. Your belly and thighs should not move. ¨ Hold the squeeze for 3 seconds, and then relax for 3 seconds. ¨ Start with 3 seconds. Then add 1 second each week until you are able to squeeze for 10 seconds. ¨ Repeat the exercise 10 to 15 times for each session. Do three or more sessions each day. · Find a class for new mothers and new babies that has an exercise time. · If you had a  birth, give yourself a bit more time before you exercise, and be careful. When should you call for help? Call 911 anytime you think you may need emergency care. For example, call if: 
· You passed out (lost consciousness). Call your doctor now or seek immediate medical care if: 
· You have severe vaginal bleeding. This means you are passing blood clots and soaking through a pad each hour for 2 or more hours. · You are dizzy or lightheaded, or you feel like you may faint. · You have a fever. · You have new belly pain, or your pain gets worse. Watch closely for changes in your health, and be sure to contact your doctor if: 
· Your vaginal bleeding seems to be getting heavier. · You have new or worse vaginal discharge. · You feel sad, anxious, or hopeless for more than a few days. · You do not get better as expected. Where can you learn more? Go to http://dorina-britt.info/. Enter A461 in the search box to learn more about \"After Your Delivery (the Postpartum Period): Care Instructions. \" Current as of: May 30, 2016 Content Version: 11.1 © 7681-7665 [employer]. Care instructions adapted under license by [employer] (which disclaims liability or warranty for this information). If you have questions about a medical condition or this instruction, always ask your healthcare professional. [address] any warranty or liability for your use of this information. Discharge Orders None [employer] Announcement We are excited to announce that we are making your provider's discharge notes available to you in [employer]. You will see these notes when they are completed and signed by the physician that discharged you from your recent hospital stay. If you have any questions or concerns about any information you see in [employer], please call the Health Information Department where you were seen or reach out to your Primary Care Provider for more information about your plan of care. Introducing [facility]! Dear Maritza Reed: Thank you for requesting a [employer] account. Our records indicate that you already have an active [employer] account. You can access your account anytime at [email or web address] Did you know that you can access your hospital and ER discharge instructions at any time in [employer]? You can also review all of your test results from your hospital stay or ER visit. Additional Information If you have questions, please visit the Frequently Asked Questions section of the [employer] website at [email or web address] Remember, [employer] is NOT to be used for urgent needs. For medical emergencies, dial 911. Now available from your iPhone and Android! General Information Please provide this summary of care documentation to your next provider. Patient Signature:  ____________________________________________________________ Date:  ____________________________________________________________  
  
[facility] Provider Signature:  ____________________________________________________________ Date:  ____________________________________________________________

## 2017-02-01 PROBLEM — Z34.90 PREGNANCY: Status: ACTIVE | Noted: 2017-02-01

## 2017-02-01 PROCEDURE — 65270000029 HC RM PRIVATE

## 2017-02-01 PROCEDURE — 74011250637 HC RX REV CODE- 250/637: Performed by: OBSTETRICS & GYNECOLOGY

## 2017-02-01 RX ADMIN — IBUPROFEN 800 MG: 800 TABLET, FILM COATED ORAL at 12:25

## 2017-02-01 RX ADMIN — IBUPROFEN 800 MG: 800 TABLET, FILM COATED ORAL at 21:21

## 2017-02-01 RX ADMIN — Medication 1 TABLET: at 12:28

## 2017-02-01 NOTE — L&D DELIVERY NOTE
Delivery Summary    Patient: Codey Santillan MRN: [id number]  SSN: [id number]    YOB: 1995  Age: 23 y.o. Sex: female        Labor Events:    Labor: Yes    Rupture Date: 2017    Rupture Time: 8:32 PM    Rupture Type AROM    Amniotic Fluid Volume:       Amniotic Fluid Description: Clear       Induction: None         Augmentation: None    Labor Complications: Additional Complications:        Cervical Ripening:       None      Delivery Events:  Episiotomy:      Laceration(s):         Repaired:       Number of Repair Packets:      Suture Type and Size:          Estimated Blood Loss (ml):          Information for the patient's newbornJessica Lee, Female [[id number]]     Delivery Summary - Baby    Delivery Date: 2017   Delivery Time: 8:37 PM   Delivery Type:    Sex:  female  Gestational Age: 30w3d  Delivery Clinician:  Asia Carrington?:     Delivery Location:               [facility]  One minute Five minutes Ten minutes   Skin Color:            Heart Rate:             Reflex Irritability:             Muscle Tone:           Respiration:             Total:               Presentation: Vertex  Position:        Resuscitation Method:        Meconium Stained:      Cord Information: 3 Vessels   Complications: Nuchal Cord With Compressions  Cord Blood Sent?:  Yes    Blood Gases Sent?:  Yes    Placenta:  Date/Time:   8:39 PM  Removal: Spontaneous      Appearance: Intact; Normal     [place] Measurements:  Birth Weight:      Birth Length:     Head Circumference:       Chest Circumference:      Abdominal Girth:       Other Providers:   Ray BAUMANN;MIKAEL NAVARRO;STEVE SCHAEFER;;KARLENE CÁRDENAS;Kaylyn HARRIS MANDY L Obstetrician;Primary Nurse;Nicu Nurse;Neonatologist;Nurse Practitioner;Charge Nurse;Staff Nurse           Cord Blood Results:  Information for the patient's newbornJessica Lee, Female [[id number]]   No results found for: ABORH, PCTABR, PCTDIG, BILI, ABORHEXT, [address]    Information for the patient's newbornElvebozena Lee, Female [[id number]]   No results found for: APH, APCO2, APO2, AHCO3, ABEC, ABDC, O2ST, [place], [place], PHI, [place], PO2I, HCO3I, SO2I, IBD     Information for the patient's newbornJessica Lee, Female [[id number]]   No results found for: EPHV, PCO2V, PO2V, HCO3V, O2STV, EBDV    Additional Comments: The patient complained of onset of strong uterine contractions and rectal pressure. Performed sve and the patient was noted to be 10/100/+2 bbow. Bedside sono- vertex presentation. NICU was notitifed. Once the NICU team was in the room the patient was placed in lithotomy position. Amniotomy was performed with clear amniotic fluid noted. The patient pushed and delivered the infant's head and anterior shoulder. A gush of blood was noted upon delivery of the infant's head. There was a tight nuchal cord that was clamped and cut. The patient gave another light push and delivered the the infant's torso. The baby was handed to the waiting NICU team.  Cord blood and an arterial cord blood gas was collected. The placenta delivered spontaneously and intact and the placental bed appeared grossly normal.  The patient's perineum was intact.  ebl 200 ml. The placenta was sent to pathology.

## 2017-02-01 NOTE — PROGRESS NOTES
2018 Pt states she is feeling increased pain after voiding, Dr. Jean Hitchcock notified, state she will come to bedside. 2020 Dr. Jean Hitchcock at bedside, sve 10 cm, bulging bag, RN called for nicu and table to be set. Bedside sono done, vertex confirmed. 2037 SVE with Dr. Jean Hitchcock and NICU attending.

## 2017-02-01 NOTE — PROGRESS NOTES
The patient complains of strong contractions and rectal pressure.     FHR: 145 moderate variability, variable decels, cat 2    [place]: contractions q 2-4 minutes    Sve: 10/100/+2 bbow    Bedside sono: vertex    Ass/Plan:  at 30+ wks completely dialted  -prepare for   -nicu notified and made aware that the patient received stadol approximately 1 hour prior

## 2017-02-01 NOTE — PROGRESS NOTES
Bedside and Verbal shift change report given to Marcos Somers RN (oncoming nurse) by Seun Castillo RNC (offgoing nurse). Report included the following information SBAR, Kardex, Intake/Output and MAR.

## 2017-02-01 NOTE — PROGRESS NOTES
1556 - Assumed care of patient at this time. DC tdap, rhogam and MMR per protocol as entered in error. Pt currently at infant bedside in NICU.    1615 - Patient ambulating back to room from NICU. Denies any needs at this time. Instructed to call out for assistance. 1905 - Bedside and Verbal shift change report given to Cassandra Presley RN (oncoming nurse) by Maritza Goldberg. Prabhakar Malave RN (offgoing nurse). Report included the following information SBAR, Kardex, Procedure Summary, Intake/Output, MAR, Recent Results and Med Rec Status.

## 2017-02-01 NOTE — PROGRESS NOTES
Bedside and Verbal shift change report given to Meliton oWody RN (oncoming nurse) by Rosa Richard RN (offgoing nurse). Report given with SBAR, Kardex, Intake/Output, MAR and Recent Results.

## 2017-02-01 NOTE — PROGRESS NOTES
Post-Partum Day Number 1 Progress Note    Joanne Fernandez       Information for the patient's newbornBofelicita Elena, Female [[id number]]   Vaginal, Spontaneous Delivery   Patient doing well without significant complaint. Voiding without difficulty, normal lochia. Pt is pumping, baby in NICU    Vitals:  Visit Vitals    /50 (BP 1 Location: Right arm, BP Patient Position: Sitting)    Pulse 88    Temp 98.8 °F (37.1 °C)    Resp 16    Ht 5' 1\" (1.549 m)    Wt 98 lb (44.5 kg)    LMP 2016    SpO2 99%    BMI 18.52 kg/m2     Temp (24hrs), Av.4 °F (36.9 °C), Min:98.2 °F (36.8 °C), Max:98.8 °F (37.1 °C)        Exam:   Patient without distress. Abdomen soft, fundus firm, nontender                Perineum with normal lochia noted. Lower extremities are negative for swelling, cords or tenderness.     Labs:     Lab Results   Component Value Date/Time    WBC 12.5 2017 04:39 PM    WBC 12.0 2017 09:30 AM    WBC 12.7 2016 11:11 AM    HGB 11.8 2017 04:39 PM    HGB 12.5 2017 09:30 AM    HGB 13.0 2016 11:11 AM    HCT 35.2 2017 04:39 PM    HCT 36.8 2017 09:30 AM    HCT 39.3 2016 11:11 AM    PLATELET 699  04:39 PM    PLATELET 921  09:30 AM    PLATELET 575  11:11 AM    Hgb, External 12.5 2017    Hgb, External 13.0 2016    Hct, External 36.8 2017    Hct, External 39.3 2016    Platelet cnt., External 327 2017    Platelet cnt., External 313 2016       Recent Results (from the past 24 hour(s))   URINALYSIS W/ REFLEX CULTURE    Collection Time: 17  2:54 PM   Result Value Ref Range    Color YELLOW/STRAW      Appearance TURBID (A) CLEAR      Specific gravity 1.011 1.003 - 1.030      pH (UA) 7.0 5.0 - 8.0      Protein NEGATIVE  NEG mg/dL    Glucose NEGATIVE  NEG mg/dL    Ketone NEGATIVE  NEG mg/dL    Bilirubin NEGATIVE  NEG      Blood TRACE (A) NEG      Urobilinogen 0.2 0.2 - 1.0 EU/dL    Nitrites NEGATIVE  NEG      Leukocyte Esterase NEGATIVE  NEG      WBC 0-4 0 - 4 /hpf    RBC 0-5 0 - 5 /hpf    Epithelial cells FEW FEW /lpf    Bacteria 1+ (A) NEG /hpf    UA:UC IF INDICATED URINE CULTURE ORDERED (A) CNI      Hyaline cast 0-2 0 - 5 /lpf       Assessment: Doing well, post partum day 1    Plan:  1. Continue routine postpartum and perineal care as well as maternal education.   Cont pumping

## 2017-02-01 NOTE — LACTATION NOTE
Infant admitted to NICU. Pt will successfully establish breast milk supply by pumping with a hospital grade pump every 2-3 hours for approximately 20 minutes/8-10 x day. To maximize milk production mom taught to incorporate breast massage before and hand expression after each pumping session. All expressed breast milk (EBM) will be provided for infant(s) use. The value of skin to skin bonding emphasized. The breast will be offered as baby is ready; with the goal of eventual transition to breastfeeding. Importance of maintaining milk supply through pumping emphasized as infant(s) learns to nurse. Pt will successfully establish breastfeeding by feeding in response to early feeding cues   or wake every 3h, will obtain deep latch, and will keep log of feedings/output. Taught to BF at hunger cues and or q 2-3 hrs and to offer 10-20 drops of hand expressed colostrum at any non-feeds. Breast Assessment  Left Breast: Small  (Round well formed breasts)  Left Nipple: Everted, Intact  Right Breast: Small   Right Nipple: Everted, Intact  Breast- Feeding Assessment  Attends Breast-Feeding Classes: No (30.4 wk NICU admission; Pumping regimen reviewed, [facility] hand expression video on her phone for viewing)  Breast-Feeding Experience: No  Breast Trauma/Surgery: No  Type/Quality:  (shared how to establish milk supply through pumping, taught to include breast massage before + hand expression after pumping)  Lactation Consultant Visits  Breast-Feedings: Not breast-feeding (Pumping to establish milk supply)  Mother/Infant Observation  Mother Observation:  (Discussed need for electric breast pump for use at home, encouraged to call insurer + view options on [email or web address])  LATCH Documentation  Type of Nipple: Everted (after stimulation)  Hand Expression Education:  Mom taught how to manually hand express her colostrum.   Emphasized the importance of providing infant with valuable colostrum as infant rests skin to skin at breast.  Aware to avoid extended periods of non-feeding. Aware to offer 10-20+ drops of colostrum every 2-3 hours until infant is latching and nursing effectively. Taught the rationale behind this low tech but highly effective evidence based practice. Guidelines for pumping, milk collection and storage, proper cleaning of pump parts all reviewed. How to establish and maintain breast milk supply through pumping reviewed. Differences between hospital grade rental pumps vs store bought double electric/hand pumps discussed. Set up pumping with double electric set up. Assisted with pump session. List of area pump rental locations and lactation support services provided.

## 2017-02-02 ENCOUNTER — TELEPHONE (OUTPATIENT)
Dept: OBGYN CLINIC | Age: 22
End: 2017-02-02

## 2017-02-02 VITALS
DIASTOLIC BLOOD PRESSURE: 60 MMHG | HEIGHT: 61 IN | BODY MASS INDEX: 18.5 KG/M2 | HEART RATE: 77 BPM | OXYGEN SATURATION: 99 % | WEIGHT: 98 LBS | SYSTOLIC BLOOD PRESSURE: 119 MMHG | TEMPERATURE: 98.2 F | RESPIRATION RATE: 16 BRPM

## 2017-02-02 LAB
BACTERIA SPEC CULT: NORMAL
CC UR VC: NORMAL
SERVICE CMNT-IMP: NORMAL

## 2017-02-02 PROCEDURE — 74011250637 HC RX REV CODE- 250/637: Performed by: OBSTETRICS & GYNECOLOGY

## 2017-02-02 RX ADMIN — IBUPROFEN 800 MG: 800 TABLET, FILM COATED ORAL at 08:08

## 2017-02-02 RX ADMIN — Medication 1 TABLET: at 08:08

## 2017-02-02 NOTE — LACTATION NOTE
Infant admitted to NICU. Pt will successfully establish breast milk supply by pumping with a hospital grade pump every 2-3 hours for approximately 20 minutes/8-10 x day. To maximize milk production mom taught to incorporate breast massage before and hand expression after each pumping session. All expressed breast milk (EBM) will be provided for infant(s) use. The value of skin to skin bonding emphasized. The breast will be offered as baby is ready; with the goal of eventual transition to breastfeeding. Importance of maintaining milk supply through pumping emphasized as infant(s) learns to nurse. Pt will successfully establish breastfeeding by feeding in response to early feeding cues   or wake every 3h, will obtain deep latch, and will keep log of feedings/output. Taught to BF at hunger cues and or q 2-3 hrs and to offer 10-20 drops of hand expressed colostrum at any non-feeds. Breast Assessment  Left Breast: Small  (Mom states\"'my breasts feel samayoa today\")  Left Nipple: Everted, Intact  Right Breast: Small   Right Nipple: Everted, Intact (Anticipatory guidance for engorgement discussed)  Breast- Feeding Assessment  Attends Breast-Feeding Classes: No (30.5 wk NICU adm; [employer] pump rented for use at home.)  Breast-Feeding Experience: No  Breast Trauma/Surgery: No  Type/Quality:  (Pumping)  Lactation Consultant Visits  Breast-Feedings: Not breast-feeding (How to establish milk supply through pumping reviewed)  Mother/Infant Observation  Mother Observation:  (Individualized written Pumping/BF Plan of Care reviewed)  Infant Observation:  (Pt viewed [facility] hand expression video)  LATCH Documentation  Latch:  (Not at breast at this time)  Type of Nipple: Everted (after stimulation)  Hold (Positioning):  ([employer] pump rented for use at home)   Plans for home discussed, anticipatory guidance shared, packet with individualized pumping/feeding plan reviewed.  Milk collection and storage info sheet provided with discussion on how to bring in her milk to the NICU. Hand Expression Education:  Mom taught how to manually hand express her colostrum. Emphasized the importance of providing infant with valuable colostrum as infant rests skin to skin at breast.  Aware to avoid extended periods of non-feeding. Aware to offer 10-20+ drops of colostrum every 2-3 hours until infant is latching and nursing effectively. Taught the rationale behind this low tech but highly effective evidence based practice. Guidelines for pumping, milk collection and storage, proper cleaning of pump parts all reviewed. How to establish and maintain breast milk supply through pumping reviewed. Differences between hospital grade rental pumps vs store bought double electric/hand pumps discussed. Set up pumping with double electric set up. Assisted with pump session. List of area pump rental locations and lactation support services provided. Engorgement Care Guidelines:  Reviewed how milk is made and normal phases of milk production. Taught care of engorged breasts - frequent breastfeeding encouraged, cool packs and motrin as tolerated. Anticipatory guidance shared.

## 2017-02-02 NOTE — PROGRESS NOTES
Report received from GORAN Mcmahon RN  8818- Pt offered a Orval Natasha for her pain, pt states \"not that bad, will wait until 8:30 for motrin\" denies any needs at this time    0700- Report given to JAYANT Villarreal RN

## 2017-02-02 NOTE — PROGRESS NOTES
1850 verbal and bedside report received from Juan Wild, BERTIN. Patient care assumed at this time. 4602 Patient given discharge packet and discussed at length the material inside the packet. RN answered all patient questions and concerns. Patient verbalized all understanding of discharge material.     1152 Dr. Wing Landeros in to see patient at the bedside. Discharge orders received from MD.     1200 Patient given all discharge instructions. Verbalized understanding and signed paper copy of instructions has been place on chart. Contacted [place], [facility] to ensure patient has been properly set up with breast pump. LC will be by shortly to see patient. 1315 Patient has rental pump process complete and can now be discharged. SO carrying stuff to car and RN accompanying patient to car.     [phone] Patient ambulated off unit with RN. No assistance needed. Patient remained free of distress. Patient discharged home.

## 2017-02-02 NOTE — DISCHARGE INSTRUCTIONS
Follow up with Dr. Garima Buchanan in 6 weeks. After Your Delivery (the Postpartum Period): Care Instructions  Your Care Instructions  Congratulations on the birth of your baby. Like pregnancy, the  period can be a time of excitement, alireza, and exhaustion. You may look at your wondrous little baby and feel happy. You may also be overwhelmed by your new sleep hours and new responsibilities. At first, babies often sleep during the days and are awake at night. They do not have a pattern or routine. They may make sudden gasps, jerk themselves awake, or look like they have crossed eyes. These are all normal, and they may even make you smile. In these first weeks after delivery, try to take good care of yourself. It may take 4 to 6 weeks to feel like yourself again, and possibly longer if you had a  birth. You will likely feel very tired for several weeks. Your days will be full of ups and downs, but lots of alireza as well. Follow-up care is a key part of your treatment and safety. Be sure to make and go to all appointments, and call your doctor if you are having problems. It's also a good idea to know your test results and keep a list of the medicines you take. How can you care for yourself at home? Take care of your body after delivery  · Use pads instead of tampons for the bloody flow that may last as long as 2 weeks. · Ease cramps with ibuprofen (Advil, Motrin). · Ease soreness of hemorrhoids and the area between your vagina and rectum with ice compresses or witch hazel pads. · Ease constipation by drinking lots of fluid and eating high-fiber foods. Ask your doctor about over-the-counter stool softeners. · Cleanse yourself with a gentle squeeze of warm water from a bottle instead of wiping with toilet paper. · Take a sitz bath in warm water several times a day. · Wear a good nursing bra. Ease sore and swollen breasts with warm, wet washcloths.   · If you are not breastfeeding, use ice rather than heat for breast soreness. · Your period may not start for several months if you are breastfeeding. You may bleed more, and longer at first, than you did before you got pregnant. · Wait until you are healed (about 4 to 6 weeks) before you have sexual intercourse. Your doctor will tell you when it is okay to have sex. · Try not to travel with your baby for 5 or 6 weeks. If you take a long car trip, make frequent stops to walk around and stretch. Avoid exhaustion  · Rest every day. Try to nap when your baby naps. · Ask another adult to be with you for a few days after delivery. · Plan for  if you have other children. · Stay flexible so you can eat at odd hours and sleep when you need to. Both you and your baby are making new schedules. · Plan small trips to get out of the house. Change can make you feel less tired. · Ask for help with housework, cooking, and shopping. Remind yourself that your job is to care for your baby. Know about help for postpartum depression  · \"Baby blues\" are common for the first 1 to 2 weeks after birth. You may cry or feel sad or irritable for no reason. · Rest whenever you can. Being tired makes it harder to handle your emotions. · Go for walks with your baby. · Talk to your partner, friends, and family about your feelings. · If your symptoms last for more than a few weeks, or if you feel very depressed, ask your doctor for help. · Postpartum depression can be treated. Support groups and counseling can help. Sometimes medicine can also help. Stay healthy  · Eat healthy foods so you have more energy, make good breast milk, and lose extra baby pounds. · If you breastfeed, avoid alcohol and drugs. Stay smoke-free. If you quit during pregnancy, congratulations. · Start daily exercise after 4 to 6 weeks, but rest when you feel tired. · Learn exercises to tone your belly. Do Kegel exercises to regain strength in your pelvic muscles.  You can do these exercises while you stand or sit. ¨ Squeeze the same muscles you would use to stop your urine. Your belly and thighs should not move. ¨ Hold the squeeze for 3 seconds, and then relax for 3 seconds. ¨ Start with 3 seconds. Then add 1 second each week until you are able to squeeze for 10 seconds. ¨ Repeat the exercise 10 to 15 times for each session. Do three or more sessions each day. · Find a class for new mothers and new babies that has an exercise time. · If you had a  birth, give yourself a bit more time before you exercise, and be careful. When should you call for help? Call 911 anytime you think you may need emergency care. For example, call if:  · You passed out (lost consciousness). Call your doctor now or seek immediate medical care if:  · You have severe vaginal bleeding. This means you are passing blood clots and soaking through a pad each hour for 2 or more hours. · You are dizzy or lightheaded, or you feel like you may faint. · You have a fever. · You have new belly pain, or your pain gets worse. Watch closely for changes in your health, and be sure to contact your doctor if:  · Your vaginal bleeding seems to be getting heavier. · You have new or worse vaginal discharge. · You feel sad, anxious, or hopeless for more than a few days. · You do not get better as expected. Where can you learn more? Go to http://dorina-britt.info/. Enter A461 in the search box to learn more about \"After Your Delivery (the Postpartum Period): Care Instructions. \"  Current as of: May 30, 2016  Content Version: 11.1  © 4031-9867 [employer]. Care instructions adapted under license by [employer] (which disclaims liability or warranty for this information). If you have questions about a medical condition or this instruction, always ask your healthcare professional. [address] any warranty or liability for your use of this information.

## 2017-02-02 NOTE — TELEPHONE ENCOUNTER
23year old  30w5d pregnant patient last seen in the office on 17. Anh Cunha , from [facility] to confirm the prescribing MD for the patients breast pump. This nurse confirmed that Dr. Lawyer Vallejo is patients ordering MD. Anh Cunha verbalized understanding and confirmed office fax number.

## 2017-02-02 NOTE — TELEPHONE ENCOUNTER
23year old patient last seen in the office on 17.  30w5d pregnant patient . This nurse called the pharmacy and spoke to Anthony Clifton. The medication Nifedipine does not come in liquid and in tablet form. Pharmacy was wondering the reason for the medication. This nurse could not confirm the usage. Please advise and this nurse will call the pharmacy to see what other option might be.

## 2017-02-02 NOTE — DISCHARGE SUMMARY
Obstetrical Discharge Summary     Name: Ny Torres MRN: [id number]  SSN: [id number]    YOB: 1995  Age: 23 y.o. Sex: female      Admit Date: 2017    Discharge Date: 2017     Admitting Physician: Sneha Claudio MD     Attending Physician:  Sneha Claudio MD     Admission Diagnoses: pregnancy;Pregnancy    Discharge Diagnoses:   Information for the patient's :  Yaya Patsy Female [[id number]]   Delivery of a   female infant via Vaginal, Spontaneous Delivery on 2017 at 8:37 PM  by . Apgars were 6 and 8. Additional Diagnoses:   Hospital Problems  Date Reviewed: 2017          Codes Class Noted POA    Pregnancy ICD-10-CM: Z33.1  ICD-9-CM: V22.2  2017 Unknown             Lab Results   Component Value Date/Time    Rubella, External immune 2016       Hospital Course: Pt admitted with PTL, 2-3cm. Received steriods one week ago, GBS neg. Consult with MFM, tocolytics not indicated. Stable all day with minimal discomfort then suddenly 10 cm. Delivered quickly by . Baby in NICU on CPAP. Benign pp course    Patient Instructions:   Current Discharge Medication List      CONTINUE these medications which have NOT CHANGED    Details   prenatal multivit-ca-min-fe-fa tab Take  by mouth. STOP taking these medications       NIFEdipine (PROCARDIA) 10 mg capsule Comments:   Reason for Stopping:         terconazole (TERAZOL 3) 0.8 % vaginal cream Comments:   Reason for Stopping:               Reference my discharge instructions.     Follow-up Appointments   Procedures    FOLLOW UP VISIT Appointment in: 6 Weeks     Standing Status:   Standing     Number of Occurrences:   1     Order Specific Question:   Appointment in     Answer:   6 Weeks        Signed By:  Sneha Claudio MD     2017

## 2017-02-03 NOTE — PROGRESS NOTES
Post-Partum Day Number 2 Progress Note  Late entry. Seen and evaluated on 2017  [facility]     Information for the patient's :  Jamey Escobar Female [[id number]]   Vaginal, Spontaneous Delivery   Patient doing well without significant complaint. Voiding without difficulty, normal lochia. Vitals:  Visit Vitals    /60 (BP 1 Location: Right arm, BP Patient Position: At rest)    Pulse 77    Temp 98.2 °F (36.8 °C)    Resp 16    Ht 5' 1\" (1.549 m)    Wt 98 lb (44.5 kg)    LMP 2016    SpO2 99%    BMI 18.52 kg/m2     No data recorded. Exam:         Patient without distress. Abdomen soft, fundus firm, nontender                 ower extremities are negative for swelling, cords or tenderness. Labs:     Lab Results   Component Value Date/Time    WBC 12.5 2017 04:39 PM    WBC 12.0 2017 09:30 AM    WBC 12.7 2016 11:11 AM    HGB 11.8 2017 04:39 PM    HGB 12.5 2017 09:30 AM    HGB 13.0 2016 11:11 AM    HCT 35.2 2017 04:39 PM    HCT 36.8 2017 09:30 AM    HCT 39.3 2016 11:11 AM    PLATELET 498  04:39 PM    PLATELET 970  09:30 AM    PLATELET 367  11:11 AM    Hgb, External 12.5 2017    Hgb, External 13.0 2016    Hct, External 36.8 2017    Hct, External 39.3 2016    Platelet cnt., External 327 2017    Platelet cnt., External 313 2016       No results found for this or any previous visit (from the past 24 hour(s)). Assessment: Doing well, post partum day 2    Plan:   1. Discharge home today  2. Follow up in office in 6 weeks with Danna Harp MD  3. Post partum activity advised, diet as tolerated  4.  Discharge Medications: ibuprofen, percocet and medications prior to admission

## 2017-03-14 ENCOUNTER — OFFICE VISIT (OUTPATIENT)
Dept: OBGYN CLINIC | Age: 22
End: 2017-03-14

## 2017-03-14 VITALS
DIASTOLIC BLOOD PRESSURE: 60 MMHG | SYSTOLIC BLOOD PRESSURE: 102 MMHG | WEIGHT: 93.2 LBS | BODY MASS INDEX: 17.59 KG/M2 | HEIGHT: 61 IN

## 2017-03-14 RX ORDER — MEDROXYPROGESTERONE ACETATE 150 MG/ML
150 INJECTION, SUSPENSION INTRAMUSCULAR ONCE
Qty: 1 ML | Refills: 1 | Status: SHIPPED | OUTPATIENT
Start: 2017-03-14 | End: 2017-03-14

## 2017-03-14 NOTE — PROGRESS NOTES
Postpartum evaluation    Lamar Helton is a 23 y.o. female who presents for a postpartum exam.     She is now six weeks post vaginal delivery, delivered pre-term at 30w3d on 1/31/17    Her baby is doing well. She thinks she started her period 3/7/17    She has had the following significant problems since her delivery: none    The patient is bottle feeding without difficulty. Patient was pumping for a few weeks but has stopped. The patient would like to discuss birth control. She is currently taking: no medications. She is due for her next AE in August, appointment made.     Visit Vitals    /60    Ht 5' 1\" (1.549 m)    Wt 93 lb 3.2 oz (42.3 kg)    LMP 06/22/2016    Breastfeeding No    BMI 17.61 kg/m2       PHYSICAL EXAMINATION    Constitutional  · Appearance: well-nourished, well developed, alert, in no acute distress    HENT  · Head and Face: appears normal    Neck  · Inspection/Palpation: normal appearance, no masses or tenderness  · Lymph Nodes: no lymphadenopathy present  · Thyroid: gland size normal, nontender, no nodules or masses present on palpation    Breasts  · Inspection of Breasts: breasts symmetrical, no skin changes, no discharge present, nipple appearance normal, no skin retraction present  · Palpation of Breasts and Axillae: no masses present on palpation, no breast tenderness  · Axillary Lymph Nodes: no lymphadenopathy present    Gastrointestinal  · Abdominal Examination: abdomen non-tender to palpation, normal bowel sounds, no masses present  · Liver and spleen: no hepatomegaly present, spleen not palpable  · Hernias: no hernias identified    Genitourinary  · External Genitalia: normal appearance for age, no discharge present, no tenderness present, no inflammatory lesions present, no masses present, no atrophy present  · Vagina: normal vaginal vault without central or paravaginal defects, no discharge present, no inflammatory lesions present, no masses present  · Bladder: non-tender to palpation  · Urethra: appears normal  · Cervix: normal   · Uterus: normal size, shape and consistency  · Adnexa: no adnexal tenderness present, no adnexal masses present  · Perineum: perineum within normal limits, no evidence of trauma, no rashes or skin lesions present  · Anus: anus within normal limits, no hemorrhoids present  · Inguinal Lymph Nodes: no lymphadenopathy present    Skin  · General Inspection: no rash, no lesions identified    Neurologic/Psychiatric  · Mental Status:  · Orientation: grossly oriented to person, place and time  · Mood and Affect: mood normal, affect appropriate    Assessment:  Normal postpartum check    Plan:  RTO for AE.   Depo Provera

## 2017-03-17 RX ORDER — MEDROXYPROGESTERONE ACETATE 150 MG/ML
150 INJECTION, SUSPENSION INTRAMUSCULAR ONCE
Qty: 1 ML | Refills: 0 | Status: SHIPPED | OUTPATIENT
Start: 2017-03-17 | End: 2017-06-23 | Stop reason: SDUPTHER

## 2017-03-17 NOTE — TELEPHONE ENCOUNTER
Patient adalberto messaged that pharmacy did not have rx sent at [facility] visit.

## 2017-04-06 ENCOUNTER — OFFICE VISIT (OUTPATIENT)
Dept: OBGYN CLINIC | Age: 22
End: 2017-04-06

## 2017-04-06 DIAGNOSIS — Z78.9 USES CONTRACEPTION: Primary | ICD-10-CM

## 2017-04-06 LAB
HCG URINE, QL. (POC): NEGATIVE
VALID INTERNAL CONTROL?: YES

## 2017-04-06 NOTE — PROGRESS NOTES
Date last pap: Never had pap due to age/protocol. Last Annual 8/29/2016  Last Depo-Provera: First depo today  Side Effects if any: none   Serum HCG indicated? Negative  Depo-Provera 150 mg IM given by: Sue Morris LPN  Next appointment due 6/22/17-7/6/17    Administered 150mg/mL depo-provera per MD order. Injection given IM in left deltoid, per patient request. Urine pregnancy test was negative. Patient tolerated well, no complications. Advised patient of dates to return for next depo injection 6/22-7/6.  Patient verbalized understanding and will make appointment before she leaves today

## 2017-06-23 RX ORDER — MEDROXYPROGESTERONE ACETATE 150 MG/ML
INJECTION, SUSPENSION INTRAMUSCULAR
Qty: 1 SYRINGE | Refills: 0 | Status: SHIPPED | OUTPATIENT
Start: 2017-06-23 | End: 2017-09-12 | Stop reason: SDUPTHER

## 2017-06-23 NOTE — TELEPHONE ENCOUNTER
23year old patient last seen in the office on 3/14/17 for post partum visit. Patient has appointment for 8/30/17 for AE. Prescription refill sent as per MD order to patient preferred pharmacy.

## 2017-06-27 ENCOUNTER — OFFICE VISIT (OUTPATIENT)
Dept: OBGYN CLINIC | Age: 22
End: 2017-06-27

## 2017-06-27 DIAGNOSIS — Z78.9 USES CONTRACEPTION: Primary | ICD-10-CM

## 2017-06-27 NOTE — PROGRESS NOTES
Date last pap: Never had a pap smear due to age/protocol. Last AE 8/29/16  Last Depo-Provera: 4/6/17  Side Effects if any: none. Serum HCG indicated? N/A with in dates. Depo-Provera 150 mg IM given by: Myah Benson 399, LPN. Next appointment due 9/12/17-9/26/17. Administered 150mg/mL depo-provera injection per MD order. Injection given IM in left deltoid. Patient tolerated well no complications. Advised patient of dates next depo is due, patient verbalized understanding and will make appointment before she leaves today. Reminded patient of her appointment for her annual exam 8/30/17 and that her refills for depo will coincide with this appointment.

## 2017-08-30 RX ORDER — MEDROXYPROGESTERONE ACETATE 150 MG/ML
150 INJECTION, SUSPENSION INTRAMUSCULAR ONCE
Qty: 1 ML | Refills: 0 | Status: SHIPPED | OUTPATIENT
Start: 2017-08-30 | End: 2017-08-30

## 2017-09-12 ENCOUNTER — OFFICE VISIT (OUTPATIENT)
Dept: OBGYN CLINIC | Age: 22
End: 2017-09-12

## 2017-09-12 VITALS
HEIGHT: 61 IN | WEIGHT: 93.4 LBS | BODY MASS INDEX: 17.64 KG/M2 | SYSTOLIC BLOOD PRESSURE: 110 MMHG | DIASTOLIC BLOOD PRESSURE: 70 MMHG

## 2017-09-12 DIAGNOSIS — Z01.419 ROUTINE GYNECOLOGICAL EXAMINATION: Primary | ICD-10-CM

## 2017-09-12 DIAGNOSIS — Z23 ENCOUNTER FOR IMMUNIZATION: ICD-10-CM

## 2017-09-12 DIAGNOSIS — Z11.51 SPECIAL SCREENING EXAMINATION FOR HUMAN PAPILLOMAVIRUS (HPV): ICD-10-CM

## 2017-09-12 DIAGNOSIS — N91.2 AMENORRHEA DUE TO DEPO PROVERA: ICD-10-CM

## 2017-09-12 RX ORDER — MEDROXYPROGESTERONE ACETATE 150 MG/ML
INJECTION, SUSPENSION INTRAMUSCULAR
Qty: 1 SYRINGE | Refills: 4
Start: 2017-09-12 | End: 2020-11-05

## 2017-09-12 NOTE — PATIENT INSTRUCTIONS
Breast Self-Exam: Care Instructions  Your Care Instructions  A breast self-exam is when you check your breasts for lumps or changes. This regular exam helps you learn how your breasts normally look and feel. Most breast problems or changes are not because of cancer. Breast self-exam is not a substitute for a mammogram. Having regular breast exams by your doctor and regular mammograms improve your chances of finding any problems with your breasts. Some women set a time each month to do a step-by-step breast self-exam. Other women like a less formal system. They might look at their breasts as they brush their teeth, or feel their breasts once in a while in the shower. If you notice a change in your breast, tell your doctor. Follow-up care is a key part of your treatment and safety. Be sure to make and go to all appointments, and call your doctor if you are having problems. Its also a good idea to know your test results and keep a list of the medicines you take. How do you do a breast self-exam?  · The best time to examine your breasts is usually one week after your menstrual period begins. Your breasts should not be tender then. If you do not have periods, you might do your exam on a day of the month that is easy to remember. · To examine your breasts:  ¨ Remove all your clothes above the waist and lie down. When you are lying down, your breast tissue spreads evenly over your chest wall, which makes it easier to feel all your breast tissue. ¨ Use the pads--not the fingertips--of the 3 middle fingers of your left hand to check your right breast. Move your fingers slowly in small coin-sized circles that overlap. ¨ Use three levels of pressure to feel of all your breast tissue. Use light pressure to feel the tissue close to the skin surface. Use medium pressure to feel a little deeper. Use firm pressure to feel your tissue close to your breastbone and ribs.  Use each pressure level to feel your breast tissue before moving on to the next spot. ¨ Check your entire breast, moving up and down as if following a strip from the collarbone to the bra line, and from the armpit to the ribs. Repeat until you have covered the entire breast.  ¨ Repeat this procedure for your left breast, using the pads of the 3 middle fingers of your right hand. · To examine your breasts while in the shower:  ¨ Place one arm over your head and lightly soap your breast on that side. ¨ Using the pads of your fingers, gently move your hand over your breast (in the strip pattern described above), feeling carefully for any lumps or changes. ¨ Repeat for the other breast.  · Have your doctor inspect anything you notice to see if you need further testing. Where can you learn more? Go to http://dorina-britt.info/. Enter P148 in the search box to learn more about \"Breast Self-Exam: Care Instructions. \"  Current as of: July 26, 2016  Content Version: 11.3  © 1837-1008 [employer]. Care instructions adapted under license by [employer] (which disclaims liability or warranty for this information). If you have questions about a medical condition or this instruction, always ask your healthcare professional. [place] [address] any warranty or liability for your use of this information.

## 2017-09-12 NOTE — PROGRESS NOTES
Administered first Gardasil 9 vaccine per MD order. Patient consent signed. Injection given IM in right deltoid. Patient tolerated well, no complications. I advised patient to sit in waiting area for 10-15 minutes to make sure she felt fine and experienced no side effects. Advised patient when next 2 injections are due, second in 2 months, and 3rd 4 months after second. This will be a series of 3 injections over 6 months. When I went to check on her after 10 minutes she had left. Reception confirmed she made her next appointment and left the office.

## 2017-09-12 NOTE — PROGRESS NOTES
Bernie Ruiz is a ,  23 y.o. female [address] [facility] whose No LMP recorded. Patient has had an injection. who presents for her annual checkup. She is having no significant problems. With regard to the Gardisil vaccine, she has not received it yet. Menstrual status:    Her periods are light to moderate in flow. She is using three to five pads or tampons per day, minimal to none using Depoprovera. She denies dysmenorrhea. She reports no premenstrual symptoms. Contraception:    The current method of family planning is Depo-Provera injections and She declines contraception and counseling. Sexual history:    She  reports that she currently engages in sexual activity and has had male partners. She reports using the following method of birth control/protection: None. Medical conditions:    Since her last annual GYN exam about one year ago, she has not the following changes in her health history: none. Pap and Mammogram History:    She has never had a pap smear due to age/protocol. The patient has never had a mammogram.    The patient does not have a family history of breast cancer. History reviewed. No pertinent past medical history. History reviewed. No pertinent surgical history. Current Outpatient Prescriptions   Medication Sig Dispense Refill    medroxyPROGESTERone (DEPO-PROVERA) 150 mg/mL syrg INJECT POR VIA INTRAMUSCULAR ONCE 1 Syringe 0     Allergies: Review of patient's allergies indicates no known allergies. Social History     Social History    Marital status: SINGLE     Spouse name: N/A    Number of children: N/A    Years of education: N/A     Occupational History    Not on file.      Social History Main Topics    Smoking status: Never Smoker    Smokeless tobacco: Never Used    Alcohol use No    Drug use: No    Sexual activity: Yes     Partners: Male     Birth control/ protection: None     Other Topics Concern    Not on file     Social History Narrative     Tobacco History:  reports that she has never smoked. She has never used smokeless tobacco.  Alcohol Abuse:  reports that she does not drink alcohol. Drug Abuse:  reports that she does not use illicit drugs.     Patient Active Problem List   Diagnosis Code     labor in third trimester without delivery O60.03    Pregnancy Z33.1       Review of Systems - History obtained from the patient  Constitutional: negative for weight loss, fever, night sweats  HEENT: negative for hearing loss, earache, congestion, snoring, sorethroat  CV: negative for chest pain, palpitations, edema  Resp: negative for cough, shortness of breath, wheezing  GI: negative for change in bowel habits, abdominal pain, black or bloody stools  : negative for frequency, dysuria, hematuria, vaginal discharge  MSK: negative for back pain, joint pain, muscle pain  Breast: negative for breast lumps, nipple discharge, galactorrhea  Skin :negative for itching, rash, hives  Neuro: negative for dizziness, headache, confusion, weakness  Psych: negative for anxiety, depression, change in mood  Heme/lymph: negative for bleeding, bruising, pallor    Physical Exam    Visit Vitals    /70    Ht 5' 1\" (1.549 m)    Wt 93 lb 6.4 oz (42.4 kg)    BMI 17.65 kg/m2       Constitutional  · Appearance: well-nourished, well developed, alert, in no acute distress    HENT  · Head and Face: appears normal    Neck  · Inspection/Palpation: normal appearance, no masses or tenderness  · Lymph Nodes: no lymphadenopathy present  · Thyroid: gland size normal, nontender, no nodules or masses present on palpation    Chest  · Respiratory Effort: breathing normal  · Auscultation: normal breath sounds    Cardiovascular  · Heart:  · Auscultation: regular rate and rhythm without murmur    Breasts  · Inspection of Breasts: breasts symmetrical, no skin changes, no discharge present, nipple appearance normal, no skin retraction present  · Palpation of Breasts and Axillae: no masses present on palpation, no breast tenderness  · Axillary Lymph Nodes: no lymphadenopathy present    Gastrointestinal  · Abdominal Examination: abdomen non-tender to palpation, normal bowel sounds, no masses present  · Liver and spleen: no hepatomegaly present, spleen not palpable  · Hernias: no hernias identified    Genitourinary  · External Genitalia: normal appearance for age, no discharge present, no tenderness present, no inflammatory lesions present, no masses present, no atrophy present  · Vagina: normal vaginal vault without central or paravaginal defects, no discharge present, no inflammatory lesions present, no masses present  · Bladder: non-tender to palpation  · Urethra: appears normal  · Cervix: normal   · Uterus: normal size, shape and consistency  · Adnexa: no adnexal tenderness present, no adnexal masses present  · Perineum: perineum within normal limits, no evidence of trauma, no rashes or skin lesions present  · Anus: anus within normal limits, no hemorrhoids present  · Inguinal Lymph Nodes: no lymphadenopathy present    Skin  · General Inspection: no rash, no lesions identified    Neurologic/Psychiatric  · Mental Status:  · Orientation: grossly oriented to person, place and time  · Mood and Affect: mood normal, affect appropriate    . Assessment:  Routine gynecologic examination  Her current medical status is satisfactory with no evidence of significant gynecologic issues.   amenorrhea  Plan:  Counseled re: diet, exercise, healthy lifestyle  Return for yearly wellness visits  [address]  Cont FLAVIO Arellano today and with DP again in 3 months

## 2017-09-12 NOTE — PROGRESS NOTES
Date last pap: Never had a pap due to age/protocol. Last Depo-Provera: 6/27/2017. Side Effects if any: none. Serum HCG indicated? N/a with in dates. Depo-Provera 150 mg IM given by: Myah Nicole. Next appointment due 11/28/17-12/12/17  Administered 150mg/mL Depo-Provera injection per MD order. Injection given IM in left deltoid, per patient request. Patient tolerated well, no complications. Advised of dates next depo injection is due. She verbalized understanding and will make appointment before she leaves today.

## 2017-09-14 LAB
C TRACH RRNA SPEC QL NAA+PROBE: NEGATIVE
N GONORRHOEA RRNA SPEC QL NAA+PROBE: NEGATIVE
T VAGINALIS RRNA SPEC QL NAA+PROBE: NEGATIVE

## 2017-12-01 DIAGNOSIS — Z11.51 SPECIAL SCREENING EXAMINATION FOR HUMAN PAPILLOMAVIRUS (HPV): ICD-10-CM

## 2017-12-01 DIAGNOSIS — N91.2 AMENORRHEA DUE TO DEPO PROVERA: ICD-10-CM

## 2017-12-01 RX ORDER — MEDROXYPROGESTERONE ACETATE 150 MG/ML
INJECTION, SUSPENSION INTRAMUSCULAR
Refills: 0 | COMMUNITY
Start: 2017-09-11 | End: 2017-12-01 | Stop reason: SDUPTHER

## 2017-12-01 RX ORDER — MEDROXYPROGESTERONE ACETATE 150 MG/ML
INJECTION, SUSPENSION INTRAMUSCULAR
Qty: 1 ML | Refills: 3 | Status: SHIPPED | OUTPATIENT
Start: 2017-12-01 | End: 2018-10-23 | Stop reason: SDUPTHER

## 2017-12-05 ENCOUNTER — CLINICAL SUPPORT (OUTPATIENT)
Dept: OBGYN CLINIC | Age: 22
End: 2017-12-05

## 2017-12-05 DIAGNOSIS — N91.2 AMENORRHEA DUE TO DEPO PROVERA: Primary | ICD-10-CM

## 2017-12-05 NOTE — PROGRESS NOTES
Date last pap: Never had a pap smear due to age/protocol. Last Depo-Provera: 9/12/17. Side Effects if any: none. Serum HCG indicated? N/a with in dates. Depo-Provera 150 mg IM given by: Jose Loya. Next appointment due 2/20/18-3/6/18. Administered 150mg/mL Depo Provera per MD order. Injection given IM in left deltoid. Patient tolerated well, no complications. Advised of dates next depo is due. She verbalized understanding.

## 2018-02-01 ENCOUNTER — APPOINTMENT (OUTPATIENT)
Dept: GENERAL RADIOLOGY | Age: 23
End: 2018-02-01
Attending: PHYSICIAN ASSISTANT
Payer: MEDICAID

## 2018-02-01 ENCOUNTER — HOSPITAL ENCOUNTER (EMERGENCY)
Age: 23
Discharge: HOME | End: 2018-02-01
Attending: EMERGENCY MEDICINE
Payer: MEDICAID

## 2018-02-01 VITALS
WEIGHT: 95 LBS | RESPIRATION RATE: 14 BRPM | BODY MASS INDEX: 17.94 KG/M2 | OXYGEN SATURATION: 98 % | HEART RATE: 70 BPM | HEIGHT: 61 IN | DIASTOLIC BLOOD PRESSURE: 65 MMHG | TEMPERATURE: 99 F | SYSTOLIC BLOOD PRESSURE: 119 MMHG

## 2018-02-01 DIAGNOSIS — B34.9 VIRAL SYNDROME: Primary | ICD-10-CM

## 2018-02-01 LAB
ALBUMIN SERPL-MCNC: 4.4 G/DL (ref 3.5–5)
ALBUMIN/GLOB SERPL: 1.2 {RATIO} (ref 1.1–2.2)
ALP SERPL-CCNC: 112 U/L (ref 45–117)
ALT SERPL-CCNC: 18 U/L (ref 12–78)
ANION GAP SERPL CALC-SCNC: 10 MMOL/L (ref 5–15)
APPEARANCE UR: CLEAR
AST SERPL-CCNC: 14 U/L (ref 15–37)
BACTERIA URNS QL MICRO: NEGATIVE /HPF
BASOPHILS # BLD: 0 K/UL (ref 0–0.1)
BASOPHILS NFR BLD: 0 % (ref 0–1)
BILIRUB SERPL-MCNC: 0.3 MG/DL (ref 0.2–1)
BILIRUB UR QL: NEGATIVE
BUN SERPL-MCNC: 12 MG/DL (ref 6–20)
BUN/CREAT SERPL: 17 (ref 12–20)
CALCIUM SERPL-MCNC: 9.6 MG/DL (ref 8.5–10.1)
CHLORIDE SERPL-SCNC: 109 MMOL/L (ref 97–108)
CO2 SERPL-SCNC: 25 MMOL/L (ref 21–32)
COLOR UR: ABNORMAL
CREAT SERPL-MCNC: 0.69 MG/DL (ref 0.55–1.02)
DIFFERENTIAL METHOD BLD: NORMAL
EOSINOPHIL # BLD: 0.1 K/UL (ref 0–0.4)
EOSINOPHIL NFR BLD: 1 % (ref 0–7)
EPITH CASTS URNS QL MICRO: ABNORMAL /LPF
ERYTHROCYTE [DISTWIDTH] IN BLOOD BY AUTOMATED COUNT: 12.4 % (ref 11.5–14.5)
FLUAV AG NPH QL IA: NEGATIVE
FLUBV AG NOSE QL IA: NEGATIVE
GLOBULIN SER CALC-MCNC: 3.6 G/DL (ref 2–4)
GLUCOSE SERPL-MCNC: 91 MG/DL (ref 65–100)
GLUCOSE UR STRIP.AUTO-MCNC: NEGATIVE MG/DL
HCG UR QL: NEGATIVE
HCT VFR BLD AUTO: 39.6 % (ref 35–47)
HGB BLD-MCNC: 13 G/DL (ref 11.5–16)
HGB UR QL STRIP: NEGATIVE
IMM GRANULOCYTES # BLD: 0 K/UL (ref 0–0.04)
IMM GRANULOCYTES NFR BLD AUTO: 0 % (ref 0–0.5)
KETONES UR QL STRIP.AUTO: NEGATIVE MG/DL
LEUKOCYTE ESTERASE UR QL STRIP.AUTO: NEGATIVE
LIPASE SERPL-CCNC: 112 U/L (ref 73–393)
LYMPHOCYTES # BLD: 2.4 K/UL (ref 0.8–3.5)
LYMPHOCYTES NFR BLD: 27 % (ref 12–49)
MCH RBC QN AUTO: 27.5 PG (ref 26–34)
MCHC RBC AUTO-ENTMCNC: 32.8 G/DL (ref 30–36.5)
MCV RBC AUTO: 83.9 FL (ref 80–99)
MONOCYTES # BLD: 0.6 K/UL (ref 0–1)
MONOCYTES NFR BLD: 7 % (ref 5–13)
NEUTS SEG # BLD: 5.9 K/UL (ref 1.8–8)
NEUTS SEG NFR BLD: 65 % (ref 32–75)
NITRITE UR QL STRIP.AUTO: NEGATIVE
NRBC # BLD: 0 K/UL (ref 0–0.01)
NRBC BLD-RTO: 0 PER 100 WBC
PH UR STRIP: 5.5 [PH] (ref 5–8)
PLATELET # BLD AUTO: 280 K/UL (ref 150–400)
PMV BLD AUTO: 10.8 FL (ref 8.9–12.9)
POTASSIUM SERPL-SCNC: 3.7 MMOL/L (ref 3.5–5.1)
PROT SERPL-MCNC: 8 G/DL (ref 6.4–8.2)
PROT UR STRIP-MCNC: ABNORMAL MG/DL
RBC # BLD AUTO: 4.72 M/UL (ref 3.8–5.2)
RBC #/AREA URNS HPF: ABNORMAL /HPF (ref 0–5)
SODIUM SERPL-SCNC: 144 MMOL/L (ref 136–145)
SP GR UR REFRACTOMETRY: 1.01 (ref 1–1.03)
UROBILINOGEN UR QL STRIP.AUTO: 0.2 EU/DL (ref 0.2–1)
WBC # BLD AUTO: 9.1 K/UL (ref 3.6–11)
WBC URNS QL MICRO: ABNORMAL /HPF (ref 0–4)

## 2018-02-01 PROCEDURE — 81001 URINALYSIS AUTO W/SCOPE: CPT | Performed by: PHYSICIAN ASSISTANT

## 2018-02-01 PROCEDURE — 83690 ASSAY OF LIPASE: CPT | Performed by: PHYSICIAN ASSISTANT

## 2018-02-01 PROCEDURE — 81025 URINE PREGNANCY TEST: CPT

## 2018-02-01 PROCEDURE — 87086 URINE CULTURE/COLONY COUNT: CPT | Performed by: PHYSICIAN ASSISTANT

## 2018-02-01 PROCEDURE — 96360 HYDRATION IV INFUSION INIT: CPT

## 2018-02-01 PROCEDURE — 87804 INFLUENZA ASSAY W/OPTIC: CPT | Performed by: EMERGENCY MEDICINE

## 2018-02-01 PROCEDURE — 99284 EMERGENCY DEPT VISIT MOD MDM: CPT

## 2018-02-01 PROCEDURE — 36415 COLL VENOUS BLD VENIPUNCTURE: CPT | Performed by: PHYSICIAN ASSISTANT

## 2018-02-01 PROCEDURE — 80053 COMPREHEN METABOLIC PANEL: CPT | Performed by: PHYSICIAN ASSISTANT

## 2018-02-01 PROCEDURE — 71046 X-RAY EXAM CHEST 2 VIEWS: CPT

## 2018-02-01 PROCEDURE — 85025 COMPLETE CBC W/AUTO DIFF WBC: CPT | Performed by: PHYSICIAN ASSISTANT

## 2018-02-01 PROCEDURE — 74011250636 HC RX REV CODE- 250/636: Performed by: PHYSICIAN ASSISTANT

## 2018-02-01 PROCEDURE — 93005 ELECTROCARDIOGRAM TRACING: CPT

## 2018-02-01 RX ORDER — ONDANSETRON 4 MG/1
4 TABLET, ORALLY DISINTEGRATING ORAL
Qty: 10 TAB | Refills: 0 | Status: SHIPPED | OUTPATIENT
Start: 2018-02-01 | End: 2018-10-25

## 2018-02-01 RX ADMIN — SODIUM CHLORIDE 1000 ML: 900 INJECTION, SOLUTION INTRAVENOUS at 22:18

## 2018-02-02 LAB
ATRIAL RATE: 69 BPM
CALCULATED P AXIS, ECG09: 60 DEGREES
CALCULATED R AXIS, ECG10: 12 DEGREES
CALCULATED T AXIS, ECG11: 50 DEGREES
DIAGNOSIS, 93000: NORMAL
P-R INTERVAL, ECG05: 130 MS
Q-T INTERVAL, ECG07: 368 MS
QRS DURATION, ECG06: 80 MS
QTC CALCULATION (BEZET), ECG08: 394 MS
VENTRICULAR RATE, ECG03: 69 BPM

## 2018-02-02 NOTE — ED TRIAGE NOTES
Patient with c/o dizziness and weakness. States today has vomited once today just pta. States that when she eats her stomach hurts. Symptoms started about a week ago.

## 2018-02-02 NOTE — DISCHARGE INSTRUCTIONS
Viral Infections: Care Instructions  Your Care Instructions    You don't feel well, but it's not clear what's causing it. You may have a viral infection. Viruses cause many illnesses, such as the common cold, influenza, fever, rashes, and the diarrhea, nausea, and vomiting that are often called \"stomach flu. \" You may wonder if antibiotic medicines could make you feel better. But antibiotics only treat infections caused by bacteria. They don't work on viruses. The good news is that viral infections usually aren't serious. Most will go away in a few days without medical treatment. In the meantime, there are a few things you can do to make yourself more comfortable. Follow-up care is a key part of your treatment and safety. Be sure to make and go to all appointments, and call your doctor if you are having problems. It's also a good idea to know your test results and keep a list of the medicines you take. How can you care for yourself at home? · Get plenty of rest if you feel tired. · Take an over-the-counter pain medicine if needed, such as acetaminophen (Tylenol), ibuprofen (Advil, Motrin), or naproxen (Aleve). Read and follow all instructions on the label. · Be careful when taking over-the-counter cold or flu medicines and Tylenol at the same time. Many of these medicines have acetaminophen, which is Tylenol. Read the labels to make sure that you are not taking more than the recommended dose. Too much acetaminophen (Tylenol) can be harmful. · Drink plenty of fluids, enough so that your urine is light yellow or clear like water. If you have kidney, heart, or liver disease and have to limit fluids, talk with your doctor before you increase the amount of fluids you drink. · Stay home from work, school, and other public places while you have a fever. When should you call for help? Call 911 anytime you think you may need emergency care. For example, call if:  ? · You have severe trouble breathing.    ? · You passed out (lost consciousness). ?Call your doctor now or seek immediate medical care if:  ? · You seem to be getting much sicker. ? · You have a new or higher fever. ? · You have blood in your stools. ? · You have new belly pain, or your pain gets worse. ? · You have a new rash. ? Watch closely for changes in your health, and be sure to contact your doctor if:  ? · You start to get better and then get worse. ? · You do not get better as expected. Where can you learn more? Go to http://dorina-britt.info/. Enter G708 in the search box to learn more about \"Viral Infections: Care Instructions. \"  Current as of: March 3, 2017  Content Version: 11.4  © 8200-2249 [employer]. Care instructions adapted under license by [employer] (which disclaims liability or warranty for this information). If you have questions about a medical condition or this instruction, always ask your healthcare professional. [address] any warranty or liability for your use of this information.

## 2018-02-02 NOTE — ED PROVIDER NOTES
HPI Comments: Lauryn Norwood is a 26 y.o. female with no PMH presents to emergency room ambulatory for evaluation of lightheadedness, fatigue, nausea x 1 week, vomited once prior to arrival today only. Denies abd pain, cough, fever, diarrhea, rash. She states both her parents have been recently diagnosed with the flu but wasn't sure if they actually got the flu test performed on them. Denies pregnancy chance, states LMP was 1/29. No CP, SOB or pain at time of exam.    PCP: None    Surgical hx- none  Social hx- no tobacco, no ETOH    The patient has no other complaints at this time. Patient is a 26 y.o. female presenting with dizziness and general illness. The history is provided by the patient. Dizziness   Associated symptoms include vomiting (x1 today, non-bloody). Pertinent negatives include no shortness of breath, no chest pain, no confusion, no headaches and no nausea. Generalized Body Aches   Pertinent negatives include no chest pain, no abdominal pain, no headaches and no shortness of breath. History reviewed. No pertinent past medical history. History reviewed. No pertinent surgical history. History reviewed. No pertinent family history. Social History     Social History    Marital status: SINGLE     Spouse name: N/A    Number of children: N/A    Years of education: N/A     Occupational History    Not on file. Social History Main Topics    Smoking status: Never Smoker    Smokeless tobacco: Never Used    Alcohol use No    Drug use: No    Sexual activity: Yes     Partners: Male     Birth control/ protection: None     Other Topics Concern    Not on file     Social History Narrative         ALLERGIES: Review of patient's allergies indicates no known allergies. Review of Systems   Constitutional: Negative. Negative for activity change, chills, fatigue and unexpected weight change. Respiratory: Negative for cough, chest tightness, shortness of breath and wheezing. Cardiovascular: Negative. Negative for chest pain and palpitations. Gastrointestinal: Positive for vomiting (x1 today, non-bloody). Negative for abdominal pain, diarrhea and nausea. Genitourinary: Negative. Negative for dysuria, flank pain, frequency and hematuria. Musculoskeletal: Negative. Negative for arthralgias, back pain, neck pain and neck stiffness. Skin: Negative. Negative for color change and rash. Neurological: Positive for light-headedness. Negative for numbness and headaches. Psychiatric/Behavioral: Negative. Negative for confusion. All other systems reviewed and are negative. Vitals:    02/01/18 2045   BP: 119/65   Pulse: 76   Resp: 16   Temp: 99 °F (37.2 °C)   SpO2: 98%   Weight: 43.1 kg (95 lb)   Height: 5' 1\" (1.549 m)            Physical Exam   Constitutional: She is oriented to person, place, and time. She appears well-developed and well-nourished. She is active. Non-toxic appearance. No distress. HENT:   Head: Normocephalic and atraumatic. Right Ear: External ear normal.   Left Ear: External ear normal.   Nose: Nose normal.   Mouth/Throat: Oropharynx is clear and moist. No oropharyngeal exudate. Eyes: Conjunctivae are normal. Pupils are equal, round, and reactive to light. Right eye exhibits no discharge. Left eye exhibits no discharge. Neck: Normal range of motion and full passive range of motion without pain. Neck supple. No tracheal tenderness present. Cardiovascular: Normal rate, regular rhythm, normal heart sounds, intact distal pulses and normal pulses. Exam reveals no gallop and no friction rub. No murmur heard. Pulmonary/Chest: Effort normal and breath sounds normal. No respiratory distress. She has no wheezes. She has no rales. She exhibits no tenderness. Abdominal: Soft. Bowel sounds are normal. She exhibits no distension. There is no tenderness. There is no rebound and no guarding. Musculoskeletal: Normal range of motion.  She exhibits no edema or tenderness. Neurological: She is alert and oriented to person, place, and time. She has normal strength. No cranial nerve deficit or sensory deficit. Coordination normal.   Skin: Skin is warm, dry and intact. No abrasion and no rash noted. She is not diaphoretic. No erythema. Psychiatric: She has a normal mood and affect. Her speech is normal and behavior is normal. Cognition and memory are normal.   Nursing note and vitals reviewed. MDM  Number of Diagnoses or Management Options  Viral syndrome:   Diagnosis management comments:   DDx: viral syndrome, influenza, UTI, electrolyte abnormality, pregnancy       Amount and/or Complexity of Data Reviewed  Clinical lab tests: reviewed and ordered  Tests in the radiology section of CPT®: ordered and reviewed  Review and summarize past medical records: yes  Discuss the patient with other providers: yes    Patient Progress  Patient progress: stable        ED Course       Procedures    ED EKG interpretation:  Rhythm: normal sinus rhythm; Rate (approx.): 69 bpm; Axis: normal; ST/T wave: normal    Note written by Rose Lewis, as dictated by Ana Diggs. Wilfrido Dyson MD 10:12PM         DISCHARGE NOTE:  11:33 PM  The patient's results have been reviewed with them and/or available family. Patient and/or family verbally conveyed their understanding and agreement of the patient's signs, symptoms, diagnosis, treatment and prognosis and additionally agree to follow up as recommended in the discharge instructions or to return to the Emergency Room should their condition change prior to their follow-up appointment. The patient/family verbally agrees with the care-plan and verbally conveys that all of their questions have been answered.  The discharge instructions have also been provided to the patient and/or family with some educational information regarding the patient's diagnosis as well a list of reasons why the patient would want to return to the ER prior to their follow-up appointment, should their condition change. Plan:  1. F/U with PCP as neede  2. Rx Zofran  3.  Push liquids; continue motrin / tylenol for pain  Return precautions discussed and advised to return to ER if worse

## 2018-02-03 LAB
BACTERIA SPEC CULT: ABNORMAL
CC UR VC: ABNORMAL
SERVICE CMNT-IMP: ABNORMAL

## 2018-03-08 ENCOUNTER — CLINICAL SUPPORT (OUTPATIENT)
Dept: OBGYN CLINIC | Age: 23
End: 2018-03-08

## 2018-03-08 DIAGNOSIS — Z30.9 ENCOUNTER FOR CONTRACEPTIVE MANAGEMENT, UNSPECIFIED TYPE: ICD-10-CM

## 2018-03-08 DIAGNOSIS — Z23 ENCOUNTER FOR IMMUNIZATION: Primary | ICD-10-CM

## 2018-03-08 LAB
HCG URINE, QL. (POC): NEGATIVE
VALID INTERNAL CONTROL?: YES

## 2018-03-08 NOTE — PROGRESS NOTES
26year old patient given her   #2 Gardasil 9  0.5ml as per MD order. Patient signed the consent. Patient tolerated injection in left deltoid with out complications. Patient advised to get #3 Gardasil in two months, May 2018. Patient advised to schedule appointment prior to leaving office. Patient verbalized understanding. Date last pap: has not had a pap  Last Depo-Provera: 12/5/17  Side Effects if any: no reported by patient   Serum HCG indicated? Yes, upt negative  Depo-Provera 150 mg IM given by: Akanksha Chino RN  Next appointment due May 24 -June 7, 2018      Patient advised that she was beyond dates and we would do a UPT and that is she had intercourse in the past 10 days she could be pregnant and the test not show that. Patient advised of need to use condom for 1 month. Patient advised of possible break through bleeding due to beyond dates. Patient verbalized understanding and stated she is on her period. UPT performed and was negative Patient given 150 mg Depo injection as per MD order in right deltoid with out complications. Patient provided the dates for next injection of May 24-June 7,2018. Patient advised of need to schedule appointment for next injection prior to leaving office. Patient verbalized understanding.

## 2018-05-25 ENCOUNTER — CLINICAL SUPPORT (OUTPATIENT)
Dept: OBGYN CLINIC | Age: 23
End: 2018-05-25

## 2018-05-25 DIAGNOSIS — Z23 ENCOUNTER FOR IMMUNIZATION: Primary | ICD-10-CM

## 2018-05-25 DIAGNOSIS — Z30.42 ENCOUNTER FOR SURVEILLANCE OF INJECTABLE CONTRACEPTIVE: ICD-10-CM

## 2018-08-10 ENCOUNTER — CLINICAL SUPPORT (OUTPATIENT)
Dept: OBGYN CLINIC | Age: 23
End: 2018-08-10

## 2018-08-10 DIAGNOSIS — N91.2 AMENORRHEA DUE TO DEPO PROVERA: Primary | ICD-10-CM

## 2018-08-10 NOTE — PROGRESS NOTES
Date last pap: Never due to age/protocol. Last Depo-Provera: 5/25/2018  Side Effects if any: none. Serum HCG indicated? N/a with in dates. Depo-Provera 150 mg IM given by: Myah Benson 399, LPN. Next appointment due: 10/26-11/9  Administered 150mg/mL Depo per MD order. Verbal consent given by patient. Injection given IM in left deltoid. Patient tolerated well, no complications, no side effects. Advised of dates next injection is due. Patient verbalized understanding. Appointment for annual exam already made for 9/13/18.

## 2018-09-14 ENCOUNTER — APPOINTMENT (OUTPATIENT)
Dept: GENERAL RADIOLOGY | Age: 23
End: 2018-09-14
Attending: PHYSICIAN ASSISTANT
Payer: MEDICAID

## 2018-09-14 ENCOUNTER — HOSPITAL ENCOUNTER (EMERGENCY)
Age: 23
Discharge: HOME | End: 2018-09-14
Attending: EMERGENCY MEDICINE
Payer: MEDICAID

## 2018-09-14 VITALS
OXYGEN SATURATION: 100 % | HEIGHT: 61 IN | BODY MASS INDEX: 20.35 KG/M2 | DIASTOLIC BLOOD PRESSURE: 67 MMHG | WEIGHT: 107.81 LBS | SYSTOLIC BLOOD PRESSURE: 116 MMHG | TEMPERATURE: 98.5 F | HEART RATE: 83 BPM | RESPIRATION RATE: 16 BRPM

## 2018-09-14 DIAGNOSIS — R10.13 ABDOMINAL PAIN, EPIGASTRIC: Primary | ICD-10-CM

## 2018-09-14 DIAGNOSIS — K59.09 OTHER CONSTIPATION: ICD-10-CM

## 2018-09-14 LAB
ALBUMIN SERPL-MCNC: 3.7 G/DL (ref 3.5–5)
ALBUMIN/GLOB SERPL: 1 {RATIO} (ref 1.1–2.2)
ALP SERPL-CCNC: 119 U/L (ref 45–117)
ALT SERPL-CCNC: 21 U/L (ref 12–78)
ANION GAP SERPL CALC-SCNC: 11 MMOL/L (ref 5–15)
APPEARANCE UR: CLEAR
AST SERPL-CCNC: 16 U/L (ref 15–37)
BACTERIA URNS QL MICRO: NEGATIVE /HPF
BASOPHILS # BLD: 0 K/UL (ref 0–0.1)
BASOPHILS NFR BLD: 0 % (ref 0–1)
BILIRUB SERPL-MCNC: 0.3 MG/DL (ref 0.2–1)
BILIRUB UR QL: NEGATIVE
BUN SERPL-MCNC: 14 MG/DL (ref 6–20)
BUN/CREAT SERPL: 20 (ref 12–20)
CALCIUM SERPL-MCNC: 9.1 MG/DL (ref 8.5–10.1)
CHLORIDE SERPL-SCNC: 107 MMOL/L (ref 97–108)
CO2 SERPL-SCNC: 23 MMOL/L (ref 21–32)
COLOR UR: ABNORMAL
COMMENT, HOLDF: NORMAL
CREAT SERPL-MCNC: 0.71 MG/DL (ref 0.55–1.02)
DIFFERENTIAL METHOD BLD: NORMAL
EOSINOPHIL # BLD: 0.1 K/UL (ref 0–0.4)
EOSINOPHIL NFR BLD: 2 % (ref 0–7)
EPITH CASTS URNS QL MICRO: ABNORMAL /LPF
ERYTHROCYTE [DISTWIDTH] IN BLOOD BY AUTOMATED COUNT: 12.7 % (ref 11.5–14.5)
GLOBULIN SER CALC-MCNC: 3.7 G/DL (ref 2–4)
GLUCOSE SERPL-MCNC: 96 MG/DL (ref 65–100)
GLUCOSE UR STRIP.AUTO-MCNC: NEGATIVE MG/DL
HCG UR QL: NEGATIVE
HCT VFR BLD AUTO: 40.7 % (ref 35–47)
HGB BLD-MCNC: 13.1 G/DL (ref 11.5–16)
HGB UR QL STRIP: NEGATIVE
IMM GRANULOCYTES # BLD: 0 K/UL (ref 0–0.04)
IMM GRANULOCYTES NFR BLD AUTO: 0 % (ref 0–0.5)
KETONES UR QL STRIP.AUTO: NEGATIVE MG/DL
LEUKOCYTE ESTERASE UR QL STRIP.AUTO: NEGATIVE
LIPASE SERPL-CCNC: 129 U/L (ref 73–393)
LYMPHOCYTES # BLD: 1.9 K/UL (ref 0.8–3.5)
LYMPHOCYTES NFR BLD: 27 % (ref 12–49)
MCH RBC QN AUTO: 27.1 PG (ref 26–34)
MCHC RBC AUTO-ENTMCNC: 32.2 G/DL (ref 30–36.5)
MCV RBC AUTO: 84.3 FL (ref 80–99)
MONOCYTES # BLD: 0.6 K/UL (ref 0–1)
MONOCYTES NFR BLD: 8 % (ref 5–13)
MUCOUS THREADS URNS QL MICRO: ABNORMAL /LPF
NEUTS SEG # BLD: 4.4 K/UL (ref 1.8–8)
NEUTS SEG NFR BLD: 63 % (ref 32–75)
NITRITE UR QL STRIP.AUTO: NEGATIVE
NRBC # BLD: 0 K/UL (ref 0–0.01)
NRBC BLD-RTO: 0 PER 100 WBC
PH UR STRIP: 5.5 [PH] (ref 5–8)
PLATELET # BLD AUTO: 277 K/UL (ref 150–400)
PMV BLD AUTO: 10.5 FL (ref 8.9–12.9)
POTASSIUM SERPL-SCNC: 4 MMOL/L (ref 3.5–5.1)
PROT SERPL-MCNC: 7.4 G/DL (ref 6.4–8.2)
PROT UR STRIP-MCNC: NEGATIVE MG/DL
RBC # BLD AUTO: 4.83 M/UL (ref 3.8–5.2)
RBC #/AREA URNS HPF: ABNORMAL /HPF (ref 0–5)
SAMPLES BEING HELD,HOLD: NORMAL
SODIUM SERPL-SCNC: 141 MMOL/L (ref 136–145)
SP GR UR REFRACTOMETRY: 1.02 (ref 1–1.03)
UR CULT HOLD, URHOLD: NORMAL
UROBILINOGEN UR QL STRIP.AUTO: 0.2 EU/DL (ref 0.2–1)
WBC # BLD AUTO: 7 K/UL (ref 3.6–11)
WBC URNS QL MICRO: ABNORMAL /HPF (ref 0–4)

## 2018-09-14 PROCEDURE — 81025 URINE PREGNANCY TEST: CPT

## 2018-09-14 PROCEDURE — 99284 EMERGENCY DEPT VISIT MOD MDM: CPT

## 2018-09-14 PROCEDURE — 74022 RADEX COMPL AQT ABD SERIES: CPT

## 2018-09-14 PROCEDURE — 83690 ASSAY OF LIPASE: CPT | Performed by: PHYSICIAN ASSISTANT

## 2018-09-14 PROCEDURE — 81001 URINALYSIS AUTO W/SCOPE: CPT | Performed by: EMERGENCY MEDICINE

## 2018-09-14 PROCEDURE — 36415 COLL VENOUS BLD VENIPUNCTURE: CPT | Performed by: PHYSICIAN ASSISTANT

## 2018-09-14 PROCEDURE — 96374 THER/PROPH/DIAG INJ IV PUSH: CPT

## 2018-09-14 PROCEDURE — 80053 COMPREHEN METABOLIC PANEL: CPT | Performed by: PHYSICIAN ASSISTANT

## 2018-09-14 PROCEDURE — 74011000250 HC RX REV CODE- 250: Performed by: PHYSICIAN ASSISTANT

## 2018-09-14 PROCEDURE — 85025 COMPLETE CBC W/AUTO DIFF WBC: CPT | Performed by: PHYSICIAN ASSISTANT

## 2018-09-14 RX ORDER — FAMOTIDINE 20 MG/1
20 TABLET, FILM COATED ORAL 2 TIMES DAILY
Qty: 20 TAB | Refills: 0 | Status: SHIPPED | OUTPATIENT
Start: 2018-09-14 | End: 2018-10-25

## 2018-09-14 RX ORDER — POLYETHYLENE GLYCOL 3350 17 G/17G
17 POWDER, FOR SOLUTION ORAL 2 TIMES DAILY
Qty: 6 PACKET | Refills: 0 | Status: SHIPPED | OUTPATIENT
Start: 2018-09-14 | End: 2018-09-17

## 2018-09-14 RX ADMIN — FAMOTIDINE 20 MG: 10 INJECTION, SOLUTION INTRAVENOUS at 08:55

## 2018-09-14 NOTE — DISCHARGE INSTRUCTIONS
Abdominal Pain: Care Instructions  Your Care Instructions    Abdominal pain has many possible causes. Some aren't serious and get better on their own in a few days. Others need more testing and treatment. If your pain continues or gets worse, you need to be rechecked and may need more tests to find out what is wrong. You may need surgery to correct the problem. Don't ignore new symptoms, such as fever, nausea and vomiting, urination problems, pain that gets worse, and dizziness. These may be signs of a more serious problem. Your doctor may have recommended a follow-up visit in the next 8 to 12 hours. If you are not getting better, you may need more tests or treatment. The doctor has checked you carefully, but problems can develop later. If you notice any problems or new symptoms, get medical treatment right away. Follow-up care is a key part of your treatment and safety. Be sure to make and go to all appointments, and call your doctor if you are having problems. It's also a good idea to know your test results and keep a list of the medicines you take. How can you care for yourself at home? · Rest until you feel better. · To prevent dehydration, drink plenty of fluids, enough so that your urine is light yellow or clear like water. Choose water and other caffeine-free clear liquids until you feel better. If you have kidney, heart, or liver disease and have to limit fluids, talk with your doctor before you increase the amount of fluids you drink. · If your stomach is upset, eat mild foods, such as rice, dry toast or crackers, bananas, and applesauce. Try eating several small meals instead of two or three large ones. · Wait until 48 hours after all symptoms have gone away before you have spicy foods, alcohol, and drinks that contain caffeine. · Do not eat foods that are high in fat. · Avoid anti-inflammatory medicines such as aspirin, ibuprofen (Advil, Motrin), and naproxen (Aleve).  These can cause stomach upset. Talk to your doctor if you take daily aspirin for another health problem. When should you call for help? Call 911 anytime you think you may need emergency care. For example, call if:    · You passed out (lost consciousness).     · You pass maroon or very bloody stools.     · You vomit blood or what looks like coffee grounds.     · You have new, severe belly pain.    Call your doctor now or seek immediate medical care if:    · Your pain gets worse, especially if it becomes focused in one area of your belly.     · You have a new or higher fever.     · Your stools are black and look like tar, or they have streaks of blood.     · You have unexpected vaginal bleeding.     · You have symptoms of a urinary tract infection. These may include:  ¨ Pain when you urinate. ¨ Urinating more often than usual.  ¨ Blood in your urine.     · You are dizzy or lightheaded, or you feel like you may faint.    Watch closely for changes in your health, and be sure to contact your doctor if:    · You are not getting better after 1 day (24 hours). Where can you learn more? Go to http://dorinaHarirbritt.info/. Enter T432 in the search box to learn more about \"Abdominal Pain: Care Instructions. \"  Current as of: November 20, 2017  Content Version: 11.7  © 3342-0100 [employer]. Care instructions adapted under license by [employer] (which disclaims liability or warranty for this information). If you have questions about a medical condition or this instruction, always ask your healthcare professional. [place] [address] any warranty or liability for your use of this information. Constipation: Care Instructions  Your Care Instructions    Constipation means that you have a hard time passing stools (bowel movements). People pass stools from 3 times a day to once every 3 days. What is normal for you may be different.  Constipation may occur with pain in the rectum and cramping. The pain may get worse when you try to pass stools. Sometimes there are small amounts of bright red blood on toilet paper or the surface of stools. This is because of enlarged veins near the rectum (hemorrhoids). A few changes in your diet and lifestyle may help you avoid ongoing constipation. Your doctor may also prescribe medicine to help loosen your stool. Some medicines can cause constipation. These include pain medicines and antidepressants. Tell your doctor about all the medicines you take. Your doctor may want to make a medicine change to ease your symptoms. Follow-up care is a key part of your treatment and safety. Be sure to make and go to all appointments, and call your doctor if you are having problems. It's also a good idea to know your test results and keep a list of the medicines you take. How can you care for yourself at home? · Drink plenty of fluids, enough so that your urine is light yellow or clear like water. If you have kidney, heart, or liver disease and have to limit fluids, talk with your doctor before you increase the amount of fluids you drink. · Include high-fiber foods in your diet each day. These include fruits, vegetables, beans, and whole grains. · Get at least 30 minutes of exercise on most days of the week. Walking is a good choice. You also may want to do other activities, such as running, swimming, cycling, or playing tennis or team sports. · Take a fiber supplement, such as Citrucel or Metamucil, every day. Read and follow all instructions on the label. · Schedule time each day for a bowel movement. A daily routine may help. Take your time having your bowel movement. · Support your feet with a small step stool when you sit on the toilet. This helps flex your hips and places your pelvis in a squatting position. · Your doctor may recommend an over-the-counter laxative to relieve your constipation. Examples are Milk of Magnesia and MiraLax.  Read and follow all instructions on the label. Do not use laxatives on a long-term basis. When should you call for help? Call your doctor now or seek immediate medical care if:    · You have new or worse belly pain.     · You have new or worse nausea or vomiting.     · You have blood in your stools.    Watch closely for changes in your health, and be sure to contact your doctor if:    · Your constipation is getting worse.     · You do not get better as expected. Where can you learn more? Go to http://dorina-britt.info/. Enter 21 [phone] in the search box to learn more about \"Constipation: Care Instructions. \"  Current as of: November 20, 2017  Content Version: 11.7  © 0341-4743 [employer]. Care instructions adapted under license by [employer] (which disclaims liability or warranty for this information). If you have questions about a medical condition or this instruction, always ask your healthcare professional. [place] [address] any warranty or liability for your use of this information.

## 2018-09-14 NOTE — ED PROVIDER NOTES
HPI Comments: Mellissa Lozano is a 26 y.o. female who presents ambulatory with significant other to the ED with a c/o epigastric pain x 4 days, worse with eating. She notes constipation with increased straining with bms. Her last bm was this am and hard/ small. Pt denies n/v/d or f/c. She states her lmp was 8/27/18, but lighter than usual. Pt denies cp, sob, or urinary sx. Pt denies tx pta. She states her sx are intermittent and sharp. She denies a hx of abd surgery. PCP: None PMHx significant for:Past Medical History: 
No date: HPV vaccine counseling Comment: Pt completed Gardasil series PSHx significant for: No past surgical history on file. Social Hx: Tobacco: denies  EtOH: denies  Illicit drug use: denies There are no further complaints or symptoms at this time. The history is provided by the patient. Past Medical History:  
Diagnosis Date  HPV vaccine counseling Pt completed Gardasil series No past surgical history on file. No family history on file. Social History Social History  Marital status: SINGLE Spouse name: N/A  
 Number of children: N/A  
 Years of education: N/A Occupational History  Not on file. Social History Main Topics  Smoking status: Never Smoker  Smokeless tobacco: Never Used  Alcohol use No  
 Drug use: No  
 Sexual activity: Yes  
  Partners: Male Birth control/ protection: None Other Topics Concern  Not on file Social History Narrative ALLERGIES: Review of patient's allergies indicates no known allergies. Review of Systems Constitutional: Negative for chills and fever. HENT: Negative for congestion, rhinorrhea, sneezing and sore throat. Eyes: Negative for redness and visual disturbance. Respiratory: Negative for shortness of breath. Cardiovascular: Negative for chest pain and leg swelling. Gastrointestinal: Positive for abdominal pain and constipation.  Negative for nausea and vomiting. Genitourinary: Negative for difficulty urinating and frequency. Musculoskeletal: Negative for back pain, myalgias and neck stiffness. Skin: Negative for rash. Neurological: Negative for dizziness, syncope, weakness and headaches. Hematological: Negative for adenopathy. Patient Vitals for the past 12 hrs: 
 Temp Pulse Resp BP SpO2  
09/14/18 0930 - - - 116/67 100 % 09/14/18 0900 - - - 118/72 100 % 09/14/18 0814 98.5 °F (36.9 °C) 83 16 124/60 99 % Physical Exam  
Constitutional: She is oriented to person, place, and time. She appears well-developed and well-nourished. No distress. HENT:  
Head: Normocephalic and atraumatic. Right Ear: External ear normal.  
Left Ear: External ear normal.  
Eyes: EOM are normal. Pupils are equal, round, and reactive to light. Neck: Neck supple. Cardiovascular: Normal rate, regular rhythm, normal heart sounds and intact distal pulses. Exam reveals no gallop and no friction rub. No murmur heard. Pulmonary/Chest: Effort normal and breath sounds normal. No stridor. No respiratory distress. She has no wheezes. She has no rales. She exhibits no tenderness. Abdominal: Soft. Bowel sounds are normal. She exhibits no distension and no mass. There is tenderness. There is no rebound and no guarding. Mild diffuse ttp more focal to epigastric areas without rebounding or guarding. No CVAT Musculoskeletal: Normal range of motion. She exhibits no edema, tenderness or deformity. Neurological: She is alert and oriented to person, place, and time. No cranial nerve deficit. Coordination normal.  
Skin: No rash noted. No erythema. No pallor. Psychiatric: She has a normal mood and affect. Her behavior is normal.  
Nursing note and vitals reviewed. MDM Number of Diagnoses or Management Options Amount and/or Complexity of Data Reviewed Clinical lab tests: ordered and reviewed Tests in the radiology section of CPT®: ordered and reviewed Tests in the medicine section of CPT®: reviewed and ordered Obtain history from someone other than the patient: yes (Significant other) Review and summarize past medical records: yes Independent visualization of images, tracings, or specimens: yes Patient Progress Patient progress: stable ED Course Procedures 8:37 AM 
Discussed pt, sx, hx and current findings with Merari Salmon MD. he is in agreement with plan. Will get labs, urine and imaging Clarita Garcia. MAE Lopez 
 
10:03 AM  
Pt feeling better after meds. Updated on current findings. Will discharge with pepcid and miralax. Pt to follow with pcp 
Clarita Garcia. MAE Lopez 
 
LABORATORY TESTS: 
Recent Results (from the past 12 hour(s)) HCG URINE, QL. - POC Collection Time: 09/14/18  8:58 AM  
Result Value Ref Range Pregnancy test,urine (POC) NEGATIVE  NEG    
URINALYSIS W/MICROSCOPIC Collection Time: 09/14/18  9:00 AM  
Result Value Ref Range Color YELLOW/STRAW Appearance CLEAR CLEAR Specific gravity 1.021 1.003 - 1.030    
 pH (UA) 5.5 5.0 - 8.0 Protein NEGATIVE  NEG mg/dL Glucose NEGATIVE  NEG mg/dL Ketone NEGATIVE  NEG mg/dL Bilirubin NEGATIVE  NEG Blood NEGATIVE  NEG Urobilinogen 0.2 0.2 - 1.0 EU/dL Nitrites NEGATIVE  NEG Leukocyte Esterase NEGATIVE  NEG    
 WBC 0-4 0 - 4 /hpf  
 RBC 0-5 0 - 5 /hpf Epithelial cells MODERATE (A) FEW /lpf Bacteria NEGATIVE  NEG /hpf Mucus TRACE (A) NEG /lpf URINE CULTURE HOLD SAMPLE Collection Time: 09/14/18  9:00 AM  
Result Value Ref Range Urine culture hold URINE ON HOLD IN MICROBIOLOGY DEPT FOR 3 DAYS. IF UNPRESERVED URINE IS SUBMITTED, IT CANNOT BE USED FOR ADDITIONAL TESTING AFTER 24 HRS, RECOLLECTION WILL BE REQUIRED. SAMPLES BEING HELD Collection Time: 09/14/18  9:00 AM  
Result Value Ref Range SAMPLES BEING HELD 1BLU,1RED COMMENT Add-on orders for these samples will be processed based on acceptable specimen integrity and analyte stability, which may vary by analyte. CBC WITH AUTOMATED DIFF Collection Time: 09/14/18  9:00 AM  
Result Value Ref Range WBC 7.0 3.6 - 11.0 K/uL  
 RBC 4.83 3.80 - 5.20 M/uL  
 HGB 13.1 11.5 - 16.0 g/dL HCT 40.7 35.0 - 47.0 % MCV 84.3 80.0 - 99.0 FL  
 MCH 27.1 26.0 - 34.0 PG  
 MCHC 32.2 30.0 - 36.5 g/dL  
 RDW 12.7 11.5 - 14.5 % PLATELET 219 739 - 141 K/uL MPV 10.5 8.9 - 12.9 FL  
 NRBC 0.0 0  WBC ABSOLUTE NRBC 0.00 0.00 - 0.01 K/uL NEUTROPHILS 63 32 - 75 % LYMPHOCYTES 27 12 - 49 % MONOCYTES 8 5 - 13 % EOSINOPHILS 2 0 - 7 % BASOPHILS 0 0 - 1 % IMMATURE GRANULOCYTES 0 0.0 - 0.5 % ABS. NEUTROPHILS 4.4 1.8 - 8.0 K/UL  
 ABS. LYMPHOCYTES 1.9 0.8 - 3.5 K/UL  
 ABS. MONOCYTES 0.6 0.0 - 1.0 K/UL  
 ABS. EOSINOPHILS 0.1 0.0 - 0.4 K/UL  
 ABS. BASOPHILS 0.0 0.0 - 0.1 K/UL  
 ABS. IMM. GRANS. 0.0 0.00 - 0.04 K/UL  
 DF AUTOMATED METABOLIC PANEL, COMPREHENSIVE Collection Time: 09/14/18  9:00 AM  
Result Value Ref Range Sodium 141 136 - 145 mmol/L Potassium 4.0 3.5 - 5.1 mmol/L Chloride 107 97 - 108 mmol/L  
 CO2 23 21 - 32 mmol/L Anion gap 11 5 - 15 mmol/L Glucose 96 65 - 100 mg/dL BUN 14 6 - 20 MG/DL Creatinine 0.71 0.55 - 1.02 MG/DL  
 BUN/Creatinine ratio 20 12 - 20 GFR est AA >60 >60 ml/min/1.73m2 GFR est non-AA >60 >60 ml/min/1.73m2 Calcium 9.1 8.5 - 10.1 MG/DL Bilirubin, total 0.3 0.2 - 1.0 MG/DL  
 ALT (SGPT) 21 12 - 78 U/L  
 AST (SGOT) 16 15 - 37 U/L Alk. phosphatase 119 (H) 45 - 117 U/L Protein, total 7.4 6.4 - 8.2 g/dL Albumin 3.7 3.5 - 5.0 g/dL Globulin 3.7 2.0 - 4.0 g/dL A-G Ratio 1.0 (L) 1.1 - 2.2 LIPASE Collection Time: 09/14/18  9:00 AM  
Result Value Ref Range Lipase 129 73 - 393 U/L IMAGING RESULTS: 
 
Xr Abd Acute W 1 V Chest 
 
Result Date: 9/14/2018 EXAM:  XR ABD ACUTE W 1 V CHEST INDICATION:  Abdominal Pain COMPARISON: None. FINDINGS: The upright chest radiograph demonstrates clear lungs and normal cardiac and mediastinal contours. There is no pleural effusion or free air under the diaphragm. Supine and upright views of the abdomen demonstrate a nonobstructive bowel gas pattern. There is no free intraperitoneal air. There is mild fecal stasis in the right colon. . The bones are within normal limits. IMPRESSION: There is no free air. Gas pattern is within normal limits. MEDICATIONS GIVEN: 
Medications  
famotidine (PF) (PEPCID) 20 mg in sodium chloride 0.9% 10 mL injection (20 mg IntraVENous Given 9/14/18 0855) IMPRESSION: 
1. Abdominal pain, epigastric 2. Other constipation PLAN: 
1. Discharge Medication List as of 9/14/2018 10:03 AM  
  
START taking these medications Details  
famotidine (PEPCID) 20 mg tablet Take 1 Tab by mouth two (2) times a day., Print, Disp-20 Tab, R-0  
  
polyethylene glycol (MIRALAX) 17 gram packet Take 1 Packet by mouth two (2) times a day for 3 days. , Print, Disp-6 Packet, R-0  
  
  
CONTINUE these medications which have NOT CHANGED Details  
ondansetron (ZOFRAN ODT) 4 mg disintegrating tablet Take 1 Tab by mouth every eight (8) hours as needed for Nausea. , Print, Disp-10 Tab, R-0  
  
medroxyPROGESTERone (DEPO-PROVERA) 150 mg/mL injection INJECT 1 ML BY INTRAMUSCULAR ROUTE ONCE FOR 1 DOSE., Normal, Disp-1 mL, R-3  
  
medroxyPROGESTERone (DEPO-PROVERA) 150 mg/mL syrg INJECT POR VIA INTRAMUSCULAR ONCE, No Print, Disp-1 Syringe, R-4  
  
  
 
2. Follow-up Information Follow up With Details Comments Contact Info Bean Bedoya. Schedule an appointment as soon as possible for a visit 2-4 days for recheck  [facility] [phone] 
[phone] Return to ED if worse 10:05 AM 
 Pt has been reexamined. Pt has no new complaints, changes or physical findings. Care plan outlined and precautions discussed. All available results were reviewed with pt. All medications were reviewed with pt. All of pt's questions and concerns were addressed. Pt agrees to F/U as instructed and agrees to return to ED upon further deterioration. Pt is ready to go home.  
MAKAYLA Vega

## 2018-09-14 NOTE — LETTER
[address] 
[facility] EMERGENCY DEPT 
[address] [facility] [id number] [phone] Work/School Note Date: 9/14/2018 To Whom It May concern: 
 
Cecilia Locke was seen and treated today in the emergency room by the following provider(s): 
Attending Provider: Velvet Boston MD 
Physician Assistant: MAKAYLA Curran. Cecilia Locke may return to work on 9/16/18.  
 
Sincerely, 
 
 
 
 
MAKAYLA Curran

## 2018-10-23 RX ORDER — MEDROXYPROGESTERONE ACETATE 150 MG/ML
INJECTION, SUSPENSION INTRAMUSCULAR
Qty: 1 ML | Refills: 0 | Status: SHIPPED | OUTPATIENT
Start: 2018-10-23 | End: 2018-10-25 | Stop reason: SDUPTHER

## 2018-10-25 ENCOUNTER — OFFICE VISIT (OUTPATIENT)
Dept: OBGYN CLINIC | Age: 23
End: 2018-10-25

## 2018-10-25 VITALS
WEIGHT: 107.4 LBS | SYSTOLIC BLOOD PRESSURE: 110 MMHG | DIASTOLIC BLOOD PRESSURE: 74 MMHG | HEIGHT: 61 IN | BODY MASS INDEX: 20.28 KG/M2

## 2018-10-25 DIAGNOSIS — Z01.419 WELL WOMAN EXAM WITH ROUTINE GYNECOLOGICAL EXAM: Primary | ICD-10-CM

## 2018-10-25 DIAGNOSIS — N91.2 AMENORRHEA DUE TO DEPO PROVERA: ICD-10-CM

## 2018-10-25 RX ORDER — MEDROXYPROGESTERONE ACETATE 150 MG/ML
INJECTION, SUSPENSION INTRAMUSCULAR
Qty: 1 ML | Refills: 4 | Status: SHIPPED | OUTPATIENT
Start: 2018-10-25 | End: 2020-01-20

## 2018-10-25 NOTE — PATIENT INSTRUCTIONS

## 2018-10-25 NOTE — PROGRESS NOTES
Kenyon Fountain is a ,  26 y.o. female [address] [facility] whose Patient's last menstrual period was 10/11/2018 (exact date). was on 10/11/2018 who presents for her annual checkup. She is having no significant problems. With regard to the Gardisil vaccine, she has received all 3 injections. Menstrual status:    Her periods are moderate, minimal to nonexistent in flow. She is using three to five pads or tampons per day, usually regular and last 26-30 days. She denies dysmenorrhea. She reports no premenstrual symptoms. Contraception:    The current method of family planning is Depo-Provera injections and She declines contraception and counseling. Sexual history:    She  reports that she currently engages in sexual activity and has had partners who are Male. She reports using the following methods of birth control/protection: None and Injection. Medical conditions:    Since her last annual GYN exam about 19, she has not the following changes in her health history: none. Pap and Mammogram History:    She has never had a pap until today. The patient has never had a mammogram.    The patient does not have a family history of breast cancer. Past Medical History:   Diagnosis Date    HPV vaccine counseling     Pt completed Gardasil series     History reviewed. No pertinent surgical history. Current Outpatient Medications   Medication Sig Dispense Refill    medroxyPROGESTERone (DEPO-PROVERA) 150 mg/mL injection INJECT 1 ML BY INTRAMUSCULAR ROUTE ONCE FOR 1 DOSE. 1 mL 0    medroxyPROGESTERone (DEPO-PROVERA) 150 mg/mL syrg INJECT POR VIA INTRAMUSCULAR ONCE 1 Syringe 4    famotidine (PEPCID) 20 mg tablet Take 1 Tab by mouth two (2) times a day. 20 Tab 0    ondansetron (ZOFRAN ODT) 4 mg disintegrating tablet Take 1 Tab by mouth every eight (8) hours as needed for Nausea. 10 Tab 0     Allergies: Patient has no known allergies.    Social History     Socioeconomic History    Marital status: SINGLE     Spouse name: Not on file    Number of children: Not on file    Years of education: Not on file    Highest education level: Not on file   Social Needs    Financial resource strain: Not on file    Food insecurity - worry: Not on file    Food insecurity - inability: Not on file    Transportation needs - medical: Not on file   [employer] needs - non-medical: Not on file   Occupational History    Not on file   Tobacco Use    Smoking status: Never Smoker    Smokeless tobacco: Never Used   Substance and Sexual Activity    Alcohol use: No    Drug use: No    Sexual activity: Yes     Partners: Male     Birth control/protection: None, Injection   Other Topics Concern    Not on file   Social History Narrative    Not on file     Tobacco History:  reports that  has never smoked. she has never used smokeless tobacco.  Alcohol Abuse:  reports that she does not drink alcohol. Drug Abuse:  reports that she does not use drugs.     Patient Active Problem List   Diagnosis Code     labor in third trimester without delivery O60.03    Pregnancy Z34.90       Review of Systems - History obtained from the patient  Constitutional: negative for weight loss, fever, night sweats  HEENT: negative for hearing loss, earache, congestion, snoring, sorethroat  CV: negative for chest pain, palpitations, edema  Resp: negative for cough, shortness of breath, wheezing  GI: negative for change in bowel habits, abdominal pain, black or bloody stools  : negative for frequency, dysuria, hematuria, vaginal discharge  MSK: negative for back pain, joint pain, muscle pain  Breast: negative for breast lumps, nipple discharge, galactorrhea  Skin :negative for itching, rash, hives  Neuro: negative for dizziness, headache, confusion, weakness  Psych: negative for anxiety, depression, change in mood  Heme/lymph: negative for bleeding, bruising, pallor    Physical Exam    Visit Vitals  /74 (BP 1 Location: Left arm, BP Patient Position: Sitting)   Ht 5' 1\" (1.549 m)   Wt 107 lb 6.4 oz (48.7 kg)   LMP 10/11/2018 (Exact Date)   BMI 20.29 kg/m²       Constitutional  · Appearance: well-nourished, well developed, alert, in no acute distress    HENT  · Head and Face: appears normal    Neck  · Inspection/Palpation: normal appearance, no masses or tenderness  · Lymph Nodes: no lymphadenopathy present  · Thyroid: gland size normal, nontender, no nodules or masses present on palpation    Chest  · Respiratory Effort: breathing normal  · Auscultation: normal breath sounds    Cardiovascular  · Heart:  · Auscultation: regular rate and rhythm without murmur    Breasts  · Inspection of Breasts: breasts symmetrical, no skin changes, no discharge present, nipple appearance normal, no skin retraction present  · Palpation of Breasts and Axillae: no masses present on palpation, no breast tenderness  · Axillary Lymph Nodes: no lymphadenopathy present    Gastrointestinal  · Abdominal Examination: abdomen non-tender to palpation, normal bowel sounds, no masses present  · Liver and spleen: no hepatomegaly present, spleen not palpable  · Hernias: no hernias identified    Genitourinary  · External Genitalia: normal appearance for age, no discharge present, no tenderness present, no inflammatory lesions present, no masses present, no atrophy present  · Vagina: normal vaginal vault without central or paravaginal defects, no discharge present, no inflammatory lesions present, no masses present  · Bladder: non-tender to palpation  · Urethra: appears normal  · Cervix: normal   · Uterus: normal size, shape and consistency  · Adnexa: no adnexal tenderness present, no adnexal masses present  · Perineum: perineum within normal limits, no evidence of trauma, no rashes or skin lesions present  · Anus: anus within normal limits, no hemorrhoids present  · Inguinal Lymph Nodes: no lymphadenopathy present    Skin  · General Inspection: no rash, no lesions identified    Neurologic/Psychiatric  · Mental Status:  · Orientation: grossly oriented to person, place and time  · Mood and Affect: mood normal, affect appropriate    . Assessment:  Routine gynecologic examination  Her current medical status is satisfactory with no evidence of significant gynecologic issues.     Plan:  Counseled re: diet, exercise, healthy lifestyle  Return for yearly wellness visits  Pap/GCC  Cont DP

## 2018-10-26 NOTE — PROGRESS NOTES
Date last pap: 10/25/2018. Last Depo-Provera: 8/10/2018  Side Effects if any: none. Serum HCG indicated? N/a with in dates. Depo-Provera 150 mg IM given by: Myah Benson 399, LPN. Next appointment due 1/10/19-1/24/19. Administered 150mg/mL Depo Provera per MD order. Verbal consent given by patient. Injection given IM in left deltoid. Patient tolerated well, no complications, no side effects. Advised of dates next depo is due, she verbalized understanding.

## 2018-10-30 LAB
C TRACH RRNA CVX QL NAA+PROBE: NEGATIVE
CYTOLOGIST CVX/VAG CYTO: ABNORMAL
CYTOLOGY CVX/VAG DOC THIN PREP: ABNORMAL
DX ICD CODE: ABNORMAL
DX ICD CODE: ABNORMAL
LABCORP, 190119: ABNORMAL
Lab: ABNORMAL
N GONORRHOEA RRNA CVX QL NAA+PROBE: NEGATIVE
OTHER STN SPEC: ABNORMAL
PATH REPORT.FINAL DX SPEC: ABNORMAL
PATHOLOGIST CVX/VAG CYTO: ABNORMAL
STAT OF ADQ CVX/VAG CYTO-IMP: ABNORMAL
T VAGINALIS RRNA SPEC QL NAA+PROBE: NEGATIVE

## 2019-01-23 ENCOUNTER — CLINICAL SUPPORT (OUTPATIENT)
Dept: OBGYN CLINIC | Age: 24
End: 2019-01-23

## 2019-01-23 DIAGNOSIS — N91.2 AMENORRHEA DUE TO DEPO PROVERA: Primary | ICD-10-CM

## 2019-01-23 NOTE — PROGRESS NOTES
Date last pap: 10/25/2018  Last Depo-Provera: 10/25/2018  Side Effects if any: none  Serum HCG indicated? N/a   Depo-Provera 150 mg IM given by: Suly Shahid LPN  Next appointment due 4/10/19-4/24/19    Administered 150mg/mL Depo Provera per MD order. Verbal consent given by patient. Injection given IM in left deltoid. Patient tolerated well, no side effects, no complications. Advised of dates next injection is due. She verbalized understanding and will make appointment.

## 2019-04-23 ENCOUNTER — CLINICAL SUPPORT (OUTPATIENT)
Dept: OBGYN CLINIC | Age: 24
End: 2019-04-23

## 2019-04-23 DIAGNOSIS — Z30.9 PRESENCE OF OTHER CONTRACEPTIVE DEVICE: Primary | ICD-10-CM

## 2019-04-23 NOTE — PROGRESS NOTES
Date last pap: 10/25/18  Last Depo-Provera: 01/23/19  Side Effects if any: none  Serum HCG indicated? N/a, within dates  Depo-Provera 150 mg IM given by:  Valentina Rm LPN lot number [id number]  Exp 08/2021  Sandra Nunez- patient requested to use left deltoid. Reminded patient that it may be less painful to do gluteus as she has small arm muscles. Next appointment due 7/9/19-7/23/19    Patient tolerated injection without difficulty. Patient given dates of next injection and encouraged to schedule next injection at check out. Patient verbalized understanding.

## 2019-07-23 ENCOUNTER — CLINICAL SUPPORT (OUTPATIENT)
Dept: OBGYN CLINIC | Age: 24
End: 2019-07-23

## 2019-07-23 DIAGNOSIS — Z30.9 ENCOUNTER FOR CONTRACEPTIVE MANAGEMENT, UNSPECIFIED TYPE: Primary | ICD-10-CM

## 2019-07-23 NOTE — PROGRESS NOTES
Date last pap: 10/25/18  Last Depo-Provera: 4/23/19  Side Effects if any: patient denies side effects at this time  Serum HCG indicated? Patient within dates  Depo-Provera 150 mg IM given by: Robyn Chino RN  Next appointment due : October 8-October 22, 2019        27year old patient given her depo injection of 150 mg as per MD order . Patient tolerated injection in her right deltoid as requested by patient. Patient provided dates for next depo and advised of need to schedule appointment prior to leaving the office. Patient verbalized understanding.

## 2019-10-16 ENCOUNTER — HOSPITAL ENCOUNTER (EMERGENCY)
Age: 24
Discharge: HOME | End: 2019-10-16
Attending: EMERGENCY MEDICINE
Payer: MEDICAID

## 2019-10-16 ENCOUNTER — APPOINTMENT (OUTPATIENT)
Dept: CT IMAGING | Age: 24
End: 2019-10-16
Attending: PHYSICIAN ASSISTANT
Payer: MEDICAID

## 2019-10-16 VITALS
OXYGEN SATURATION: 99 % | HEIGHT: 61 IN | SYSTOLIC BLOOD PRESSURE: 134 MMHG | TEMPERATURE: 98.3 F | RESPIRATION RATE: 16 BRPM | WEIGHT: 122 LBS | BODY MASS INDEX: 23.03 KG/M2 | HEART RATE: 78 BPM | DIASTOLIC BLOOD PRESSURE: 70 MMHG

## 2019-10-16 DIAGNOSIS — R51.9 ACUTE NONINTRACTABLE HEADACHE, UNSPECIFIED HEADACHE TYPE: Primary | ICD-10-CM

## 2019-10-16 LAB
AMORPH CRY URNS QL MICRO: ABNORMAL
APPEARANCE UR: ABNORMAL
BACTERIA URNS QL MICRO: ABNORMAL /HPF
BILIRUB UR QL: NEGATIVE
COLOR UR: ABNORMAL
EPITH CASTS URNS QL MICRO: ABNORMAL /LPF
GLUCOSE BLD STRIP.AUTO-MCNC: 90 MG/DL (ref 65–100)
GLUCOSE UR STRIP.AUTO-MCNC: NEGATIVE MG/DL
HGB UR QL STRIP: ABNORMAL
KETONES UR QL STRIP.AUTO: NEGATIVE MG/DL
LEUKOCYTE ESTERASE UR QL STRIP.AUTO: ABNORMAL
NITRITE UR QL STRIP.AUTO: NEGATIVE
PH UR STRIP: 7.5 [PH] (ref 5–8)
PROT UR STRIP-MCNC: NEGATIVE MG/DL
RBC #/AREA URNS HPF: ABNORMAL /HPF (ref 0–5)
SERVICE CMNT-IMP: NORMAL
SP GR UR REFRACTOMETRY: 1.02 (ref 1–1.03)
UR CULT HOLD, URHOLD: NORMAL
UROBILINOGEN UR QL STRIP.AUTO: 1 EU/DL (ref 0.2–1)
WBC URNS QL MICRO: ABNORMAL /HPF (ref 0–4)

## 2019-10-16 PROCEDURE — 70450 CT HEAD/BRAIN W/O DYE: CPT

## 2019-10-16 PROCEDURE — 81001 URINALYSIS AUTO W/SCOPE: CPT

## 2019-10-16 PROCEDURE — 99282 EMERGENCY DEPT VISIT SF MDM: CPT

## 2019-10-16 PROCEDURE — 82962 GLUCOSE BLOOD TEST: CPT

## 2019-10-16 RX ORDER — PROCHLORPERAZINE EDISYLATE 5 MG/ML
10 INJECTION INTRAMUSCULAR; INTRAVENOUS
Status: DISCONTINUED | OUTPATIENT
Start: 2019-10-16 | End: 2019-10-17 | Stop reason: HOSPADM

## 2019-10-16 RX ORDER — DIPHENHYDRAMINE HYDROCHLORIDE 50 MG/ML
25 INJECTION, SOLUTION INTRAMUSCULAR; INTRAVENOUS
Status: DISCONTINUED | OUTPATIENT
Start: 2019-10-16 | End: 2019-10-17 | Stop reason: HOSPADM

## 2019-10-16 RX ORDER — KETOROLAC TROMETHAMINE 30 MG/ML
30 INJECTION, SOLUTION INTRAMUSCULAR; INTRAVENOUS
Status: DISCONTINUED | OUTPATIENT
Start: 2019-10-16 | End: 2019-10-17 | Stop reason: HOSPADM

## 2019-10-16 RX ORDER — IBUPROFEN 600 MG/1
600 TABLET ORAL
Qty: 20 TAB | Refills: 0 | Status: SHIPPED | OUTPATIENT
Start: 2019-10-16 | End: 2020-01-21

## 2019-10-16 NOTE — ED PROVIDER NOTES
27 y.o. female with no significant past medical history who presents from private vehicle with chief complaint of R sided headache intermittently x June. She denies trauma, head injury, hx of migraines. Pt c/o return of her headache, right-sided, headache since this morning with associated phonophobia. Pt rates her headache an \"8/10\" despite ibuprofen. Pt notes her last similar headache was 2 days ago. Pt reports her headaches are usually improved with sleeping and resolve in the morning, return in the afternoon / evening. Pt also c/o urinary frequency. Pt states she started Depo-Provera injections in 2017. Pt denies head injury, photophobia, vomiting, fever, chills, visual changes, abd pain, flank pain, or sore throat, or dysuria. There are no other acute medical concerns at this time. Social hx: Denies tobacco or alcohol use    PCP: Mitzy Jennings MD    Note written by Rose Soni, as dictated by Walter Vilchis PA-C 7:13 PM                The history is provided by the patient and a relative. No  was used. Past Medical History:   Diagnosis Date    Abnormal Pap smear of cervix 10/25/2018    LGSIL pap    HPV vaccine counseling     Pt completed Gardasil series       No past surgical history on file. No family history on file.     Social History     Socioeconomic History    Marital status: SINGLE     Spouse name: Not on file    Number of children: Not on file    Years of education: Not on file    Highest education level: Not on file   Occupational History    Not on file   Social Needs    Financial resource strain: Not on file    Food insecurity:     Worry: Not on file     Inability: Not on file    Transportation needs:     Medical: Not on file     Non-medical: Not on file   Tobacco Use    Smoking status: Never Smoker    Smokeless tobacco: Never Used   Substance and Sexual Activity    Alcohol use: No    Drug use: No    Sexual activity: Yes     Partners: Male Birth control/protection: None, Injection   Lifestyle    Physical activity:     Days per week: Not on file     Minutes per session: Not on file    Stress: Not on file   Relationships    Social connections:     Talks on phone: Not on file     Gets together: Not on file     Attends Advent service: Not on file     Active member of club or organization: Not on file     Attends meetings of clubs or organizations: Not on file     Relationship status: Not on file    Intimate partner violence:     Fear of current or ex partner: Not on file     Emotionally abused: Not on file     Physically abused: Not on file     Forced sexual activity: Not on file   Other Topics Concern    Not on file   Social History Narrative    Not on file         ALLERGIES: Patient has no known allergies. Review of Systems   Constitutional: Negative for chills and fever. HENT: Negative for sore throat. Eyes: Negative for photophobia. Gastrointestinal: Negative for vomiting. Endocrine: Positive for polydipsia. Genitourinary: Positive for frequency. Negative for dysuria. Neurological: Positive for headaches. All other systems reviewed and are negative. Vitals:    10/16/19 1907   BP: 134/70   Pulse: 78   Resp: 16   Temp: 98.3 °F (36.8 °C)   SpO2: 99%   Weight: 55.3 kg (122 lb)   Height: 5' 1\" (1.549 m)            Physical Exam   Constitutional: She is oriented to person, place, and time. She appears well-developed and well-nourished. She is active. Non-toxic appearance. No distress. HENT:   Head: Normocephalic and atraumatic. Right Ear: External ear normal.   Left Ear: External ear normal.   Nose: Nose normal.   Mouth/Throat: Oropharynx is clear and moist. No oropharyngeal exudate. Eyes: Pupils are equal, round, and reactive to light. Conjunctivae are normal. Right eye exhibits no discharge. Left eye exhibits no discharge. Neck: Normal range of motion and full passive range of motion without pain.  No tracheal tenderness present. No meningismus   Cardiovascular: Normal rate, regular rhythm, normal heart sounds, intact distal pulses and normal pulses. Exam reveals no gallop and no friction rub. No murmur heard. Pulmonary/Chest: Effort normal and breath sounds normal. No respiratory distress. She has no wheezes. She has no rales. She exhibits no tenderness. Abdominal: Soft. Bowel sounds are normal. She exhibits no distension. There is no tenderness. There is no rebound and no guarding. Musculoskeletal: Normal range of motion. She exhibits no edema or tenderness. Neurological: She is alert and oriented to person, place, and time. She has normal strength. No cranial nerve deficit or sensory deficit. Coordination normal.   Strength 5/5 in upper and lower extremities. NVI throughout. No focal neuro deficits. Skin: Skin is warm, dry and intact. Capillary refill takes less than 2 seconds. No abrasion and no rash noted. She is not diaphoretic. No erythema. Psychiatric: She has a normal mood and affect. Her speech is normal and behavior is normal. Cognition and memory are normal.   Nursing note and vitals reviewed. MDM  Number of Diagnoses or Management Options  Acute nonintractable headache, unspecified headache type:   Diagnosis management comments:   Ddx: cephalgia, migraine, tension headache       Amount and/or Complexity of Data Reviewed  Clinical lab tests: ordered and reviewed  Tests in the radiology section of CPT®: ordered and reviewed  Review and summarize past medical records: yes  Discuss the patient with other providers: yes    Patient Progress  Patient progress: stable         Procedures     Due to longevity of headaches x 4 months, will order head CT to r/o mass or other acute process, will treat headache. No neuro deficits. Marcelina Pablo PA-C    Patient re-assessed, feeling better, HA 3/10.  I advised to patient her headaches may be related to being on the Depo and she needs to discuss this with her GYN. She already has a scheduled appointment in 2 days with her GYN and advises she will bring this up. I advised she is at increased risk of stroke or other neurological problems, migraines, etc, with certain birth control medications if it causes headaches. Also instructed her to f/u with neurology due to longevity of symptoms for better management. Darryl Dale PA-C      LABORATORY TESTS:  Recent Results (from the past 12 hour(s))   GLUCOSE, POC    Collection Time: 10/16/19  7:23 PM   Result Value Ref Range    Glucose (POC) 90 65 - 100 mg/dL    Performed by Shelley Rodriguez (tech)    URINALYSIS W/MICROSCOPIC    Collection Time: 10/16/19  7:30 PM   Result Value Ref Range    Color YELLOW/STRAW      Appearance TURBID (A) CLEAR      Specific gravity 1.020 1.003 - 1.030      pH (UA) 7.5 5.0 - 8.0      Protein NEGATIVE  NEG mg/dL    Glucose NEGATIVE  NEG mg/dL    Ketone NEGATIVE  NEG mg/dL    Bilirubin NEGATIVE  NEG      Blood MODERATE (A) NEG      Urobilinogen 1.0 0.2 - 1.0 EU/dL    Nitrites NEGATIVE  NEG      Leukocyte Esterase SMALL (A) NEG      WBC 0-4 0 - 4 /hpf    RBC 0-5 0 - 5 /hpf    Epithelial cells MODERATE (A) FEW /lpf    Bacteria 1+ (A) NEG /hpf    Amorphous Crystals 2+ (A) NEG   URINE CULTURE HOLD SAMPLE    Collection Time: 10/16/19  7:30 PM   Result Value Ref Range    Urine culture hold        URINE ON HOLD IN MICROBIOLOGY DEPT FOR 3 DAYS. IF UNPRESERVED URINE IS SUBMITTED, IT CANNOT BE USED FOR ADDITIONAL TESTING AFTER 24 HRS, RECOLLECTION WILL BE REQUIRED.            MEDICATIONS GIVEN:  Medications   diphenhydrAMINE (BENADRYL) injection 25 mg (has no administration in time range)   prochlorperazine (COMPAZINE) injection 10 mg (has no administration in time range)   sodium chloride 0.9 % bolus infusion 1,000 mL (has no administration in time range)   ketorolac (TORADOL) injection 30 mg (has no administration in time range)         DISCHARGE NOTE:  The patient's results have been reviewed with them and/or available family. Patient and/or family verbally conveyed their understanding and agreement of the patient's signs, symptoms, diagnosis, treatment and prognosis and additionally agree to follow up as recommended in the discharge instructions or to return to the Emergency Room should their condition change prior to their follow-up appointment. The patient/family verbally agrees with the care-plan and verbally conveys that all of their questions have been answered. The discharge instructions have also been provided to the patient and/or family with some educational information regarding the patient's diagnosis as well a list of reasons why the patient would want to return to the ER prior to their follow-up appointment, should their condition change. Plan:  1. F/U with gyn, has appt in 2 days; neurology  2.  Return precautions discussed and advised to return to ER if worse

## 2019-10-16 NOTE — ED TRIAGE NOTES
Pt presents with right sided headache that has been occurring intermittently since June. Pt states this current headache's onset was this am. Denies any blurry vision, double vision, nausea, vomiting. Pt does report some dizziness.

## 2019-10-17 NOTE — DISCHARGE INSTRUCTIONS

## 2019-10-22 ENCOUNTER — CLINICAL SUPPORT (OUTPATIENT)
Dept: OBGYN CLINIC | Age: 24
End: 2019-10-22

## 2019-10-22 DIAGNOSIS — N91.2 AMENORRHEA DUE TO DEPO PROVERA: Primary | ICD-10-CM

## 2019-10-22 NOTE — PROGRESS NOTES
Date last pap: 10/25/18  Last Depo-Provera: 7/23/19  Side Effects if any: none  Serum HCG indicated? N/A  Depo-Provera 150 mg IM given by: Ludin Ray LPN  Next appointment due 1/7/20-1/21/20    Administered 150mg/mL Depo-Provera per MD order. Verbal consent given by patient. Injection given IM in the left deltoid . Patient tolerated well, no complications, no side effects. Advised patient dates for next depo injection.  Patient verbalized understanding    Lot # [id number]  Exp: 12/2021

## 2020-01-20 ENCOUNTER — TELEPHONE (OUTPATIENT)
Dept: OBGYN CLINIC | Age: 25
End: 2020-01-20

## 2020-01-20 RX ORDER — MEDROXYPROGESTERONE ACETATE 150 MG/ML
INJECTION, SUSPENSION INTRAMUSCULAR
Qty: 1 ML | Refills: 0 | Status: SHIPPED | OUTPATIENT
Start: 2020-01-20 | End: 2020-01-21 | Stop reason: SDUPTHER

## 2020-01-20 NOTE — TELEPHONE ENCOUNTER
23year old patient last seen in the office on 10/25/18 for AE and last depo injection on 10/22/19    Patient has next AE on 1/21/2019 .   Are you ok will one refill on injection to bring t appointment for depo injection ?    rx pended    Please advise

## 2020-01-21 ENCOUNTER — OFFICE VISIT (OUTPATIENT)
Dept: OBGYN CLINIC | Age: 25
End: 2020-01-21

## 2020-01-21 VITALS
SYSTOLIC BLOOD PRESSURE: 128 MMHG | DIASTOLIC BLOOD PRESSURE: 62 MMHG | HEIGHT: 61 IN | WEIGHT: 125 LBS | BODY MASS INDEX: 23.6 KG/M2

## 2020-01-21 DIAGNOSIS — Z11.3 SCREENING EXAMINATION FOR VENEREAL DISEASE: ICD-10-CM

## 2020-01-21 DIAGNOSIS — Z01.419 ENCOUNTER FOR GYNECOLOGICAL EXAMINATION WITHOUT ABNORMAL FINDING: Primary | ICD-10-CM

## 2020-01-21 DIAGNOSIS — N91.2 AMENORRHEA DUE TO DEPO PROVERA: ICD-10-CM

## 2020-01-21 RX ORDER — MEDROXYPROGESTERONE ACETATE 150 MG/ML
INJECTION, SUSPENSION INTRAMUSCULAR
Qty: 1 ML | Refills: 4 | Status: SHIPPED | OUTPATIENT
Start: 2020-01-21 | End: 2021-04-19 | Stop reason: SDUPTHER

## 2020-01-21 NOTE — PROGRESS NOTES
Michelle Jerry is a ,  23 y.o. female [address] [facility] whose No LMP recorded. Patient has had an injection. who presents for her annual checkup. She is having no significant problems. With regard to the Gardisil vaccine, she has received all 3 injections. Menstrual status:    Her periods are minimal to nonexistent in flow. She is using essentially none pads or tampons per day, minimal to none using Depoprovera. She denies dysmenorrhea. She reports no premenstrual symptoms. Contraception:    The current method of family planning is Depo-Provera injections and She declines contraception and counseling. Sexual history:    She  reports being sexually active and has had partner(s) who are Male. She reports using the following methods of birth control/protection: None and Injection. Medical conditions:    Since her last annual GYN exam about 10/25/2018 ago, she has not the following changes in her health history: none. Pap and Mammogram History:    Her most recent Pap smear was abnormal, LGSIL obtained 10/25/2018 year(s) ago. The patient has never had a mammogram.    The patient does not have a family history of breast cancer. Past Medical History:   Diagnosis Date    Abnormal Pap smear of cervix 10/25/2018    LGSIL pap    HPV vaccine counseling     Pt completed Gardasil series     History reviewed. No pertinent surgical history. Current Outpatient Medications   Medication Sig Dispense Refill    medroxyPROGESTERone (DEPO-PROVERA) 150 mg/mL injection INJECT 1 ML BY INTRAMUSCULAR ROUTE ONCE FOR 1 DOSE. 1 mL 0    medroxyPROGESTERone (DEPO-PROVERA) 150 mg/mL syrg INJECT POR VIA INTRAMUSCULAR ONCE 1 Syringe 4    ibuprofen (MOTRIN) 600 mg tablet Take 1 Tab by mouth every eight (8) hours as needed for Pain (take with food). 20 Tab 0     Allergies: Patient has no known allergies.    Social History     Socioeconomic History    Marital status: SINGLE     Spouse name: Not on file    Number of children: Not on file    Years of education: Not on file    Highest education level: Not on file   Occupational History    Not on file   Social Needs    Financial resource strain: Not on file    Food insecurity:     Worry: Not on file     Inability: Not on file    Transportation needs:     Medical: Not on file     Non-medical: Not on file   Tobacco Use    Smoking status: Never Smoker    Smokeless tobacco: Never Used   Substance and Sexual Activity    Alcohol use: No    Drug use: No    Sexual activity: Yes     Partners: Male     Birth control/protection: None, Injection   Lifestyle    Physical activity:     Days per week: Not on file     Minutes per session: Not on file    Stress: Not on file   Relationships    Social connections:     Talks on phone: Not on file     Gets together: Not on file     Attends Caodaism service: Not on file     Active member of club or organization: Not on file     Attends meetings of clubs or organizations: Not on file     Relationship status: Not on file    Intimate partner violence:     Fear of current or ex partner: Not on file     Emotionally abused: Not on file     Physically abused: Not on file     Forced sexual activity: Not on file   Other Topics Concern    Not on file   Social History Narrative    Not on file     Tobacco History:  reports that she has never smoked. She has never used smokeless tobacco.  Alcohol Abuse:  reports no history of alcohol use. Drug Abuse:  reports no history of drug use.     Patient Active Problem List   Diagnosis Code     labor in third trimester without delivery O60.03    Pregnancy Z34.90       Review of Systems - History obtained from the patient  Constitutional: negative for weight loss, fever, night sweats  HEENT: negative for hearing loss, earache, congestion, snoring, sorethroat  CV: negative for chest pain, palpitations, edema  Resp: negative for cough, shortness of breath, wheezing  GI: negative for change in bowel habits, abdominal pain, black or bloody stools  : negative for frequency, dysuria, hematuria, vaginal discharge  MSK: negative for back pain, joint pain, muscle pain  Breast: negative for breast lumps, nipple discharge, galactorrhea  Skin :negative for itching, rash, hives  Neuro: negative for dizziness, headache, confusion, weakness  Psych: negative for anxiety, depression, change in mood  Heme/lymph: negative for bleeding, bruising, pallor    Physical Exam    Visit Vitals  /62   Ht 5' 1\" (1.549 m)   Wt 125 lb (56.7 kg)   BMI 23.62 kg/m²       Constitutional  · Appearance: well-nourished, well developed, alert, in no acute distress    HENT  · Head and Face: appears normal    Neck  · Inspection/Palpation: normal appearance, no masses or tenderness  · Lymph Nodes: no lymphadenopathy present  · Thyroid: gland size normal, nontender, no nodules or masses present on palpation    Chest  · Respiratory Effort: breathing normal  · Auscultation: normal breath sounds    Cardiovascular  · Heart:  · Auscultation: regular rate and rhythm without murmur    Breasts  · Inspection of Breasts: breasts symmetrical, no skin changes, no discharge present, nipple appearance normal, no skin retraction present  · Palpation of Breasts and Axillae: no masses present on palpation, no breast tenderness  · Axillary Lymph Nodes: no lymphadenopathy present    Gastrointestinal  · Abdominal Examination: abdomen non-tender to palpation, normal bowel sounds, no masses present  · Liver and spleen: no hepatomegaly present, spleen not palpable  · Hernias: no hernias identified    Genitourinary  · External Genitalia: normal appearance for age, no discharge present, no tenderness present, no inflammatory lesions present, no masses present, no atrophy present  · Vagina: normal vaginal vault without central or paravaginal defects, no discharge present, no inflammatory lesions present, no masses present  · Bladder: non-tender to palpation  · Urethra: appears normal  · Cervix: normal   · Uterus: normal size, shape and consistency  · Adnexa: no adnexal tenderness present, no adnexal masses present  · Perineum: perineum within normal limits, no evidence of trauma, no rashes or skin lesions present  · Anus: anus within normal limits, no hemorrhoids present  · Inguinal Lymph Nodes: no lymphadenopathy present    Skin  · General Inspection: no rash, no lesions identified    Neurologic/Psychiatric  · Mental Status:  · Orientation: grossly oriented to person, place and time  · Mood and Affect: mood normal, affect appropriate    . Assessment:  Routine gynecologic examination  Her current medical status is satisfactory with no evidence of significant gynecologic issues.   Abnormal pap  Plan:  Counseled re: diet, exercise, healthy lifestyle  Return for yearly wellness visits  Cont DP  Pap  GCC

## 2020-01-21 NOTE — PROGRESS NOTES
Date last pap: 10/25/18- Pap done today 1/21/2020  Last Depo-Provera: 10/22/2019  Side Effects if any: none  Serum HCG indicated? N/A  Depo-Provera 150 mg IM given by: Myah Mackenzie LPN  Next appointment due 4/7/2020-4/21/2020     Administered 150mg/mL Depo-Provera per MD order. Verbal consent given by patient. Injection given IM in the left deltoid . Patient tolerated well, no complications, no side effects.     Advised patient dates for next depo injection.  Patient verbalized understanding

## 2020-01-24 LAB
C TRACH RRNA SPEC QL NAA+PROBE: NEGATIVE
N GONORRHOEA RRNA SPEC QL NAA+PROBE: NEGATIVE
T VAGINALIS DNA SPEC QL NAA+PROBE: NEGATIVE

## 2020-01-27 LAB
CYTOLOGIST CVX/VAG CYTO: ABNORMAL
CYTOLOGY CVX/VAG DOC CYTO: ABNORMAL
CYTOLOGY CVX/VAG DOC THIN PREP: ABNORMAL
CYTOLOGY HISTORY:: ABNORMAL
DX ICD CODE: ABNORMAL
DX ICD CODE: ABNORMAL
LABCORP, 190119: ABNORMAL
Lab: ABNORMAL
OTHER STN SPEC: ABNORMAL
PATHOLOGIST CVX/VAG CYTO: ABNORMAL
STAT OF ADQ CVX/VAG CYTO-IMP: ABNORMAL

## 2020-04-21 ENCOUNTER — CLINICAL SUPPORT (OUTPATIENT)
Dept: OBGYN CLINIC | Age: 25
End: 2020-04-21

## 2020-04-21 DIAGNOSIS — N91.2 AMENORRHEA DUE TO DEPO PROVERA: Primary | ICD-10-CM

## 2020-04-21 NOTE — PROGRESS NOTES
Date last pap: 1/21/2020. Last Depo-Provera: 1/21/2020. Side Effects if any: none. Serum HCG indicated? None-within dates. Depo-Provera 150 mg IM given by: Myah Benson 399, LPN. Next appointment due 7/7/20-7/21/20. Administered 150mg/mL Depo Provera per MD order. Verbal consent given by patient. Injection given IM in left deltoid. Parient tolerated well, no complications, no side effects.

## 2020-07-21 ENCOUNTER — CLINICAL SUPPORT (OUTPATIENT)
Dept: OBGYN CLINIC | Age: 25
End: 2020-07-21

## 2020-07-21 DIAGNOSIS — N91.2 AMENORRHEA DUE TO DEPO PROVERA: Primary | ICD-10-CM

## 2020-07-21 NOTE — PROGRESS NOTES
Depo 1 mL administered intramuscularly in the R Deltoid per MD Elmira Rosales verbal order  with verbal consent received from patient and two patient identifiers used. No UPT, within dates. Patient tolerated well with no reactions observed for ten minutes post injection. Next injection dates were written down for the patient and the patient was encouraged to schedule next injection at checkout. Patient verbalized understanding.   Lot # [id number]  Exp- 12/2021  Next appointment :10/06-10/20

## 2020-10-20 ENCOUNTER — CLINICAL SUPPORT (OUTPATIENT)
Dept: OBGYN CLINIC | Age: 25
End: 2020-10-20
Payer: MEDICAID

## 2020-10-20 DIAGNOSIS — N91.2 AMENORRHEA DUE TO DEPO PROVERA: Primary | ICD-10-CM

## 2020-10-20 PROCEDURE — 96372 THER/PROPH/DIAG INJ SC/IM: CPT | Performed by: OBSTETRICS & GYNECOLOGY

## 2020-10-20 NOTE — PROGRESS NOTES
Date last pap: 1/21/2020. Last Depo-Provera: 7/21/2020  Side Effects if any: none. Serum HCG indicated? None-within dates. Depo-Provera 150 mg IM given by: Myah Benson 399, LPN. Next appointment due 1/5/21-1/19/21  Administered 150mg/mL Depo Provera per MD order. Verbal consent given by patient. Injection given IM in left deltoid. Parient tolerated well, no complications, no side effects.

## 2020-11-05 ENCOUNTER — ANESTHESIA EVENT (OUTPATIENT)
Dept: SURGERY | Age: 25
End: 2020-11-05
Payer: MEDICAID

## 2020-11-05 ENCOUNTER — APPOINTMENT (OUTPATIENT)
Dept: CT IMAGING | Age: 25
End: 2020-11-05
Attending: EMERGENCY MEDICINE
Payer: MEDICAID

## 2020-11-05 ENCOUNTER — ANESTHESIA (OUTPATIENT)
Dept: SURGERY | Age: 25
End: 2020-11-05
Payer: MEDICAID

## 2020-11-05 ENCOUNTER — HOSPITAL ENCOUNTER (EMERGENCY)
Age: 25
Discharge: HOME | End: 2020-11-05
Attending: EMERGENCY MEDICINE | Admitting: EMERGENCY MEDICINE
Payer: MEDICAID

## 2020-11-05 VITALS
TEMPERATURE: 98.3 F | SYSTOLIC BLOOD PRESSURE: 113 MMHG | HEIGHT: 61 IN | DIASTOLIC BLOOD PRESSURE: 57 MMHG | BODY MASS INDEX: 22.66 KG/M2 | HEART RATE: 67 BPM | WEIGHT: 120 LBS | RESPIRATION RATE: 14 BRPM | OXYGEN SATURATION: 99 %

## 2020-11-05 DIAGNOSIS — K35.30 ACUTE APPENDICITIS WITH LOCALIZED PERITONITIS, WITHOUT PERFORATION, ABSCESS, OR GANGRENE: Primary | ICD-10-CM

## 2020-11-05 LAB
ALBUMIN SERPL-MCNC: 4.3 G/DL (ref 3.5–5)
ALBUMIN/GLOB SERPL: 1.1 {RATIO} (ref 1.1–2.2)
ALP SERPL-CCNC: 136 U/L (ref 45–117)
ALT SERPL-CCNC: 32 U/L (ref 12–78)
ANION GAP SERPL CALC-SCNC: 6 MMOL/L (ref 5–15)
APPEARANCE UR: ABNORMAL
AST SERPL-CCNC: 20 U/L (ref 15–37)
BACTERIA URNS QL MICRO: ABNORMAL /HPF
BASOPHILS # BLD: 0 K/UL (ref 0–0.1)
BASOPHILS NFR BLD: 0 % (ref 0–1)
BILIRUB SERPL-MCNC: 0.5 MG/DL (ref 0.2–1)
BILIRUB UR QL CFM: NEGATIVE
BUN SERPL-MCNC: 10 MG/DL (ref 6–20)
BUN/CREAT SERPL: 13 (ref 12–20)
CALCIUM SERPL-MCNC: 9.5 MG/DL (ref 8.5–10.1)
CHLORIDE SERPL-SCNC: 107 MMOL/L (ref 97–108)
CO2 SERPL-SCNC: 24 MMOL/L (ref 21–32)
COLOR UR: ABNORMAL
COMMENT, HOLDF: NORMAL
CREAT SERPL-MCNC: 0.77 MG/DL (ref 0.55–1.02)
DIFFERENTIAL METHOD BLD: ABNORMAL
EOSINOPHIL # BLD: 0.1 K/UL (ref 0–0.4)
EOSINOPHIL NFR BLD: 1 % (ref 0–7)
EPITH CASTS URNS QL MICRO: ABNORMAL /LPF
ERYTHROCYTE [DISTWIDTH] IN BLOOD BY AUTOMATED COUNT: 12.7 % (ref 11.5–14.5)
GLOBULIN SER CALC-MCNC: 4 G/DL (ref 2–4)
GLUCOSE SERPL-MCNC: 92 MG/DL (ref 65–100)
GLUCOSE UR STRIP.AUTO-MCNC: NEGATIVE MG/DL
HCG UR QL: NEGATIVE
HCT VFR BLD AUTO: 43.3 % (ref 35–47)
HGB BLD-MCNC: 14.4 G/DL (ref 11.5–16)
HGB UR QL STRIP: NEGATIVE
HYALINE CASTS URNS QL MICRO: ABNORMAL /LPF (ref 0–5)
IMM GRANULOCYTES # BLD AUTO: 0 K/UL (ref 0–0.04)
IMM GRANULOCYTES NFR BLD AUTO: 0 % (ref 0–0.5)
KETONES UR QL STRIP.AUTO: ABNORMAL MG/DL
LEUKOCYTE ESTERASE UR QL STRIP.AUTO: ABNORMAL
LIPASE SERPL-CCNC: 68 U/L (ref 73–393)
LYMPHOCYTES # BLD: 2.3 K/UL (ref 0.8–3.5)
LYMPHOCYTES NFR BLD: 18 % (ref 12–49)
MCH RBC QN AUTO: 27.4 PG (ref 26–34)
MCHC RBC AUTO-ENTMCNC: 33.3 G/DL (ref 30–36.5)
MCV RBC AUTO: 82.3 FL (ref 80–99)
MONOCYTES # BLD: 0.8 K/UL (ref 0–1)
MONOCYTES NFR BLD: 6 % (ref 5–13)
NEUTS SEG # BLD: 9.2 K/UL (ref 1.8–8)
NEUTS SEG NFR BLD: 75 % (ref 32–75)
NITRITE UR QL STRIP.AUTO: NEGATIVE
NRBC # BLD: 0 K/UL (ref 0–0.01)
NRBC BLD-RTO: 0 PER 100 WBC
PH UR STRIP: 6.5 [PH] (ref 5–8)
PLATELET # BLD AUTO: 326 K/UL (ref 150–400)
PMV BLD AUTO: 11 FL (ref 8.9–12.9)
POTASSIUM SERPL-SCNC: 3.7 MMOL/L (ref 3.5–5.1)
PROT SERPL-MCNC: 8.3 G/DL (ref 6.4–8.2)
PROT UR STRIP-MCNC: ABNORMAL MG/DL
RBC # BLD AUTO: 5.26 M/UL (ref 3.8–5.2)
RBC #/AREA URNS HPF: ABNORMAL /HPF (ref 0–5)
SAMPLES BEING HELD,HOLD: NORMAL
SODIUM SERPL-SCNC: 137 MMOL/L (ref 136–145)
SP GR UR REFRACTOMETRY: 1.03 (ref 1–1.03)
UR CULT HOLD, URHOLD: NORMAL
UROBILINOGEN UR QL STRIP.AUTO: 1 EU/DL (ref 0.2–1)
WBC # BLD AUTO: 12.4 K/UL (ref 3.6–11)
WBC URNS QL MICRO: ABNORMAL /HPF (ref 0–4)

## 2020-11-05 PROCEDURE — 2709999900 HC NON-CHARGEABLE SUPPLY: Performed by: SURGERY

## 2020-11-05 PROCEDURE — 74011250636 HC RX REV CODE- 250/636: Performed by: NURSE ANESTHETIST, CERTIFIED REGISTERED

## 2020-11-05 PROCEDURE — 77030010513 HC APPL CLP LIG J&J -C: Performed by: SURGERY

## 2020-11-05 PROCEDURE — 81025 URINE PREGNANCY TEST: CPT

## 2020-11-05 PROCEDURE — 76210000020 HC REC RM PH II FIRST 0.5 HR: Performed by: SURGERY

## 2020-11-05 PROCEDURE — 74011250636 HC RX REV CODE- 250/636: Performed by: EMERGENCY MEDICINE

## 2020-11-05 PROCEDURE — 77030010507 HC ADH SKN DERMBND J&J -B: Performed by: SURGERY

## 2020-11-05 PROCEDURE — 76010000138 HC OR TIME 0.5 TO 1 HR: Performed by: SURGERY

## 2020-11-05 PROCEDURE — 80053 COMPREHEN METABOLIC PANEL: CPT

## 2020-11-05 PROCEDURE — 99285 EMERGENCY DEPT VISIT HI MDM: CPT

## 2020-11-05 PROCEDURE — 76060000032 HC ANESTHESIA 0.5 TO 1 HR: Performed by: SURGERY

## 2020-11-05 PROCEDURE — 74011000250 HC RX REV CODE- 250: Performed by: NURSE ANESTHETIST, CERTIFIED REGISTERED

## 2020-11-05 PROCEDURE — 77030020829: Performed by: SURGERY

## 2020-11-05 PROCEDURE — 77030012770 HC TRCR OPT FX AMR -B: Performed by: SURGERY

## 2020-11-05 PROCEDURE — 96375 TX/PRO/DX INJ NEW DRUG ADDON: CPT

## 2020-11-05 PROCEDURE — 77030020053 HC ELECTRD LAPSCP COVD -B: Performed by: SURGERY

## 2020-11-05 PROCEDURE — 88304 TISSUE EXAM BY PATHOLOGIST: CPT

## 2020-11-05 PROCEDURE — 36415 COLL VENOUS BLD VENIPUNCTURE: CPT

## 2020-11-05 PROCEDURE — 81001 URINALYSIS AUTO W/SCOPE: CPT

## 2020-11-05 PROCEDURE — 77030008608 HC TRCR ENDOSC SMTH AMR -B: Performed by: SURGERY

## 2020-11-05 PROCEDURE — 77030009851 HC PCH RTVR ENDOSC AMR -B: Performed by: SURGERY

## 2020-11-05 PROCEDURE — 85025 COMPLETE CBC W/AUTO DIFF WBC: CPT

## 2020-11-05 PROCEDURE — 77030008684 HC TU ET CUF COVD -B: Performed by: ANESTHESIOLOGY

## 2020-11-05 PROCEDURE — 77030031139 HC SUT VCRL2 J&J -A: Performed by: SURGERY

## 2020-11-05 PROCEDURE — 74011000258 HC RX REV CODE- 258: Performed by: EMERGENCY MEDICINE

## 2020-11-05 PROCEDURE — 77030018836 HC SOL IRR NACL ICUM -A: Performed by: SURGERY

## 2020-11-05 PROCEDURE — 83690 ASSAY OF LIPASE: CPT

## 2020-11-05 PROCEDURE — 77030036731 HC STPLR ENDOSC J&J -F: Performed by: SURGERY

## 2020-11-05 PROCEDURE — 77030035029 HC NDL INSUF VERES DISP COVD -B: Performed by: SURGERY

## 2020-11-05 PROCEDURE — 74011000636 HC RX REV CODE- 636: Performed by: RADIOLOGY

## 2020-11-05 PROCEDURE — 96374 THER/PROPH/DIAG INJ IV PUSH: CPT

## 2020-11-05 PROCEDURE — 77030009963 HC RELD STPLR ECR J&J -C: Performed by: SURGERY

## 2020-11-05 PROCEDURE — 74011000250 HC RX REV CODE- 250: Performed by: SURGERY

## 2020-11-05 PROCEDURE — 74011000258 HC RX REV CODE- 258: Performed by: NURSE ANESTHETIST, CERTIFIED REGISTERED

## 2020-11-05 PROCEDURE — 77030027876 HC STPLR ENDOSC FLX PWR J&J -G1: Performed by: SURGERY

## 2020-11-05 PROCEDURE — 77030026438 HC STYL ET INTUB CARD -A: Performed by: ANESTHESIOLOGY

## 2020-11-05 PROCEDURE — 76210000006 HC OR PH I REC 0.5 TO 1 HR: Performed by: SURGERY

## 2020-11-05 PROCEDURE — 74011250636 HC RX REV CODE- 250/636: Performed by: PHYSICIAN ASSISTANT

## 2020-11-05 PROCEDURE — 74177 CT ABD & PELVIS W/CONTRAST: CPT

## 2020-11-05 RX ORDER — HYDROMORPHONE HYDROCHLORIDE 1 MG/ML
.25-1 INJECTION, SOLUTION INTRAMUSCULAR; INTRAVENOUS; SUBCUTANEOUS
Status: DISCONTINUED | OUTPATIENT
Start: 2020-11-05 | End: 2020-11-06 | Stop reason: HOSPADM

## 2020-11-05 RX ORDER — SUCCINYLCHOLINE CHLORIDE 20 MG/ML
INJECTION INTRAMUSCULAR; INTRAVENOUS AS NEEDED
Status: DISCONTINUED | OUTPATIENT
Start: 2020-11-05 | End: 2020-11-05 | Stop reason: HOSPADM

## 2020-11-05 RX ORDER — OXYCODONE HYDROCHLORIDE 5 MG/1
5 TABLET ORAL
Qty: 15 TAB | Refills: 0 | Status: SHIPPED | OUTPATIENT
Start: 2020-11-05 | End: 2020-11-08

## 2020-11-05 RX ORDER — SODIUM CHLORIDE, SODIUM LACTATE, POTASSIUM CHLORIDE, CALCIUM CHLORIDE 600; 310; 30; 20 MG/100ML; MG/100ML; MG/100ML; MG/100ML
INJECTION, SOLUTION INTRAVENOUS
Status: DISCONTINUED | OUTPATIENT
Start: 2020-11-05 | End: 2020-11-05 | Stop reason: HOSPADM

## 2020-11-05 RX ORDER — PROPOFOL 10 MG/ML
INJECTION, EMULSION INTRAVENOUS AS NEEDED
Status: DISCONTINUED | OUTPATIENT
Start: 2020-11-05 | End: 2020-11-05 | Stop reason: HOSPADM

## 2020-11-05 RX ORDER — SODIUM CHLORIDE 0.9 % (FLUSH) 0.9 %
5-40 SYRINGE (ML) INJECTION EVERY 8 HOURS
Status: DISCONTINUED | OUTPATIENT
Start: 2020-11-05 | End: 2020-11-06 | Stop reason: HOSPADM

## 2020-11-05 RX ORDER — BUPIVACAINE HYDROCHLORIDE 5 MG/ML
INJECTION, SOLUTION EPIDURAL; INTRACAUDAL AS NEEDED
Status: DISCONTINUED | OUTPATIENT
Start: 2020-11-05 | End: 2020-11-05 | Stop reason: HOSPADM

## 2020-11-05 RX ORDER — SODIUM CHLORIDE 0.9 % (FLUSH) 0.9 %
5-40 SYRINGE (ML) INJECTION AS NEEDED
Status: DISCONTINUED | OUTPATIENT
Start: 2020-11-05 | End: 2020-11-06 | Stop reason: HOSPADM

## 2020-11-05 RX ORDER — ROCURONIUM BROMIDE 10 MG/ML
INJECTION, SOLUTION INTRAVENOUS AS NEEDED
Status: DISCONTINUED | OUTPATIENT
Start: 2020-11-05 | End: 2020-11-05 | Stop reason: HOSPADM

## 2020-11-05 RX ORDER — FAMOTIDINE 10 MG/ML
20 INJECTION INTRAVENOUS
Status: COMPLETED | OUTPATIENT
Start: 2020-11-05 | End: 2020-11-05

## 2020-11-05 RX ORDER — HYDROMORPHONE HYDROCHLORIDE 2 MG/ML
INJECTION, SOLUTION INTRAMUSCULAR; INTRAVENOUS; SUBCUTANEOUS AS NEEDED
Status: DISCONTINUED | OUTPATIENT
Start: 2020-11-05 | End: 2020-11-05 | Stop reason: HOSPADM

## 2020-11-05 RX ORDER — SODIUM CHLORIDE, SODIUM LACTATE, POTASSIUM CHLORIDE, CALCIUM CHLORIDE 600; 310; 30; 20 MG/100ML; MG/100ML; MG/100ML; MG/100ML
125 INJECTION, SOLUTION INTRAVENOUS CONTINUOUS
Status: DISCONTINUED | OUTPATIENT
Start: 2020-11-05 | End: 2020-11-06 | Stop reason: HOSPADM

## 2020-11-05 RX ORDER — SODIUM CHLORIDE, SODIUM LACTATE, POTASSIUM CHLORIDE, CALCIUM CHLORIDE 600; 310; 30; 20 MG/100ML; MG/100ML; MG/100ML; MG/100ML
75 INJECTION, SOLUTION INTRAVENOUS CONTINUOUS
Status: DISCONTINUED | OUTPATIENT
Start: 2020-11-05 | End: 2020-11-06 | Stop reason: HOSPADM

## 2020-11-05 RX ORDER — FENTANYL CITRATE 50 UG/ML
INJECTION, SOLUTION INTRAMUSCULAR; INTRAVENOUS AS NEEDED
Status: DISCONTINUED | OUTPATIENT
Start: 2020-11-05 | End: 2020-11-05 | Stop reason: HOSPADM

## 2020-11-05 RX ORDER — FLUMAZENIL 0.1 MG/ML
0.2 INJECTION INTRAVENOUS
Status: DISCONTINUED | OUTPATIENT
Start: 2020-11-05 | End: 2020-11-06 | Stop reason: HOSPADM

## 2020-11-05 RX ORDER — NALOXONE HYDROCHLORIDE 0.4 MG/ML
0.4 INJECTION, SOLUTION INTRAMUSCULAR; INTRAVENOUS; SUBCUTANEOUS
Status: DISCONTINUED | OUTPATIENT
Start: 2020-11-05 | End: 2020-11-06 | Stop reason: HOSPADM

## 2020-11-05 RX ORDER — SODIUM CHLORIDE 9 MG/ML
25 INJECTION, SOLUTION INTRAVENOUS AS NEEDED
Status: DISCONTINUED | OUTPATIENT
Start: 2020-11-05 | End: 2020-11-06 | Stop reason: HOSPADM

## 2020-11-05 RX ORDER — GLYCOPYRROLATE 0.2 MG/ML
INJECTION INTRAMUSCULAR; INTRAVENOUS AS NEEDED
Status: DISCONTINUED | OUTPATIENT
Start: 2020-11-05 | End: 2020-11-05 | Stop reason: HOSPADM

## 2020-11-05 RX ORDER — LIDOCAINE HYDROCHLORIDE 10 MG/ML
0.1 INJECTION, SOLUTION EPIDURAL; INFILTRATION; INTRACAUDAL; PERINEURAL AS NEEDED
Status: DISCONTINUED | OUTPATIENT
Start: 2020-11-05 | End: 2020-11-06 | Stop reason: HOSPADM

## 2020-11-05 RX ORDER — MIDAZOLAM HYDROCHLORIDE 1 MG/ML
INJECTION, SOLUTION INTRAMUSCULAR; INTRAVENOUS AS NEEDED
Status: DISCONTINUED | OUTPATIENT
Start: 2020-11-05 | End: 2020-11-05 | Stop reason: HOSPADM

## 2020-11-05 RX ORDER — KETOROLAC TROMETHAMINE 30 MG/ML
15 INJECTION, SOLUTION INTRAMUSCULAR; INTRAVENOUS
Status: COMPLETED | OUTPATIENT
Start: 2020-11-05 | End: 2020-11-05

## 2020-11-05 RX ORDER — DEXAMETHASONE SODIUM PHOSPHATE 4 MG/ML
INJECTION, SOLUTION INTRA-ARTICULAR; INTRALESIONAL; INTRAMUSCULAR; INTRAVENOUS; SOFT TISSUE AS NEEDED
Status: DISCONTINUED | OUTPATIENT
Start: 2020-11-05 | End: 2020-11-05 | Stop reason: HOSPADM

## 2020-11-05 RX ORDER — KETOROLAC TROMETHAMINE 30 MG/ML
INJECTION, SOLUTION INTRAMUSCULAR; INTRAVENOUS AS NEEDED
Status: DISCONTINUED | OUTPATIENT
Start: 2020-11-05 | End: 2020-11-05 | Stop reason: HOSPADM

## 2020-11-05 RX ORDER — ONDANSETRON 2 MG/ML
INJECTION INTRAMUSCULAR; INTRAVENOUS AS NEEDED
Status: DISCONTINUED | OUTPATIENT
Start: 2020-11-05 | End: 2020-11-05 | Stop reason: HOSPADM

## 2020-11-05 RX ORDER — ONDANSETRON 2 MG/ML
4 INJECTION INTRAMUSCULAR; INTRAVENOUS
Status: COMPLETED | OUTPATIENT
Start: 2020-11-05 | End: 2020-11-05

## 2020-11-05 RX ORDER — NEOSTIGMINE METHYLSULFATE 1 MG/ML
INJECTION, SOLUTION INTRAVENOUS AS NEEDED
Status: DISCONTINUED | OUTPATIENT
Start: 2020-11-05 | End: 2020-11-05 | Stop reason: HOSPADM

## 2020-11-05 RX ADMIN — GLYCOPYRROLATE 0.6 MG: 0.2 INJECTION INTRAMUSCULAR; INTRAVENOUS at 19:43

## 2020-11-05 RX ADMIN — SODIUM CHLORIDE 3.38 G: 900 INJECTION INTRAVENOUS at 19:07

## 2020-11-05 RX ADMIN — PROPOFOL 130 MG: 10 INJECTION, EMULSION INTRAVENOUS at 19:11

## 2020-11-05 RX ADMIN — SODIUM CHLORIDE 1000 ML: 900 INJECTION, SOLUTION INTRAVENOUS at 15:27

## 2020-11-05 RX ADMIN — PIPERACILLIN AND TAZOBACTAM 3.38 G: 3; .375 INJECTION, POWDER, LYOPHILIZED, FOR SOLUTION INTRAVENOUS at 18:09

## 2020-11-05 RX ADMIN — KETOROLAC TROMETHAMINE 30 MG: 30 INJECTION INTRAMUSCULAR; INTRAVENOUS at 19:40

## 2020-11-05 RX ADMIN — ROCURONIUM BROMIDE 10 MG: 10 INJECTION INTRAVENOUS at 19:18

## 2020-11-05 RX ADMIN — ONDANSETRON 4 MG: 2 INJECTION INTRAMUSCULAR; INTRAVENOUS at 15:27

## 2020-11-05 RX ADMIN — IOPAMIDOL 100 ML: 755 INJECTION, SOLUTION INTRAVENOUS at 17:21

## 2020-11-05 RX ADMIN — Medication 3 MG: at 19:43

## 2020-11-05 RX ADMIN — HYDROMORPHONE HYDROCHLORIDE 1 MG: 2 INJECTION INTRAMUSCULAR; INTRAVENOUS; SUBCUTANEOUS at 19:29

## 2020-11-05 RX ADMIN — SUCCINYLCHOLINE CHLORIDE 100 MG: 20 INJECTION, SOLUTION INTRAMUSCULAR; INTRAVENOUS; PARENTERAL at 19:11

## 2020-11-05 RX ADMIN — ONDANSETRON HYDROCHLORIDE 4 MG: 2 SOLUTION INTRAMUSCULAR; INTRAVENOUS at 19:39

## 2020-11-05 RX ADMIN — ROCURONIUM BROMIDE 10 MG: 10 INJECTION INTRAVENOUS at 19:11

## 2020-11-05 RX ADMIN — SODIUM CHLORIDE, POTASSIUM CHLORIDE, SODIUM LACTATE AND CALCIUM CHLORIDE: 600; 310; 30; 20 INJECTION, SOLUTION INTRAVENOUS at 19:00

## 2020-11-05 RX ADMIN — FAMOTIDINE 20 MG: 10 INJECTION, SOLUTION INTRAVENOUS at 15:27

## 2020-11-05 RX ADMIN — KETOROLAC TROMETHAMINE 15 MG: 30 INJECTION, SOLUTION INTRAMUSCULAR at 15:44

## 2020-11-05 RX ADMIN — FENTANYL CITRATE 100 MCG: 0.05 INJECTION, SOLUTION INTRAMUSCULAR; INTRAVENOUS at 19:11

## 2020-11-05 RX ADMIN — DEXAMETHASONE SODIUM PHOSPHATE 4 MG: 4 INJECTION, SOLUTION INTRAMUSCULAR; INTRAVENOUS at 19:19

## 2020-11-05 RX ADMIN — MIDAZOLAM HYDROCHLORIDE 2 MG: 2 INJECTION, SOLUTION INTRAMUSCULAR; INTRAVENOUS at 19:05

## 2020-11-05 NOTE — CONSULTS
Surgery Consult    Subjective:      Jerardo Dorsey is a 23 y.o. female who presents with abdominal pain RLQ since 9am.  Did not improve so came to ED and CT showed findings of acute appendicitis. Denies fevers, chills, nausea, vomiting. Denies surgeries  Denies medical conditions      Past Medical History:   Diagnosis Date    Abnormal Pap smear of cervix 10/25/2018    LGSIL pap 10/25/2018;1/2020    HPV vaccine counseling     Pt completed Gardasil series     History reviewed. No pertinent surgical history. History reviewed. No pertinent family history. Social History     Socioeconomic History    Marital status: SINGLE     Spouse name: Not on file    Number of children: Not on file    Years of education: Not on file    Highest education level: Not on file   Tobacco Use    Smoking status: Never Smoker    Smokeless tobacco: Never Used   Substance and Sexual Activity    Alcohol use: No    Drug use: No    Sexual activity: Yes     Partners: Male     Birth control/protection: None, Injection      No current facility-administered medications for this encounter.       Current Outpatient Medications   Medication Sig    medroxyPROGESTERone (DEPO-PROVERA) 150 mg/mL injection RTO for injection    medroxyPROGESTERone (DEPO-PROVERA) 150 mg/mL syrg INJECT POR VIA INTRAMUSCULAR ONCE      No Known Allergies    Review of Systems:REVIEW OF SYSTEMS:     []     Unable to obtain  ROS due to  []    mental status change  []    sedated   []    intubated   []    Total of 12 systems reviewed as follows:    Constitutional: neg for fevers, chills, weight loss, malaise  Eyes: negative for blurry vision  Ears, nose, mouth, throat, and face: negative for sore throat  Respiratory: negative for SOB  Cardiovascular: negative for CP  Gastrointestinal: negative for nausea, vomiting, diarrhea, constipation, melena, hematochezia  Genitourinary: negative for dysuria  Integument/breast: neg for skin rash  Hematologic/lymphatic: neg for bruising  Musculoskeletal: negative for muscle aches  Neurological: no dizziness or h/a    Objective:        Patient Vitals for the past 8 hrs:   BP Temp Pulse Resp SpO2 Height Weight   20 1828   100 16 97 %     20 1630 (!) 113/46    99 %     20 1615 124/75    99 %     20 1600 126/80    100 %     20 1545 133/85    100 %     20 1535     99 %     20 1443 138/78 98 °F (36.7 °C) 95 16 97 % 5' 1\" (1.549 m) 54.4 kg (120 lb)       Temp (24hrs), Av °F (36.7 °C), Min:98 °F (36.7 °C), Max:98 °F (36.7 °C)      Physical Exam:  General:  Alert, cooperative, no distress, appears stated age. Eyes:  Conjunctivae/corneas clear. Nose: Nares normal. Septum midline   Mouth/Throat: Lips, mucosa, and tongue normal.    Neck: Supple, symmetrical, trachea midline   Lungs:   Clear to auscultation bilaterally. Heart:  Regular rate and rhythm   Abdomen:   Soft, tender RLQ, non-distended, no rebound, no guarding, normal bowel sounds   Extremities: Extremities normal, atraumatic, no cyanosis or edema. Skin: Skin color, texture, turgor normal. No rashes or lesions   Neuro: Alert, oriented, speech clear     Labs:   Recent Labs     20  1510   WBC 12.4*   HGB 14.4   HCT 43.3        Recent Labs     20  1510      K 3.7      CO2 24   GLU 92   BUN 10   CREA 0.77   CA 9.5   ALB 4.3   TBILI 0.5   ALT 32     No results for input(s): INR, INREXT in the last 72 hours. Assessment and Plan:     24 y/o female with physical and imaging findings of acute appendicitis  Explained operative and nonoperative management.   All questions answered  Would like to proceed with surgery  All risks and post op care explained      Signed By: Isabel Reyes MD     2020

## 2020-11-05 NOTE — ED PROVIDER NOTES
21-year-old female with no significant past medical history, on no medications, presents to the emergency department noting predominantly upper abdominal pain since 9 AM this morning. She states that it seemed to start when she was drinking some coffee this morning and she therefore has been unable to eat anything because of the pain. She denies any nausea, vomiting, diarrhea, constipation, blood in stool, dysuria, hematuria. She notes severe abdominal pain on arrival she rates as 10/10 in severity. She has not taken anything for her symptoms. No prior abdominal surgeries. She denies any suspicious food intake, fever, chills, recent travel, or known sick contacts. Abdominal Pain    Pertinent negatives include no fever, no diarrhea, no nausea, no vomiting, no constipation, no dysuria, no frequency, no hematuria, no headaches, no arthralgias, no myalgias, no chest pain and no back pain. Past Medical History:   Diagnosis Date    Abnormal Pap smear of cervix 10/25/2018    LGSIL pap 10/25/2018;1/2020    HPV vaccine counseling     Pt completed Gardasil series       History reviewed. No pertinent surgical history. History reviewed. No pertinent family history.     Social History     Socioeconomic History    Marital status: SINGLE     Spouse name: Not on file    Number of children: Not on file    Years of education: Not on file    Highest education level: Not on file   Occupational History    Not on file   Social Needs    Financial resource strain: Not on file    Food insecurity     Worry: Not on file     Inability: Not on file    Transportation needs     Medical: Not on file     Non-medical: Not on file   Tobacco Use    Smoking status: Never Smoker    Smokeless tobacco: Never Used   Substance and Sexual Activity    Alcohol use: No    Drug use: No    Sexual activity: Yes     Partners: Male     Birth control/protection: None, Injection   Lifestyle    Physical activity     Days per week: Not on file     Minutes per session: Not on file    Stress: Not on file   Relationships    Social connections     Talks on phone: Not on file     Gets together: Not on file     Attends [employer] service: Not on file     Active member of club or organization: Not on file     Attends meetings of clubs or organizations: Not on file     Relationship status: Not on file    Intimate partner violence     Fear of current or ex partner: Not on file     Emotionally abused: Not on file     Physically abused: Not on file     Forced sexual activity: Not on file   Other Topics Concern    Not on file   Social History Narrative    Not on file         ALLERGIES: Patient has no known allergies. Review of Systems   Constitutional: Positive for activity change and appetite change. Negative for chills and fever. HENT: Negative for congestion, rhinorrhea, sinus pressure, sneezing and sore throat. Eyes: Negative for photophobia and visual disturbance. Respiratory: Negative for cough and shortness of breath. Cardiovascular: Negative for chest pain. Gastrointestinal: Positive for abdominal pain. Negative for blood in stool, constipation, diarrhea, nausea and vomiting. Genitourinary: Negative for difficulty urinating, dysuria, flank pain, frequency, hematuria, menstrual problem, urgency, vaginal bleeding and vaginal discharge. Musculoskeletal: Negative for arthralgias, back pain, myalgias and neck pain. Skin: Negative for rash and wound. Neurological: Negative for syncope, weakness, numbness and headaches. Psychiatric/Behavioral: Negative for self-injury and suicidal ideas. All other systems reviewed and are negative. Vitals:    11/05/20 1443 11/05/20 1535   BP: 138/78    Pulse: 95    Resp: 16    Temp: 98 °F (36.7 °C)    SpO2: 97% 99%   Weight: 54.4 kg (120 lb)    Height: 5' 1\" (1.549 m)             Physical Exam  Vitals signs and nursing note reviewed.    Constitutional:       General: She is not in acute distress. Appearance: Normal appearance. She is well-developed. She is not diaphoretic. HENT:      Head: Normocephalic and atraumatic. Nose: Nose normal.   Eyes:      Extraocular Movements: Extraocular movements intact. Conjunctiva/sclera: Conjunctivae normal.      Pupils: Pupils are equal, round, and reactive to light. Neck:      Musculoskeletal: Neck supple. Cardiovascular:      Rate and Rhythm: Normal rate and regular rhythm. Heart sounds: Normal heart sounds. Pulmonary:      Effort: Pulmonary effort is normal.      Breath sounds: Normal breath sounds. Abdominal:      General: There is no distension. Palpations: Abdomen is soft. Tenderness: There is abdominal tenderness in the right lower quadrant and periumbilical area. There is guarding. There is no right CVA tenderness, left CVA tenderness or rebound. Positive signs include McBurney's sign. Negative signs include Johnson's sign and Rovsing's sign. Musculoskeletal:         General: No tenderness. Skin:     General: Skin is warm and dry. Neurological:      General: No focal deficit present. Mental Status: She is alert and oriented to person, place, and time. Cranial Nerves: No cranial nerve deficit. Sensory: No sensory deficit. Motor: No weakness. Coordination: Coordination normal.          MDM   16-year-old female presents with acute onset of abdominal pain this morning. On exam pain seems to be predominantly periumbilical and right lower quadrant with some mild guarding but no rebound or rigidity. Patient is afebrile with vital signs stable initially uncomfortable appearing but in no acute distress. Pain was treated with Toradol and she was given H2 blocker and Zofran and had significant improvement in her symptoms. UA returned showing moderate leuks with WBC 20-50 with some epithelial contamination, but negative nitrates. No urinary symptoms. hCG negative.     Labs returned showing mild leukocytosis of 12.4, otherwise reassuring. CT abdomen pelvis was discussed with Dr. Franchesca Tarango, radiology showing acute appendicitis. She was given a dose of IV Zosyn in the ED and reassessment found her to have resolved abdominal pain. General surgery was consulted, Dr. Lera Fleischer who agreed to admit the patient.       Total critical care time spent exclusive of procedures:  45 minutes    Procedures

## 2020-11-05 NOTE — ED TRIAGE NOTES
Mid abdominal pain since 9am today. Does not radiate anywhere and no other symptoms. Pt shaking in pain in triage.

## 2020-11-05 NOTE — LETTER
NOTIFICATION RETURN TO WORK / SCHOOL 
 
11/6/2020 12:41 PM 
 
Ms. Ana Duarte [address] [facility] [address] [id number] To Whom It May Concern: 
 
Ana Duarte was under the care of [facility] PACU. She will return to work/school on: 11/7/2020 Ana Duarte may return to work/school with the following restrictions: none. If there are questions or concerns please have the patient contact our office.  
 
 
 
Sincerely, 
 
 
Yung Perez RN

## 2020-11-05 NOTE — ED NOTES
TRANSFER - OUT REPORT:    Verbal report given to Jaqueline Jc RN(name) on Ca So  being transferred to PACU(unit) for routine progression of care       Report consisted of patients Situation, Background, Assessment and   Recommendations(SBAR). Information from the following report(s) SBAR, ED Summary, [facility] and Recent Results was reviewed with the receiving nurse. Lines:   Peripheral IV 11/05/20 Right Antecubital (Active)   Site Assessment Clean, dry, & intact 11/05/20 1510   Phlebitis Assessment 0 11/05/20 1510   Infiltration Assessment 0 11/05/20 1510   Dressing Status Clean, dry, & intact 11/05/20 1510   Dressing Type Tape;Transparent 11/05/20 1510   Hub Color/Line Status Pink;Flushed 11/05/20 1510   Action Taken Blood drawn 11/05/20 1510        Opportunity for questions and clarification was provided.       Patient transported with:   Mofang

## 2020-11-06 NOTE — ED NOTES
Patient called for work note and reported that she was not discharged with a work note yesterday. This RN instructed the patient that work note needs to be picked up in person. Work note is printed and in ED. Patient to call back if there are further questions.

## 2020-11-06 NOTE — DISCHARGE INSTRUCTIONS
DISCHARGE SUMMARY from your Nurse    The following personal items collected during your admission are returned to you:   Dental Appliance: Dental Appliances: None  Vision: Visual Aid: None  Hearing Aid:    Jewelry: Jewelry: None  Clothing: Clothing: None, At bedside, Footwear, Shirt, Undergarments, Socks  Other Valuables: Other Valuables: None  Valuables sent to safe:      PATIENT INSTRUCTIONS:    After general anesthesia or intravenous sedation, for 24 hours or while taking prescription Narcotics:  · Limit your activities  · Do not drive and operate hazardous machinery  · Do not make important personal or business decisions  · Do  not drink alcoholic beverages  · If you have not urinated within 8 hours after discharge, please contact your surgeon on call. Report the following to your surgeon:  · Excessive pain, swelling, redness or odor of or around the surgical area  · Temperature over 100.5  · Nausea and vomiting lasting longer than 4 hours or if unable to take medications  · Any signs of decreased circulation or nerve impairment to extremity: change in color, persistent  numbness, tingling, coldness or increase pain  · Any questions    COUGH AND DEEP BREATHE    Breathing deep and coughing are very important exercises to do after surgery. Deep breathing and coughing open the little air tubes and air sacks in your lungs. You take deep breaths every day. You may not even notice - it is just something you do when you sigh or yawn. It is a natural exercise you do to keep these air passages open. After surgery, take deep breaths and cough, on purpose. Coughing and deep breathing help prevent bronchitis and pneumonia after surgery. If you had chest or belly surgery, use a pillow as a \"hug buddy\" and hold it tightly to your chest or belly when you cough. DIRECTIONS:  · Take 10 to 15 slow deep breaths every hour while awake. · Breathe in deeply, and hold it for 2 seconds.   · Exhale slowly through puckered lips, like blowing up a balloon. · After every 4th or 5th deep breath, hug your pillow to your chest or belly and give a hard, deep cough. Yes, it will probably hurt. But doing this exercise is very important part of healing after surgery. Take your pain medicine to help you do this exercise without too much pain. IF YOU HAVE BEEN DIAGNOSED WITH SLEEP APNEA, PLEASE USE YOUR SLEEP APNEA DEVICE OR CPAP MACHINE WHEN YOU INTEND TO NAP AFTER TAKING PAIN MEDICATION. Ankle Pumps    Ankle pumps increase the circulation of oxygenated blood to your lower extremities and decrease your risk for circulation problems such as blood clots. They also stretch the muscles, tendons and ligaments in your foot and ankle, and prevent joint contracture in the ankle and foot, especially after surgeries on the legs. It is important to do ankle pump exercises regularly after surgery because immobility increases your risk for developing a blood clot. Your doctor may also have you take an Aspirin for the next few days as well. If your doctor did not ask you to take an Aspirin, consult with him before starting Aspirin therapy on your own. Slowly point your foot forward, feeling the muscles on the top of your lower leg stretch, and hold this position for 5 seconds. Next, pull your foot back toward you as far as possible, stretching the calf muscles, and hold that position for 5 seconds. Repeat with the other foot. Perform 10 repetitions every hour while awake for both ankles if possible (down and then up with the foot once is one repetition). You should feel gentle stretching of the muscles in your lower leg when doing this exercise. If you feel pain, or your range of motion is limited, don't  Push too hard. Only go the limit your joint and muscles will let you go.   If you have increasing pain, progressively worsening leg warmth or swelling, STOP the exercise and call your doctor. Below is information about the medications your doctor is prescribing after your visit:    Other information in your discharge envelope:  []     PRESCRIPTIONS  []     SCHOOL/WORK NOTE  []     INFECTION PREVENTION  []     [address]  []     REGIONAL NERVE BLOCK ON QUE PAMPHLET   []     EXPAREL  []     HANDICAP APPLICATION         These are general instructions for a healthy lifestyle:    *  Please give a list of your current medications to your Primary Care Provider. *  Please update this list whenever your medications are discontinued, doses are      changed, or new medications (including over-the-counter products) are added. *  Please carry medication information at all times in case of emergency situations. About Smoking  No smoking / No tobacco products / Avoid exposure to second hand smoke    Surgeon General's Warning:  Quitting smoking now greatly reduces serious risk to your health. Obesity, smoking, and sedentary lifestyle greatly increases your risk for illness and disease. A healthy diet, regular physical exercise & weight monitoring are important for maintaining a healthy lifestyle. Congestive Heart Failure  You may be retaining fluid if you have a history of heart failure or if you experience any of the following symptoms:  Weight gain of 3 pounds or more overnight or 5 pounds in a week, increased swelling in our hands or feet or shortness of breath while lying flat in bed. Please call your doctor as soon as you notice any of these symptoms; do not wait until your next office visit. Recognize signs and symptoms of STROKE:  F - face looks uneven  A - arms unable to move or move even  S - speech slurred or non-existent  T - time-call 911 as soon as signs and symptoms begin-DO NOT go         Back to bed or wait to see if you get better-TIME IS BRAIN. Warning signs of HEART ATTACK  Call 911 if you have these symptoms    · Chest discomfort.   Most heart attacks involve discomfort in the center of the chest that lasts more than a few minutes, or that goes away and comes back. It can feel like uncomfortable pressure, squeezing, fullness, or pain. · Discomfort in other areas of the upper body. Symptoms can include pain or discomfort in one or both        Arms, the back, neck, jaw, or stomach. ·  Shortness of breath with or without chest discomfort. · Other signs may include breaking out in a cold sweat, nausea, or lightheadedness    Don't wait more than five minutes to call 911 - MINUTES MATTER! Fast action can save your life. Calling 911 is almost always the fastest way to get lifesaving treatment. Emergency Medical Services staff can begin treatment when they arrive - up to an hour sooner than if someone gets to the hospital by car. [facility] MEDICATION AND SIDE EFFECT GUIDE    The [address] [place] [address] MEDICATION AND SIDE EFFECT GUIDE was provided to the PATIENT AND CARE PROVIDER. Information provided includes instruction about drug purpose and common side effects for the following medications:    oxycodoneDISCHARGE SUMMARY from your Nurse    The following personal items collected during your admission are returned to you:   Dental Appliance: Dental Appliances: None  Vision: Visual Aid: None  Hearing Aid:    Jewelry: Jewelry: None  Clothing: Clothing: None, At bedside, Footwear, Shirt, Undergarments, Socks  Other Valuables: Other Valuables: None  Valuables sent to safe:      PATIENT INSTRUCTIONS:    After general anesthesia or intravenous sedation, for 24 hours or while taking prescription Narcotics:  Limit your activities  Do not drive and operate hazardous machinery  Do not make important personal or business decisions  Do  not drink alcoholic beverages  If you have not urinated within 8 hours after discharge, please contact your surgeon on call.     Report the following to your surgeon:  Excessive pain, swelling, redness or odor of or around the surgical area  Temperature over 100.5  Nausea and vomiting lasting longer than 4 hours or if unable to take medications  Any signs of decreased circulation or nerve impairment to extremity: change in color, persistent  numbness, tingling, coldness or increase pain  Any questions    COUGH AND DEEP BREATHE    Breathing deep and coughing are very important exercises to do after surgery. Deep breathing and coughing open the little air tubes and air sacks in your lungs. You take deep breaths every day. You may not even notice - it is just something you do when you sigh or yawn. It is a natural exercise you do to keep these air passages open. After surgery, take deep breaths and cough, on purpose. Coughing and deep breathing help prevent bronchitis and pneumonia after surgery. If you had chest or belly surgery, use a pillow as a \"hug wilberto\" and hold it tightly to your chest or belly when you cough. DIRECTIONS:  Take 10 to 15 slow deep breaths every hour while awake. Breathe in deeply, and hold it for 2 seconds. Exhale slowly through puckered lips, like blowing up a balloon. After every 4th or 5th deep breath, hug your pillow to your chest or belly and give a hard, deep cough. Yes, it will probably hurt. But doing this exercise is very important part of healing after surgery. Take your pain medicine to help you do this exercise without too much pain. IF YOU HAVE BEEN DIAGNOSED WITH SLEEP APNEA, PLEASE USE YOUR SLEEP APNEA DEVICE OR CPAP MACHINE WHEN YOU INTEND TO NAP AFTER TAKING PAIN MEDICATION. Ankle Pumps    Ankle pumps increase the circulation of oxygenated blood to your lower extremities and decrease your risk for circulation problems such as blood clots. They also stretch the muscles, tendons and ligaments in your foot and ankle, and prevent joint contracture in the ankle and foot, especially after surgeries on the legs.     It is important to do ankle pump exercises regularly after surgery because immobility increases your risk for developing a blood clot. Your doctor may also have you take an Aspirin for the next few days as well. If your doctor did not ask you to take an Aspirin, consult with him before starting Aspirin therapy on your own. Slowly point your foot forward, feeling the muscles on the top of your lower leg stretch, and hold this position for 5 seconds. Next, pull your foot back toward you as far as possible, stretching the calf muscles, and hold that position for 5 seconds. Repeat with the other foot. Perform 10 repetitions every hour while awake for both ankles if possible (down and then up with the foot once is one repetition). You should feel gentle stretching of the muscles in your lower leg when doing this exercise. If you feel pain, or your range of motion is limited, don't  Push too hard. Only go the limit your joint and muscles will let you go. If you have increasing pain, progressively worsening leg warmth or swelling, STOP the exercise and call your doctor. Below is information about the medications your doctor is prescribing after your visit:    Other information in your discharge envelope:  []     PRESCRIPTIONS  []     SCHOOL/WORK NOTE  []     INFECTION PREVENTION  []     [address]  []     REGIONAL NERVE BLOCK ON QUE PAMPHLET   []     EXPAREL  []     HANDICAP APPLICATION         These are general instructions for a healthy lifestyle:    *  Please give a list of your current medications to your Primary Care Provider. *  Please update this list whenever your medications are discontinued, doses are      changed, or new medications (including over-the-counter products) are added. *  Please carry medication information at all times in case of emergency situations.     About Smoking  No smoking / No tobacco products / Avoid exposure to second hand smoke    Surgeon General's Warning:  Quitting smoking now greatly reduces serious risk to your health. Obesity, smoking, and sedentary lifestyle greatly increases your risk for illness and disease. A healthy diet, regular physical exercise & weight monitoring are important for maintaining a healthy lifestyle. Congestive Heart Failure  You may be retaining fluid if you have a history of heart failure or if you experience any of the following symptoms:  Weight gain of 3 pounds or more overnight or 5 pounds in a week, increased swelling in our hands or feet or shortness of breath while lying flat in bed. Please call your doctor as soon as you notice any of these symptoms; do not wait until your next office visit. Recognize signs and symptoms of STROKE:  F - face looks uneven  A - arms unable to move or move even  S - speech slurred or non-existent  T - time-call 911 as soon as signs and symptoms begin-DO NOT go         Back to bed or wait to see if you get better-TIME IS BRAIN. Warning signs of HEART ATTACK  Call 911 if you have these symptoms    Chest discomfort. Most heart attacks involve discomfort in the center of the chest that lasts more than a few minutes, or that goes away and comes back. It can feel like uncomfortable pressure, squeezing, fullness, or pain. Discomfort in other areas of the upper body. Symptoms can include pain or discomfort in one or both        Arms, the back, neck, jaw, or stomach. Shortness of breath with or without chest discomfort. Other signs may include breaking out in a cold sweat, nausea, or lightheadedness    Don't wait more than five minutes to call 911 - MINUTES MATTER! Fast action can save your life. Calling 911 is almost always the fastest way to get lifesaving treatment. Emergency Medical Services staff can begin treatment when they arrive - up to an hour sooner than if someone gets to the hospital by car.       [facility] MEDICATION AND SIDE EFFECT GUIDE    The [facility] MEDICATION AND SIDE EFFECT GUIDE was provided to the PATIENT AND CARE PROVIDER. Information provided includes instruction about drug purpose and common side effects for the following medications    Oxycodone    POST-OPERATIVE INSTRUCTIONS  OUTPATIENT SURGERY    Today you underwent a laparoscopic appendectomy which is usually well tolerated. However, below is a list of instructions which are important for you to follow. If you have any questions, please call your surgeons office. 1 EATING: Please eat lightly when you go home today for the first 24 hours. You may resume your regular diet after that. 2. EXERCISE: Limit your exercise for the first week, although stairs or other walking is fine. No strenuous activity (No lifting >10-15lbs) for one month. 3. DRESSING: You may shower in 1 day, unless otherwise instructed by your surgeon. No pools, baths, or hot tubs for 2 weeks. 4. NO LIFTING: No lifting >10lbs for 4 weeks from day of surgery    5. DRIVING: No driving while taking prescription pain medicine, and until post-operative discomfort resolves. Usually you may begin driving within 1-2 weeks. 6. PAIN: A prescription for pain has been given to you or your family. Please use it as prescribed and if this is inadequate, please call your surgeons office. In some cases, Tylenol or Advil may be adequate; and in that case, you will not need to fill the prescription. Please call for any refills during office hours. Pain medication may cause constipation - Colace or Milk of Magnesia may be used as needed. May take Tylenol 1000mg every 6 hours as needed for pain  May take Ibuprofen 600-800mg every 6 hours as needed for pain      7. WOUND: If you notice any increased redness, swelling, pain or fever, please call the office. If this is noticed at a time after normal office hours, please call our answering service. 8. APPOINTMENT: Your surgeon would like to see you in his/her office in 14 days.     If this appointment has not been made for you prior to your departure from Outpatient Surgery, please call your surgeons office to schedule your appointment. If you have any questions or concerns, please do not hesitate to call your surgeon or any other staff member who will be able to assist you.       [facility]  [address]   [address]  [place], [address]  [phone]

## 2020-11-06 NOTE — ANESTHESIA POSTPROCEDURE EVALUATION
Procedure(s):  APPENDECTOMY LAPAROSCOPIC. general    Anesthesia Post Evaluation      Multimodal analgesia: multimodal analgesia not used between 6 hours prior to anesthesia start to PACU discharge  Patient location during evaluation: PACU  Patient participation: complete - patient participated  Level of consciousness: awake and alert  Pain score: 2  Pain management: satisfactory to patient  Airway patency: patent  Anesthetic complications: no  Cardiovascular status: acceptable  Respiratory status: acceptable  Hydration status: acceptable  Post anesthesia nausea and vomiting:  none  Final Post Anesthesia Temperature Assessment:  Normothermia (36.0-37.5 degrees C)      INITIAL Post-op Vital signs:   Vitals Value Taken Time   /56 11/5/2020  8:50 PM   Temp 36.8 °C (98.3 °F) 11/5/2020  8:40 PM   Pulse 66 11/5/2020  8:52 PM   Resp 13 11/5/2020  8:52 PM   SpO2 100 % 11/5/2020  8:52 PM   Vitals shown include unvalidated device data.

## 2020-11-06 NOTE — OP NOTES
Patient: Benjie Chaves MRN: [id number]  SSN: [id number]    YOB: 1995  Age: 23 y.o. Sex: female        OPERATIVE REPORT - LAPAROSCOPIC APPENDECTOMY    PREOPERATIVE DIAGNOSIS: APPENDICITIS    POSTOPERATIVE DIAGNOSIS: APPENDICITIS    OPERATIVE PROCEDURE: Laparoscopic Appendectomy    SURGEON: Aleshia Aguilar MD    ANESTHESIA: General.    ANESTHESIOLOGIST: General    COMPLICATIONS: None    ESTIMATED BLOOD LOSS: Minimal.    INDICATION: Documented in the history and physical.    FINDINGS: inflamed appendix    PROCEDURE: Under general anesthesia and with the patient supine on the  operating table, the abdomen was cleaned, prepped, and draped. The  laparoscopic camera and CO2 insufflation apparatus were affixed to the  drapes in the usual fashion. A supraumbilical incision was made. Fascia was grasped and elevated with a kocker clamp. Veress needle was introduced intraperitoneal pressure were low. Pneumoperitoneum was created and maintained at 15 mmHg. The Veress needle  was removed, a 12-mm trocar introduced and laparoscopic camera through  this. A laparoscopy was performed and it was confirmed that there was no  intraabdominal injury during introduction of pneumoperitoneum and the  trocar. Under direct vision, two 5-mm ports were positioned, one in the suprapubic region and another one between suprapubic and umbilical port. Patient was positioned in tilt to the left. The appendix was identified in the right lower quadrant. The meso appendix was grasped and lifted upwards to clearly identify the base and the junction with the cecum. The meso appendix was divided using the  [employer] stapling device with a white load. The base of the appendix was then transected using the [employer] stapling device with a blue load. The appendix was then retrieved through the umbilical port using an endo pouch. Reexamination of the right lower quadrant revealed no bleeding.     The umbilical port fascia was closed with 0-Vicry stitch in a figure of 8 fashion using Stortz closure device  The pneumoperitoneum was released and the ports were removed under vision confirming no bleeding or injury to intraabdominal structures. All wounds were closed with subcuticular 4.0-Vicryl. Area cleaned and dermabond was used as dressing    Patient tolerated procedure well and returned to the Recovery Room in stable condition. SPECIMEN: Appendix. COUNTS: Correct at the completion of surgery.     Leonidas Medellin MD

## 2020-11-06 NOTE — BRIEF OP NOTE
Brief Postoperative Note    Patient: Eleni Sheth  YOB: 1995  MRN: [id number]    Date of Procedure: 11/5/2020     Pre-Op Diagnosis: APPENDICITIS    Post-Op Diagnosis: Same as preoperative diagnosis.       Procedure(s):  APPENDECTOMY LAPAROSCOPIC    Surgeon(s):  Deonna Rangel MD    Surgical Assistant: Surg Asst-1: Martha Sampson RN    Anesthesia: General     Estimated Blood Loss (mL): Minimal    Complications: None    Specimens:   ID Type Source Tests Collected by Time Destination   1 : APPENDIX Preservative Appendix  Deonna Rangel MD 11/5/2020 1936 Pathology        Implants:none  Drains: none    Findings: inflammed appendix    Electronically Signed by Timo Smart MD on 11/5/2020 at 7:40 PM

## 2020-11-06 NOTE — ANESTHESIA PREPROCEDURE EVALUATION
Relevant Problems   No relevant active problems       Anesthetic History   No history of anesthetic complications            Review of Systems / Medical History  Patient summary reviewed and pertinent labs reviewed    Pulmonary  Within defined limits                 Neuro/Psych   Within defined limits           Cardiovascular                  Exercise tolerance: >4 METS     GI/Hepatic/Renal               Comments: ? Acute appendicitis Endo/Other  Within defined limits           Other Findings              Physical Exam    Airway  Mallampati: I  TM Distance: 4 - 6 cm  Neck ROM: normal range of motion   Mouth opening: Normal     Cardiovascular    Rhythm: regular  Rate: normal         Dental  No notable dental hx       Pulmonary  Breath sounds clear to auscultation               Abdominal  GI exam deferred       Other Findings            Anesthetic Plan    ASA: 1  Anesthesia type: general          Induction: Intravenous  Anesthetic plan and risks discussed with: Patient

## 2021-01-19 ENCOUNTER — CLINICAL SUPPORT (OUTPATIENT)
Dept: OBGYN CLINIC | Age: 26
End: 2021-01-19
Payer: MEDICAID

## 2021-01-19 DIAGNOSIS — N91.2 AMENORRHEA DUE TO DEPO PROVERA: Primary | ICD-10-CM

## 2021-01-19 PROCEDURE — 96372 THER/PROPH/DIAG INJ SC/IM: CPT | Performed by: OBSTETRICS & GYNECOLOGY

## 2021-01-19 RX ORDER — MEDROXYPROGESTERONE ACETATE 150 MG/ML
150 INJECTION, SUSPENSION INTRAMUSCULAR ONCE
Status: COMPLETED | OUTPATIENT
Start: 2021-01-19 | End: 2021-01-19

## 2021-01-19 RX ADMIN — MEDROXYPROGESTERONE ACETATE 150 MG: 150 INJECTION, SUSPENSION INTRAMUSCULAR at 15:09

## 2021-01-19 NOTE — PROGRESS NOTES
Date last pap: 1/21/2020  Last Depo-Provera: 10/20/2020  Side Effects if any: none  Serum HCG indicated? N/A  Depo-Provera 150 mg IM given by: Ronak Martines LPN  Next appointment due 4/6/2021-4/20/2021    Administered 150mg/mL Depo-Provera per MD order. Verbal consent given by patient. Injection given IM in the right deltoid . Patient tolerated well, no complications, no side effects. Lot # [id number]  Exp: 1/2023    Advised patient dates for next depo injection.  Patient verbalized understanding

## 2021-02-11 ENCOUNTER — OFFICE VISIT (OUTPATIENT)
Dept: OBGYN CLINIC | Age: 26
End: 2021-02-11
Payer: MEDICAID

## 2021-02-11 VITALS
SYSTOLIC BLOOD PRESSURE: 116 MMHG | BODY MASS INDEX: 22.66 KG/M2 | WEIGHT: 120 LBS | DIASTOLIC BLOOD PRESSURE: 62 MMHG | HEIGHT: 61 IN

## 2021-02-11 DIAGNOSIS — R10.2 PELVIC PAIN: Primary | ICD-10-CM

## 2021-02-11 DIAGNOSIS — N93.9 ABNORMAL UTERINE BLEEDING: ICD-10-CM

## 2021-02-11 PROCEDURE — 99213 OFFICE O/P EST LOW 20 MIN: CPT | Performed by: OBSTETRICS & GYNECOLOGY

## 2021-02-11 PROCEDURE — 76830 TRANSVAGINAL US NON-OB: CPT | Performed by: OBSTETRICS & GYNECOLOGY

## 2021-02-11 NOTE — PROGRESS NOTES
Pelvic Pain evaluation    Romero Tsang is a 27 y.o. female who complains of pelvic pain. The pain is described as aching and crampinGg  Pain is located in the LLQ without radiation. Feels at night    The pain started 1-2 weeks ago. Her symptoms have been unchanged since. Aggravating factors: none. Alleviating factors: none. Associated symptoms: vaginal bleeding. Patient is currently on Depo injection, she typically has no bleeding. She states bleeding started 1 week ago, heavy with clots. The patient denies fever, chills, dysuria, vaginal itching/odor. .    Past Medical History:   Diagnosis Date    Abnormal Pap smear of cervix 10/25/2018    LGSIL pap 10/25/2018;1/2020    HPV vaccine counseling     Pt completed Gardasil series     No past surgical history on file. Social History     Occupational History    Not on file   Tobacco Use    Smoking status: Never Smoker    Smokeless tobacco: Never Used   Substance and Sexual Activity    Alcohol use: No    Drug use: No    Sexual activity: Yes     Partners: Male     Birth control/protection: None, Injection     No family history on file. No Known Allergies  Prior to Admission medications    Medication Sig Start Date End Date Taking?  Authorizing Provider   medroxyPROGESTERone (DEPO-PROVERA) 150 mg/mL injection RTO for injection 1/21/20  Yes Gaye Bradford MD        Review of Systems: History obtained from the patient  Constitutional: negative for weight loss, fever, night sweats  Breast: negative for breast lumps, nipple discharge, galactorrhea  GI: negative for change in bowel habits, abdominal pain, black or bloody stools  : negative for frequency, dysuria, hematuria, vaginal discharge  MSK: negative for back pain, joint pain, muscle pain  Skin: negative for itching, rash, hives  Psych: negative for anxiety, depression, change in mood      Objective:    Visit Vitals  /62   Ht 5' 1\" (1.549 m)   Wt 120 lb (54.4 kg)   BMI 22.67 kg/m² Physical Exam:     Constitutional  · Appearance: well-nourished, well developed, alert, in no acute distress    Gastrointestinal  · Abdominal Examination: abdomen non-tender to palpation, normal bowel sounds, no masses present  · Liver and spleen: no hepatomegaly present, spleen not palpable  · Hernias: no hernias identified    Genitourinary  · External Genitalia: normal appearance for age, no discharge present, no tenderness present, no inflammatory lesions present, no masses present, no atrophy present  · Vagina: normal vaginal vault without central or paravaginal defects, no discharge present, no inflammatory lesions present, no masses present  · Bladder: non-tender to palpation  · Urethra: appears normal  · Cervix: normal   · Uterus: normal size, shape and consistency  · Adnexa: no adnexal tenderness present, no adnexal masses present  · Perineum: perineum within normal limits, no evidence of trauma, no rashes or skin lesions present  · Anus: anus within normal limits, no hemorrhoids present  · Inguinal Lymph Nodes: no lymphadenopathy present    Skin  · General Inspection: no rash, no lesions identified    Neurologic/Psychiatric  · Mental Status:  · Orientation: grossly oriented to person, place and time  · Mood and Affect: mood normal, affect appropriate    Assessment:  Pelvic pain - only at night  Normal ultrasound    Plan:   Pt advised any unusual bleeding is normal on DP  [facility]  Fu PCP if pain continues      RTO prn if symptoms persist or worsen. Instructions given to pt. Handouts given to pt.

## 2021-02-14 LAB
C TRACH RRNA SPEC QL NAA+PROBE: NEGATIVE
N GONORRHOEA RRNA SPEC QL NAA+PROBE: NEGATIVE
SPECIMEN STATUS REPORT, ROLRST: NORMAL
T VAGINALIS DNA SPEC QL NAA+PROBE: NEGATIVE

## 2021-04-19 PROBLEM — O60.03 PRETERM LABOR IN THIRD TRIMESTER WITHOUT DELIVERY: Status: RESOLVED | Noted: 2017-01-24 | Resolved: 2021-04-19

## 2021-04-19 PROBLEM — Z34.90 PREGNANCY: Status: RESOLVED | Noted: 2017-02-01 | Resolved: 2021-04-19

## 2021-04-19 RX ORDER — MEDROXYPROGESTERONE ACETATE 150 MG/ML
INJECTION, SUSPENSION INTRAMUSCULAR
Qty: 1 ML | Refills: 0 | Status: SHIPPED | OUTPATIENT
Start: 2021-04-19 | End: 2021-04-20 | Stop reason: SDUPTHER

## 2021-04-20 ENCOUNTER — CLINICAL SUPPORT (OUTPATIENT)
Dept: OBGYN CLINIC | Age: 26
End: 2021-04-20
Payer: MEDICAID

## 2021-04-20 VITALS — SYSTOLIC BLOOD PRESSURE: 112 MMHG | DIASTOLIC BLOOD PRESSURE: 84 MMHG | WEIGHT: 122 LBS | BODY MASS INDEX: 23.05 KG/M2

## 2021-04-20 DIAGNOSIS — Z30.42 ENCOUNTER FOR DEPO-PROVERA CONTRACEPTION: Primary | ICD-10-CM

## 2021-04-20 PROCEDURE — 96372 THER/PROPH/DIAG INJ SC/IM: CPT | Performed by: OBSTETRICS & GYNECOLOGY

## 2021-04-20 RX ORDER — MEDROXYPROGESTERONE ACETATE 150 MG/ML
150 INJECTION, SUSPENSION INTRAMUSCULAR ONCE
Status: COMPLETED | OUTPATIENT
Start: 2021-04-20 | End: 2021-04-20

## 2021-04-20 RX ORDER — MEDROXYPROGESTERONE ACETATE 150 MG/ML
INJECTION, SUSPENSION INTRAMUSCULAR
Qty: 1 ML | Refills: 0 | Status: SHIPPED | OUTPATIENT
Start: 2021-04-20 | End: 2021-05-07 | Stop reason: SDUPTHER

## 2021-04-20 RX ADMIN — MEDROXYPROGESTERONE ACETATE 150 MG: 150 INJECTION, SUSPENSION INTRAMUSCULAR at 13:43

## 2021-04-20 NOTE — PROGRESS NOTES
Subjective:      Kirsten Diane is here for her depoprovera injection. Patient wishes to continue depoprovera treatment for contraception. Side effects of treatment to date: none. Standing order is on patient's medication list.    Last Depoprovera injection date: 1/19/2021  Last Pap smear date: 1/21/2020    Objective:     Visit Vitals  /84   Wt 122 lb (55.3 kg)   BMI 23.05 kg/m²       Assessment/Plan:     Stable, doing well on Depoprovera, appropriate to continue. Depoprovera 150 mg IM given. She tolerated the injection well, see Immunization activity for details. Cruz      lalo advised to return for next depo 7/6/2021-7/20/2021. Voiced understanding.

## 2021-05-05 NOTE — PROGRESS NOTES
Aniyah Segura is a ,  27 y.o. female [address] whose No LMP recorded. Patient has had an injection. who presents for her annual checkup. She is having no significant problems. With regard to the Gardisil vaccine, she has received all 3 injections. Menstrual status:    Her periods are minimal to nonexistent in flow. She is using essentially none pads or tampons per day, minimal to none using Depoprovera. She denies dysmenorrhea. She reports no premenstrual symptoms. Contraception:    The current method of family planning is Depo-Provera injections and She declines contraception and counseling. Sexual history:    She  reports being sexually active and has had partner(s) who are Male. She reports using the following methods of birth control/protection: None and Injection. Medical conditions:    Since her last annual GYN exam about one year ago, she has not the following changes in her health history: none. Pap and Mammogram History:    Her most recent Pap smear was 2020 LSIL     The patient has never had a mammogram.    The patient does not have a family history of breast cancer. Past Medical History:   Diagnosis Date    Abnormal Pap smear of cervix 10/25/2018    LGSIL pap 10/25/2018;2020    HPV vaccine counseling     Pt completed Gardasil series     No past surgical history on file. Current Outpatient Medications   Medication Sig Dispense Refill    medroxyPROGESTERone (DEPO-PROVERA) 150 mg/mL injection RTO for injection 1 mL 0     Allergies: Patient has no known allergies.    Social History     Socioeconomic History    Marital status: SINGLE     Spouse name: Not on file    Number of children: Not on file    Years of education: Not on file    Highest education level: Not on file   Occupational History    Not on file   Social Needs    Financial resource strain: Not on file    Food insecurity     Worry: Not on file     Inability: Not on file  Transportation needs     Medical: Not on file     Non-medical: Not on file   Tobacco Use    Smoking status: Never Smoker    Smokeless tobacco: Never Used   Substance and Sexual Activity    Alcohol use: No    Drug use: No    Sexual activity: Yes     Partners: Male     Birth control/protection: None, Injection   Lifestyle    Physical activity     Days per week: Not on file     Minutes per session: Not on file    Stress: Not on file   Relationships    Social connections     Talks on phone: Not on file     Gets together: Not on file     Attends Gnosticist service: Not on file     Active member of club or organization: Not on file     Attends meetings of clubs or organizations: Not on file     Relationship status: Not on file    Intimate partner violence     Fear of current or ex partner: Not on file     Emotionally abused: Not on file     Physically abused: Not on file     Forced sexual activity: Not on file   Other Topics Concern    Not on file   Social History Narrative    Not on file     Tobacco History:  reports that she has never smoked. She has never used smokeless tobacco.  Alcohol Abuse:  reports no history of alcohol use. Drug Abuse:  reports no history of drug use.     Patient Active Problem List   Diagnosis Code   (none) - all problems resolved or deleted       Review of Systems - History obtained from the patient  Constitutional: negative for weight loss, fever, night sweats  HEENT: negative for hearing loss, earache, congestion, snoring, sorethroat  CV: negative for chest pain, palpitations, edema  Resp: negative for cough, shortness of breath, wheezing  GI: negative for change in bowel habits, abdominal pain, black or bloody stools  : negative for frequency, dysuria, hematuria, vaginal discharge  MSK: negative for back pain, joint pain, muscle pain  Breast: negative for breast lumps, nipple discharge, galactorrhea  Skin :negative for itching, rash, hives  Neuro: negative for dizziness, headache, confusion, weakness  Psych: negative for anxiety, depression, change in mood  Heme/lymph: negative for bleeding, bruising, pallor    Physical Exam    Visit Vitals  /74   Wt 127 lb 6.4 oz (57.8 kg)   BMI 24.07 kg/m²       Constitutional  · Appearance: well-nourished, well developed, alert, in no acute distress    HENT  · Head and Face: appears normal    Neck  · Inspection/Palpation: normal appearance, no masses or tenderness  · Lymph Nodes: no lymphadenopathy present  · Thyroid: gland size normal, nontender, no nodules or masses present on palpation    Chest  · Respiratory Effort: breathing normal  · Auscultation: normal breath sounds    Cardiovascular  · Heart:  · Auscultation: regular rate and rhythm without murmur    Breasts  · Inspection of Breasts: breasts symmetrical, no skin changes, no discharge present, nipple appearance normal, no skin retraction present  · Palpation of Breasts and Axillae: no masses present on palpation, no breast tenderness  · Axillary Lymph Nodes: no lymphadenopathy present    Gastrointestinal  · Abdominal Examination: abdomen non-tender to palpation, normal bowel sounds, no masses present  · Liver and spleen: no hepatomegaly present, spleen not palpable  · Hernias: no hernias identified    Genitourinary  · External Genitalia: normal appearance for age, no discharge present, no tenderness present, no inflammatory lesions present, no masses present, no atrophy present  · Vagina: normal vaginal vault without central or paravaginal defects, no discharge present, no inflammatory lesions present, no masses present  · Bladder: non-tender to palpation  · Urethra: appears normal  · Cervix: normal   · Uterus: normal size, shape and consistency  · Adnexa: no adnexal tenderness present, no adnexal masses present  · Perineum: perineum within normal limits, no evidence of trauma, no rashes or skin lesions present  · Anus: anus within normal limits, no hemorrhoids present  · Inguinal Lymph Nodes: no lymphadenopathy present    Skin  · General Inspection: no rash, no lesions identified    Neurologic/Psychiatric  · Mental Status:  · Orientation: grossly oriented to person, place and time  · Mood and Affect: mood normal, affect appropriate    . Assessment:  Routine gynecologic examination  Her current medical status is satisfactory with no evidence of significant gynecologic issues.   Hx of LGSIL pap  Plan:  Counseled re: diet, exercise, healthy lifestyle  Return for yearly wellness visits  LGSIL  Cont DP

## 2021-05-07 ENCOUNTER — OFFICE VISIT (OUTPATIENT)
Dept: OBGYN CLINIC | Age: 26
End: 2021-05-07
Payer: MEDICAID

## 2021-05-07 VITALS — BODY MASS INDEX: 24.07 KG/M2 | SYSTOLIC BLOOD PRESSURE: 108 MMHG | DIASTOLIC BLOOD PRESSURE: 74 MMHG | WEIGHT: 127.4 LBS

## 2021-05-07 DIAGNOSIS — Z11.3 SCREENING EXAMINATION FOR VENEREAL DISEASE: ICD-10-CM

## 2021-05-07 DIAGNOSIS — Z01.419 ENCNTR FOR GYN EXAM (GENERAL) (ROUTINE) W/O ABN FINDINGS: Primary | ICD-10-CM

## 2021-05-07 DIAGNOSIS — N94.89 SUPPRESSION OF MENSES: ICD-10-CM

## 2021-05-07 PROCEDURE — 99395 PREV VISIT EST AGE 18-39: CPT | Performed by: OBSTETRICS & GYNECOLOGY

## 2021-05-07 RX ORDER — MEDROXYPROGESTERONE ACETATE 150 MG/ML
INJECTION, SUSPENSION INTRAMUSCULAR
Qty: 1 ML | Refills: 4 | Status: SHIPPED | OUTPATIENT
Start: 2021-05-07 | End: 2022-07-27

## 2021-05-07 NOTE — PATIENT INSTRUCTIONS
Well Visit, Ages 25 to 48: Care Instructions  Overview     Well visits can help you stay healthy. Your doctor has checked your overall health and may have suggested ways to take good care of yourself. Your doctor also may have recommended tests. At home, you can help prevent illness with healthy eating, regular exercise, and other steps. Follow-up care is a key part of your treatment and safety. Be sure to make and go to all appointments, and call your doctor if you are having problems. It's also a good idea to know your test results and keep a list of the medicines you take. How can you care for yourself at home? · Get screening tests that you and your doctor decide on. Screening helps find diseases before any symptoms appear. · Eat healthy foods. Choose fruits, vegetables, whole grains, protein, and low-fat dairy foods. Limit fat, especially saturated fat. Reduce salt in your diet. · Limit alcohol. If you are a man, have no more than 2 drinks a day or 14 drinks a week. If you are a woman, have no more than 1 drink a day or 7 drinks a week. · Get at least 30 minutes of physical activity on most days of the week. Walking is a good choice. You also may want to do other activities, such as running, swimming, cycling, or playing tennis or team sports. Discuss any changes in your exercise program with your doctor. · Reach and stay at a healthy weight. This will lower your risk for many problems, such as obesity, diabetes, heart disease, and high blood pressure. · Do not smoke or allow others to smoke around you. If you need help quitting, talk to your doctor about stop-smoking programs and medicines. These can increase your chances of quitting for good. · Care for your mental health. It is easy to get weighed down by worry and stress. Learn strategies to manage stress, like deep breathing and mindfulness, and stay connected with your family and community.  If you find you often feel sad or hopeless, talk with your doctor. Treatment can help. · Talk to your doctor about whether you have any risk factors for sexually transmitted infections (STIs). You can help prevent STIs if you wait to have sex with a new partner (or partners) until you've each been tested for STIs. It also helps if you use condoms (male or female condoms) and if you limit your sex partners to one person who only has sex with you. Vaccines are available for some STIs, such as HPV. · Use birth control if it's important to you to prevent pregnancy. Talk with your doctor about the choices available and what might be best for you. · If you think you may have a problem with alcohol or drug use, talk to your doctor. This includes prescription medicines (such as amphetamines and opioids) and illegal drugs (such as cocaine and methamphetamine). Your doctor can help you figure out what type of treatment is best for you. · Protect your skin from too much sun. When you're outdoors from 10 a.m. to 4 p.m., stay in the shade or cover up with clothing and a hat with a wide brim. Wear sunglasses that block UV rays. Even when it's cloudy, put broad-spectrum sunscreen (SPF 30 or higher) on any exposed skin. · See a dentist one or two times a year for checkups and to have your teeth cleaned. · Wear a seat belt in the car. When should you call for help? Watch closely for changes in your health, and be sure to contact your doctor if you have any problems or symptoms that concern you. Where can you learn more? Go to http://www.[employer].com/  Enter P072 in the search box to learn more about \"Well Visit, Ages 25 to 48: Care Instructions. \"  Current as of: May 27, 2020               Content Version: 12.8  © 9267-0923 Healthwise, Incorporated. Care instructions adapted under license by [employer] (which disclaims liability or warranty for this information).  If you have questions about a medical condition or this instruction, always ask your healthcare professional. [place] [address] any warranty or liability for your use of this information.

## 2021-05-12 LAB
C TRACH RRNA CVX QL NAA+PROBE: NEGATIVE
CYTOLOGIST CVX/VAG CYTO: ABNORMAL
CYTOLOGY CVX/VAG DOC CYTO: ABNORMAL
CYTOLOGY CVX/VAG DOC THIN PREP: ABNORMAL
CYTOLOGY HISTORY:: ABNORMAL
DX ICD CODE: ABNORMAL
DX ICD CODE: ABNORMAL
LABCORP, 190119: ABNORMAL
Lab: ABNORMAL
N GONORRHOEA RRNA CVX QL NAA+PROBE: NEGATIVE
OTHER STN SPEC: ABNORMAL
PATHOLOGIST CVX/VAG CYTO: ABNORMAL
STAT OF ADQ CVX/VAG CYTO-IMP: ABNORMAL
T VAGINALIS RRNA SPEC QL NAA+PROBE: NEGATIVE

## 2021-05-13 NOTE — PROGRESS NOTES
Viewed by Grover Watson on 5/12/2021  4:24 PM  Spoke with patient via [employer] [facility] regarding results and appt, pt will mychart me today to let me know when she is available to come in for biopsies

## 2021-05-25 ENCOUNTER — HOSPITAL ENCOUNTER (OUTPATIENT)
Dept: LAB | Age: 26
Discharge: HOME | End: 2021-05-25

## 2021-05-25 ENCOUNTER — OFFICE VISIT (OUTPATIENT)
Dept: OBGYN CLINIC | Age: 26
End: 2021-05-25
Payer: MEDICAID

## 2021-05-25 VITALS — WEIGHT: 128 LBS | SYSTOLIC BLOOD PRESSURE: 108 MMHG | DIASTOLIC BLOOD PRESSURE: 74 MMHG | BODY MASS INDEX: 24.19 KG/M2

## 2021-05-25 DIAGNOSIS — R87.619 ABNORMAL CERVICAL PAPANICOLAOU SMEAR, UNSPECIFIED ABNORMAL PAP FINDING: Primary | ICD-10-CM

## 2021-05-25 PROCEDURE — 57454 BX/CURETT OF CERVIX W/SCOPE: CPT | Performed by: OBSTETRICS & GYNECOLOGY

## 2021-05-25 NOTE — PATIENT INSTRUCTIONS
Colposcopy: What to Expect at [facility] Your Recovery You may feel some soreness in your vagina for a day or two if you had a biopsy. Some vaginal bleeding or discharge is normal for up to a week after a biopsy. The discharge may be dark-colored if a solution was put on your cervix. You can use a sanitary pad for the bleeding. It may take a week or two for you to get the test results. This care sheet gives you a general idea about how long it will take for you to recover. But each person recovers at a different pace. Follow the steps below to feel better as quickly as possible. How can you care for yourself at home? Activity 
  · You can return to work and most daily activities right after the test.  
Exercise 
  · Do not exercise for 1 day after the test.  
Medicines 
  · Your doctor will tell you if and when you can restart your medicines. You will also get instructions about taking any new medicines.  
  · If you take aspirin or some other blood thinner, ask your doctor if and when to start taking it again. Make sure that you understand exactly what your doctor wants you to do.  
  · Take an over-the-counter pain medicine, such as acetaminophen (Tylenol), ibuprofen (Advil, Motrin), or naproxen (Aleve). Be safe with medicines. Read and follow all instructions on the label. Do not take two or more pain medicines at the same time unless the doctor told you to. Many pain medicines have acetaminophen, which is Tylenol. Too much acetaminophen (Tylenol) can be harmful. Other instructions 
  · Some vaginal bleeding or discharge is normal for up to a week after a biopsy. The discharge may be dark-colored. Use a pad if you have some bleeding.  
  · Do not douche, have sexual intercourse, or use tampons for 1 week if you had a biopsy. This will allow time for your cervix to heal.  
  · You can take a bath or shower anytime after the test.  
Follow-up care is a key part of your treatment and safety.  Be sure to make and go to all appointments, and call your doctor if you are having problems. It's also a good idea to know your test results and keep a list of the medicines you take. When should you call for help? Call your doctor now or seek immediate medical care if: 
  · You have severe vaginal bleeding. This means that you are soaking through your usual pads or tampons each hour for 2 or more hours.  
  · You have pain that does not get better after you take pain medicine.  
  · You have signs of infection, such as: 
? Increased pain. ? Bad-smelling vaginal discharge. ? A fever. Watch closely for any changes in your health, and be sure to contact your doctor if: 
  · You have questions or concerns. Where can you learn more? Go to http://www.[employer].com/ Enter M523 in the search box to learn more about \"Colposcopy: What to Expect at Home. \" Current as of: December 17, 2020               Content Version: 12.8 © 2006-2021 Healthwise, Incorporated. Care instructions adapted under license by [employer] (which disclaims liability or warranty for this information). If you have questions about a medical condition or this instruction, always ask your healthcare professional. [address] any warranty or liability for your use of this information.

## 2021-05-25 NOTE — PROGRESS NOTES
[facility]  OFFICE PROCEDURE PROGRESS NOTE        Chart reviewed for the following:   Kait URRUTIA, have reviewed the History, Physical and updated the Allergic reactions for [address] performed immediately prior to start of procedure:   RENAN Darwinrama Baxter, have performed the following reviews on Weston Slider prior to the start of the procedure:            * Patient was identified by name and date of birth   * Agreement on procedure being performed was verified  * Risks and Benefits explained to the patient  * Procedure site verified and marked as necessary  * Patient was positioned for comfort  * Consent was signed and verified     Time: 4:04 PM        Date of procedure: 2021    Procedure performed by:  John Haro MD    How tolerated by patient: tolerated the procedure well with no complications    Post Procedural Pain Scale: 2 - Hurts Little Bit    Comments: none    Procedure Note: Colposcopy    Weston Slider is a ,  27 y.o. female / No LMP recorded. Patient has had an injection. She presents for colposcopy for evaluation of a cervical abnormality with a pap smear abnormality consisting of LSIL. After being presented with the risks, benefits and alternatives has she signed a consent for the procedure. She states that she understands the need for the procedure and has no further questions. She was informed that she may experience discomfort. Procedure:  She was positioned in the dorsal lithotomy position and a speculum was inserted into the vagina. Dilute acetic acid was applied to the cervix. The colposcope was used to visualize the cervix. Procedure: The transformation zone was completely visualized. Findings: This colposcopy was satisfactory. The procedure was notable for white epithelium on the cervix. Biopsies were taken from the cervix . See accompanying diagram for biopsy sites.  Monsels was applied to the cervix. Endocervical currettage: An endocervical curettage was performed. Post Procedure Status: The patient tolerated the procedure well with minimal discomfort. She was observed for 10 minutes and released in good condition.           A: appears HG    P: LEEP in OR if indicated

## 2021-06-22 LAB
ANNOTATION COMMENT IMP: ABNORMAL
CPT CODES, 490044: ABNORMAL
CPT DISCLAIMER: ABNORMAL
CYTOLOGY SPEC DOC CYTO: ABNORMAL
DIAGNOSIS SYNOPSIS:: ABNORMAL
DX ICD CODE: ABNORMAL
DX ICD CODE: ABNORMAL
PATH REPORT.GROSS SPEC: ABNORMAL
PATH REPORT.RELEVANT HX SPEC: ABNORMAL
PATHOLOGIST NAME: ABNORMAL
SPECIMEN SOURCE: ABNORMAL

## 2021-06-24 NOTE — PROGRESS NOTES
[facility]  OFFICE PROCEDURE PROGRESS NOTE        Chart reviewed for the following:   Aneudy URRUTIA, have reviewed the History, Physical and updated the Allergic reactions for Allegra Ortiz performed immediately prior to start of procedure:   Aneudy URRUTIA, have performed the following reviews on Love Shine prior to the start of the procedure:            * Patient was identified by name and date of birth   * Agreement on procedure being performed was verified  * Risks and Benefits explained to the patient  * Procedure site verified and marked as necessary  * Patient was positioned for comfort  * Consent was signed and verified     Time: 2:46 PM        Date of procedure: 2021    Procedure performed by:  Pramod Orellana MD    How tolerated by patient: tolerated the procedure well with no complications    Post Procedural Pain Scale: 2 - Hurts Little Bit    Comments: none      Mirena IUD INSERTION  Indications:  Love Shine is a ,  27 y.o. female / No LMP recorded. Patient has had an injection. Her LMP was normal in duration and amount of flow. She  presents for insertion of an IUD. The risks, benefits and alternatives of IUD insertion were discussed in detail at last visit. She also has reviewed Mirena information. She has elected to proceed with the insertion today and she states she has no further questions. A urine pregnancy test was negative   Procedure: The pelvic exam revealed normal external genitalia. On bimanual exam the uterus was anteverted and normal in size with no tenderness present. A speculum was inserted into the vagina and the cervix was visualized. The cervix was prepped with zephiran solution. The anterior lip of the cervix was grasped with a single toothed tenaculum. The uterus was sounded with a Sarmiento sound to 8 centimeters. A Mirena was then inserted without difficulty.  The string was cut to 3 centimeters. She experienced a moderate  amount of cramping. Post Procedure Status:   She tolerated the procedure with moderate discomfort. The patient was observed for 15 minutes after the insertion. There were no complications. Patient was discharged in stable condition.   The patient received Mirena lot number [id number]  Disc expulsion, bleeding  Fu in 4 weeks

## 2021-06-25 ENCOUNTER — OFFICE VISIT (OUTPATIENT)
Dept: OBGYN CLINIC | Age: 26
End: 2021-06-25
Payer: MEDICAID

## 2021-06-25 VITALS — BODY MASS INDEX: 24.56 KG/M2 | WEIGHT: 130 LBS | DIASTOLIC BLOOD PRESSURE: 74 MMHG | SYSTOLIC BLOOD PRESSURE: 129 MMHG

## 2021-06-25 DIAGNOSIS — N94.89 SUPPRESSION OF MENSES: ICD-10-CM

## 2021-06-25 DIAGNOSIS — Z30.430 ENCOUNTER FOR IUD INSERTION: Primary | ICD-10-CM

## 2021-06-25 PROCEDURE — 58300 INSERT INTRAUTERINE DEVICE: CPT | Performed by: OBSTETRICS & GYNECOLOGY

## 2021-06-28 RX ORDER — LEVONORGESTREL 52 MG/1
1 INTRAUTERINE DEVICE INTRAUTERINE ONCE
Qty: 1 DEVICE | Refills: 0
Start: 2021-06-28 | End: 2021-06-28 | Stop reason: CLARIF

## 2021-06-28 RX ORDER — LEVONORGESTREL 52 MG/1
1 INTRAUTERINE DEVICE INTRAUTERINE ONCE
Qty: 1 DEVICE | Refills: 0
Start: 2021-06-28 | End: 2021-06-28

## 2021-07-07 ENCOUNTER — OFFICE VISIT (OUTPATIENT)
Dept: OBGYN CLINIC | Age: 26
End: 2021-07-07
Payer: MEDICAID

## 2021-07-07 VITALS — WEIGHT: 128.6 LBS | SYSTOLIC BLOOD PRESSURE: 120 MMHG | BODY MASS INDEX: 24.3 KG/M2 | DIASTOLIC BLOOD PRESSURE: 70 MMHG

## 2021-07-07 DIAGNOSIS — R35.0 URINARY FREQUENCY: ICD-10-CM

## 2021-07-07 DIAGNOSIS — R39.15 URINARY URGENCY: Primary | ICD-10-CM

## 2021-07-07 LAB
BILIRUB UR QL STRIP: NEGATIVE
GLUCOSE UR-MCNC: NEGATIVE MG/DL
KETONES P FAST UR STRIP-MCNC: NEGATIVE MG/DL
PH UR STRIP: 6 [PH] (ref 4.6–8)
PROT UR QL STRIP: NEGATIVE
SP GR UR STRIP: 1 (ref 1–1.03)
UA UROBILINOGEN AMB POC: NORMAL (ref 0.2–1)
URINALYSIS CLARITY POC: NORMAL
URINALYSIS COLOR POC: YELLOW
URINE BLOOD POC: NORMAL
URINE LEUKOCYTES POC: NORMAL
URINE NITRITES POC: NEGATIVE

## 2021-07-07 PROCEDURE — 99213 OFFICE O/P EST LOW 20 MIN: CPT | Performed by: OBSTETRICS & GYNECOLOGY

## 2021-07-07 PROCEDURE — 81002 URINALYSIS NONAUTO W/O SCOPE: CPT | Performed by: OBSTETRICS & GYNECOLOGY

## 2021-07-07 RX ORDER — NITROFURANTOIN 25; 75 MG/1; MG/1
100 CAPSULE ORAL 2 TIMES DAILY
Qty: 14 CAPSULE | Refills: 0 | Status: SHIPPED | OUTPATIENT
Start: 2021-07-07 | End: 2022-07-27

## 2021-07-07 NOTE — PROGRESS NOTES
UTI note    Maxim Andres is a ,  27 y.o. female [address] who presents today with had no chief complaint listed for this encounter. . Her symptoms started 2 days ago, unchanged since that time. Discomfort is in the suprapubic area and does not radiate. Symptoms are not alleviated by hydration. Symptoms are exacerbated with activity. She feels urge to void and only voids small amounts    Patient's other complaints: constant urge to urinate, when urinating hardly anything comes out. Her symptoms are moderate. Patient denies back pain and fever. There is not any concern of sexual abuse. There is not a history of trauma to the genital area. Patient does not have a history of recurrent UTI. She does not have a history of pyelonephritis. She has a history of  has a past medical history of Abnormal Pap smear of cervix (10/25/2018) and HPV vaccine counseling. She also has no past medical history of Abnormal Papanicolaou smear of cervix, Adverse effect of anesthesia, Anemia, Asthma, Breast disorder, Chlamydia, Complication of anesthesia, Diabetes ([address] Difficult intubation, Disease of blood and blood forming organ, Epilepsy ([address] Essential hypertension, Genital herpes, Gonorrhea, Heart abnormality, Herpes gestationis, Herpes simplex virus (HSV) infection, Human immunodeficiency virus (HIV) disease ([address] Infertility, female, Kidney disease, Liver disease, Malignant hyperthermia due to anesthesia, Nausea & vomiting, Phlebitis and thrombophlebitis, Pituitary disorder ([address] Polycystic disease, ovaries, Postpartum depression, Pseudocholinesterase deficiency, Rhesus isoimmunization affecting pregnancy, Sickle cell disease ([address] Syphilis, Systemic lupus erythematosus ([address] Thyroid activity decreased, or Trauma. with the following surgical history  has no past surgical history on file. .  . She has not been evaluated for her current complaints.     Last urinalysis results are:    Urine dipstick shows negative for all components, positive for leukocytes, red blood cells. Results for orders placed or performed in visit on 08/29/16   AMB POC URINALYSIS DIP STICK MANUAL W/O MICRO     Status: None   Result Value Ref Range Status    Color (UA POC)       Clarity (UA POC)       Glucose (UA POC)  Negative     Bilirubin (UA POC) Negative Negative Final    Ketones (UA POC) Negative Negative Final    Specific gravity (UA POC)  1.001 - 1.035     Blood (UA POC) Trace Negative Final    pH (UA POC)  4.6 - 8.0     Protein (UA POC) Negative Negative mg/dL Final    Urobilinogen (UA POC)  0.2 - 1     Nitrites (UA POC) Negative Negative Final    Leukocyte esterase (UA POC) 4+ Negative Final       Past Medical History:   Diagnosis Date    Abnormal Pap smear of cervix 10/25/2018    LGSIL pap 10/25/2018;1/2020    HPV vaccine counseling     Pt completed Gardasil series     No past surgical history on file. Social History     Occupational History    Not on file   Tobacco Use    Smoking status: Never Smoker    Smokeless tobacco: Never Used   Substance and Sexual Activity    Alcohol use: No    Drug use: No    Sexual activity: Yes     Partners: Male     Birth control/protection: I.U.D. No family history on file. No Known Allergies  Prior to Admission medications    Medication Sig Start Date End Date Taking?  Authorizing Provider   medroxyPROGESTERone (DEPO-PROVERA) 150 mg/mL injection RTO for injection 5/7/21   Carline He MD        Review of Systems: History obtained from the patient  Constitutional: negative for weight loss, fever, night sweats  Breast: negative for breast lumps, nipple discharge, galactorrhea  GI: negative for change in bowel habits, abdominal pain, black or bloody stools  : see HPI, negative for vaginal discharge  MSK: negative for back pain, joint pain, muscle pain  Skin: negative for itching, rash, hives  Psych: negative for anxiety, depression, change in mood      Objective:  Visit Vitals  /70   Wt 128 lb 9.6 oz (58.3 kg)   BMI 24.30 kg/m²       Physical Exam:   PHYSICAL EXAMINATION    Constitutional  · Appearance: well-nourished, well developed, alert, in no acute distress    Gastrointestinal  · Abdominal Examination: abdomen non-tender to palpation, normal bowel sounds, no masses present  · Liver and spleen: no hepatomegaly present, spleen not palpable  · Hernias: no hernias identified    Genitourinary  · External Genitalia: normal appearance for age, no discharge present, no tenderness present, no inflammatory lesions present, no masses present, no atrophy present  · Vagina: normal vaginal vault without central or paravaginal defects, no discharge present, no inflammatory lesions present, no masses present  · Bladder: tender to palpation  · Urethra: appears normal  · Cervix: normal   · Uterus: normal size, shape and consistency  · Adnexa: no adnexal tenderness present, no adnexal masses present  · Perineum: perineum within normal limits, no evidence of trauma, no rashes or skin lesions present  · Anus: anus within normal limits, no hemorrhoids present  · Inguinal Lymph Nodes: no lymphadenopathy present    Skin  · General Inspection: no rash, no lesions identified    Neurologic/Psychiatric  · Mental Status:  · Orientation: grossly oriented to person, place and time  · Mood and Affect: mood normal, affect appropriate  Results for orders placed or performed in visit on 07/07/21   AMB POC URINALYSIS DIP STICK MANUAL W/O MICRO   Result Value Ref Range    Color (UA POC) Yellow     Clarity (UA POC) Cloudy     Glucose (UA POC) Negative Negative    Bilirubin (UA POC) Negative Negative    Ketones (UA POC) Negative Negative    Specific gravity (UA POC) 1.002 1.001 - 1.035    Blood (UA POC) Trace Negative    pH (UA POC) 6.0 4.6 - 8.0    Protein (UA POC) Negative Negative    Urobilinogen (UA POC) 0.2 mg/dL 0.2 - 1    Nitrites (UA POC) Negative Negative    Leukocyte esterase (UA POC) Trace Negative       Assessment:   Urine urgency    Plan:   macrobid  Send urine culture

## 2021-07-10 LAB — BACTERIA UR CULT: ABNORMAL

## 2021-07-20 ENCOUNTER — APPOINTMENT (OUTPATIENT)
Dept: ULTRASOUND IMAGING | Age: 26
End: 2021-07-20
Attending: EMERGENCY MEDICINE
Payer: MEDICAID

## 2021-07-20 ENCOUNTER — HOSPITAL ENCOUNTER (EMERGENCY)
Age: 26
Discharge: HOME | End: 2021-07-20
Attending: EMERGENCY MEDICINE
Payer: MEDICAID

## 2021-07-20 ENCOUNTER — APPOINTMENT (OUTPATIENT)
Dept: CT IMAGING | Age: 26
End: 2021-07-20
Attending: EMERGENCY MEDICINE
Payer: MEDICAID

## 2021-07-20 VITALS
TEMPERATURE: 98.1 F | DIASTOLIC BLOOD PRESSURE: 71 MMHG | OXYGEN SATURATION: 100 % | SYSTOLIC BLOOD PRESSURE: 98 MMHG | RESPIRATION RATE: 12 BRPM | HEART RATE: 72 BPM

## 2021-07-20 DIAGNOSIS — R10.819 ABDOMINAL TENDERNESS, REBOUND TENDERNESS PRESENCE NOT SPECIFIED, UNSPECIFIED LOCATION: ICD-10-CM

## 2021-07-20 DIAGNOSIS — N39.0 URINARY TRACT INFECTION WITHOUT HEMATURIA, SITE UNSPECIFIED: Primary | ICD-10-CM

## 2021-07-20 LAB
ALBUMIN SERPL-MCNC: 3.8 G/DL (ref 3.5–5)
ALBUMIN/GLOB SERPL: 0.9 {RATIO} (ref 1.1–2.2)
ALP SERPL-CCNC: 139 U/L (ref 45–117)
ALT SERPL-CCNC: 27 U/L (ref 12–78)
ANION GAP SERPL CALC-SCNC: 5 MMOL/L (ref 5–15)
APPEARANCE UR: CLEAR
AST SERPL-CCNC: 15 U/L (ref 15–37)
BACTERIA URNS QL MICRO: ABNORMAL /HPF
BASOPHILS # BLD: 0 K/UL (ref 0–0.1)
BASOPHILS NFR BLD: 0 % (ref 0–1)
BILIRUB SERPL-MCNC: 0.3 MG/DL (ref 0.2–1)
BILIRUB UR QL: NEGATIVE
BUN SERPL-MCNC: 11 MG/DL (ref 6–20)
BUN/CREAT SERPL: 16 (ref 12–20)
CALCIUM SERPL-MCNC: 8.9 MG/DL (ref 8.5–10.1)
CHLORIDE SERPL-SCNC: 108 MMOL/L (ref 97–108)
CO2 SERPL-SCNC: 27 MMOL/L (ref 21–32)
COLOR UR: ABNORMAL
COMMENT, HOLDF: NORMAL
CREAT SERPL-MCNC: 0.69 MG/DL (ref 0.55–1.02)
DIFFERENTIAL METHOD BLD: ABNORMAL
EOSINOPHIL # BLD: 0.2 K/UL (ref 0–0.4)
EOSINOPHIL NFR BLD: 2 % (ref 0–7)
EPITH CASTS URNS QL MICRO: ABNORMAL /LPF
ERYTHROCYTE [DISTWIDTH] IN BLOOD BY AUTOMATED COUNT: 13.1 % (ref 11.5–14.5)
GLOBULIN SER CALC-MCNC: 4.1 G/DL (ref 2–4)
GLUCOSE SERPL-MCNC: 122 MG/DL (ref 65–100)
GLUCOSE UR STRIP.AUTO-MCNC: NEGATIVE MG/DL
HCG UR QL: NEGATIVE
HCT VFR BLD AUTO: 42.3 % (ref 35–47)
HGB BLD-MCNC: 13.7 G/DL (ref 11.5–16)
HGB UR QL STRIP: ABNORMAL
IMM GRANULOCYTES # BLD AUTO: 0 K/UL (ref 0–0.04)
IMM GRANULOCYTES NFR BLD AUTO: 0 % (ref 0–0.5)
KETONES UR QL STRIP.AUTO: NEGATIVE MG/DL
LEUKOCYTE ESTERASE UR QL STRIP.AUTO: ABNORMAL
LIPASE SERPL-CCNC: 84 U/L (ref 73–393)
LYMPHOCYTES # BLD: 2.8 K/UL (ref 0.8–3.5)
LYMPHOCYTES NFR BLD: 24 % (ref 12–49)
MAGNESIUM SERPL-MCNC: 2.3 MG/DL (ref 1.6–2.4)
MCH RBC QN AUTO: 27.3 PG (ref 26–34)
MCHC RBC AUTO-ENTMCNC: 32.4 G/DL (ref 30–36.5)
MCV RBC AUTO: 84.4 FL (ref 80–99)
MONOCYTES # BLD: 0.9 K/UL (ref 0–1)
MONOCYTES NFR BLD: 8 % (ref 5–13)
NEUTS SEG # BLD: 7.6 K/UL (ref 1.8–8)
NEUTS SEG NFR BLD: 66 % (ref 32–75)
NITRITE UR QL STRIP.AUTO: NEGATIVE
NRBC # BLD: 0 K/UL (ref 0–0.01)
NRBC BLD-RTO: 0 PER 100 WBC
PH UR STRIP: 6 [PH] (ref 5–8)
PLATELET # BLD AUTO: 311 K/UL (ref 150–400)
PMV BLD AUTO: 10.6 FL (ref 8.9–12.9)
POTASSIUM SERPL-SCNC: 3.7 MMOL/L (ref 3.5–5.1)
PROT SERPL-MCNC: 7.9 G/DL (ref 6.4–8.2)
PROT UR STRIP-MCNC: ABNORMAL MG/DL
RBC # BLD AUTO: 5.01 M/UL (ref 3.8–5.2)
RBC #/AREA URNS HPF: ABNORMAL /HPF (ref 0–5)
SAMPLES BEING HELD,HOLD: NORMAL
SODIUM SERPL-SCNC: 140 MMOL/L (ref 136–145)
SP GR UR REFRACTOMETRY: 1.02 (ref 1–1.03)
UR CULT HOLD, URHOLD: NORMAL
UROBILINOGEN UR QL STRIP.AUTO: 0.2 EU/DL (ref 0.2–1)
WBC # BLD AUTO: 11.6 K/UL (ref 3.6–11)
WBC URNS QL MICRO: ABNORMAL /HPF (ref 0–4)

## 2021-07-20 PROCEDURE — 85025 COMPLETE CBC W/AUTO DIFF WBC: CPT

## 2021-07-20 PROCEDURE — 74177 CT ABD & PELVIS W/CONTRAST: CPT

## 2021-07-20 PROCEDURE — 80053 COMPREHEN METABOLIC PANEL: CPT

## 2021-07-20 PROCEDURE — 81025 URINE PREGNANCY TEST: CPT

## 2021-07-20 PROCEDURE — 74011000636 HC RX REV CODE- 636: Performed by: EMERGENCY MEDICINE

## 2021-07-20 PROCEDURE — 83735 ASSAY OF MAGNESIUM: CPT

## 2021-07-20 PROCEDURE — 76856 US EXAM PELVIC COMPLETE: CPT

## 2021-07-20 PROCEDURE — 83690 ASSAY OF LIPASE: CPT

## 2021-07-20 PROCEDURE — 74011250637 HC RX REV CODE- 250/637: Performed by: EMERGENCY MEDICINE

## 2021-07-20 PROCEDURE — 74011250636 HC RX REV CODE- 250/636: Performed by: EMERGENCY MEDICINE

## 2021-07-20 PROCEDURE — 87086 URINE CULTURE/COLONY COUNT: CPT

## 2021-07-20 PROCEDURE — 81001 URINALYSIS AUTO W/SCOPE: CPT

## 2021-07-20 PROCEDURE — 99284 EMERGENCY DEPT VISIT MOD MDM: CPT

## 2021-07-20 PROCEDURE — 96374 THER/PROPH/DIAG INJ IV PUSH: CPT

## 2021-07-20 PROCEDURE — 76830 TRANSVAGINAL US NON-OB: CPT

## 2021-07-20 RX ORDER — CEPHALEXIN 125 MG/5ML
500 POWDER, FOR SUSPENSION ORAL EVERY 6 HOURS
Status: DISCONTINUED | OUTPATIENT
Start: 2021-07-20 | End: 2021-07-20 | Stop reason: HOSPADM

## 2021-07-20 RX ORDER — KETOROLAC TROMETHAMINE 30 MG/ML
30 INJECTION, SOLUTION INTRAMUSCULAR; INTRAVENOUS
Status: COMPLETED | OUTPATIENT
Start: 2021-07-20 | End: 2021-07-20

## 2021-07-20 RX ORDER — CEPHALEXIN 250 MG/5ML
500 POWDER, FOR SUSPENSION ORAL EVERY 12 HOURS
Qty: 140 ML | Refills: 0 | Status: SHIPPED | OUTPATIENT
Start: 2021-07-20 | End: 2021-07-27

## 2021-07-20 RX ORDER — CEPHALEXIN 250 MG/1
500 CAPSULE ORAL
Status: DISCONTINUED | OUTPATIENT
Start: 2021-07-20 | End: 2021-07-20

## 2021-07-20 RX ADMIN — IOPAMIDOL 100 ML: 755 INJECTION, SOLUTION INTRAVENOUS at 04:09

## 2021-07-20 RX ADMIN — CEPHALEXIN 500 MG: 125 FOR SUSPENSION ORAL at 05:18

## 2021-07-20 RX ADMIN — KETOROLAC TROMETHAMINE 30 MG: 30 INJECTION, SOLUTION INTRAMUSCULAR; INTRAVENOUS at 04:21

## 2021-07-20 NOTE — ED NOTES
Dr. Tamara Cornell reviewed discharge instructions with the patient. Opportunity for questions and clarification was provided. The patient verbalized understanding. Patient discharged out of the ED via ambulation with no difficulty and in stable condition.

## 2021-07-20 NOTE — ED PROVIDER NOTES
53-year-old female with no significant past medical history presents with a chief complaint of left lower quadrant abdominal pain. Patient states that she had an IUD placed approximately 1 month ago. For the last 3 weeks she has had pain in this area which got acutely worse tonight. She has not had any urinary symptoms including dysuria or hematuria. She denies having fevers, pain, shortness of breath. She denies having any diarrhea, constipation or blood in her stool. Past Medical History:   Diagnosis Date    Abnormal Pap smear of cervix 10/25/2018    LGSIL pap 10/25/2018;1/2020    HPV vaccine counseling     Pt completed Gardasil series       No past surgical history on file. No family history on file. Social History     Socioeconomic History    Marital status: SINGLE     Spouse name: Not on file    Number of children: Not on file    Years of education: Not on file    Highest education level: Not on file   Occupational History    Not on file   Tobacco Use    Smoking status: Never Smoker    Smokeless tobacco: Never Used   Substance and Sexual Activity    Alcohol use: No    Drug use: No    Sexual activity: Yes     Partners: Male     Birth control/protection: I.U.D. Other Topics Concern    Not on file   Social History Narrative    Not on file     Social Determinants of Health     Financial Resource Strain:     Difficulty of Paying Living Expenses:    Food Insecurity:     Worried About Running Out of Food in the Last Year:     [address] in the Last Year:    Transportation Needs:     Lack of Transportation (Medical):      Lack of Transportation (Non-Medical):    Physical Activity:     Days of Exercise per Week:     Minutes of Exercise per Session:    Stress:     Feeling of Stress :    Social Connections:     Frequency of Communication with Friends and Family:     Frequency of Social Gatherings with Friends and Family:     Attends Congregational Services:     Active Member of Clubs or Organizations:     Attends Club or Organization Meetings:     Marital Status:    Intimate Partner Violence:     Fear of Current or Ex-Partner:     Emotionally Abused:     Physically Abused:     Sexually Abused: ALLERGIES: Patient has no known allergies. Review of Systems   Constitutional: Negative for fever. HENT: Negative for rhinorrhea. Respiratory: Negative for shortness of breath. Cardiovascular: Negative for chest pain. Gastrointestinal: Positive for abdominal pain. Genitourinary: Negative for dysuria. Musculoskeletal: Negative for back pain. Skin: Negative for wound. Neurological: Negative for headaches. Psychiatric/Behavioral: Negative for confusion. Vitals:    07/20/21 0136   BP: 105/60   Pulse: 92   Resp: 12   Temp: 98.7 °F (37.1 °C)   SpO2: 98%            Physical Exam  Vitals and nursing note reviewed. Constitutional:       General: She is not in acute distress. Appearance: Normal appearance. She is well-developed. She is not ill-appearing, toxic-appearing or diaphoretic. HENT:      Head: Normocephalic and atraumatic. Eyes:      Extraocular Movements: Extraocular movements intact. Cardiovascular:      Rate and Rhythm: Normal rate. Pulses: Normal pulses. Heart sounds: No murmur heard. No friction rub. No gallop. Pulmonary:      Effort: Pulmonary effort is normal. No respiratory distress. Breath sounds: Normal breath sounds. No stridor. No wheezing, rhonchi or rales. Abdominal:      General: Abdomen is flat. Bowel sounds are normal. There is no distension. Palpations: Abdomen is soft. Tenderness: There is abdominal tenderness in the left lower quadrant. Musculoskeletal:         General: Normal range of motion. Cervical back: Normal range of motion. Skin:     General: Skin is dry. Neurological:      Mental Status: She is alert and oriented to person, place, and time.    Psychiatric: Mood and Affect: Mood normal.          MDM  Number of Diagnoses or Management Options  Abdominal tenderness, rebound tenderness presence not specified, unspecified location  Urinary tract infection without hematuria, site unspecified  Diagnosis management comments: 24-year-old female presents with abdominal pain. Differentials include but are not limited to ovarian torsion, ectopic pregnancy, pyelonephritis, cystitis, colitis, bowel obstruction among others. CT abdomen pelvis along with endovaginal ultrasound was obtained. Laboratory studies were unremarkable. Pregnancy test is negative. Pelvic ultrasound and CT are unremarkable. Urinalysis shows pyuria concerning for acute urinary tract infection. Patient be treated with Keflex. She states that she is not able to swallow pills and will be provided with liquid Keflex. Discussed my clinical impression(s), any labs and/or radiology results with the patient. I answered any questions and addressed any concerns. Discussed the importance of following up with their primary care physician and/or specialist(s). Discussed signs or symptoms that would warrant return back to the ER for further evaluation. The patient is agreeable with discharge.        Amount and/or Complexity of Data Reviewed  Clinical lab tests: ordered and reviewed  Tests in the radiology section of CPT®: ordered and reviewed           Procedures

## 2021-07-20 NOTE — ED TRIAGE NOTES
Triage: pt advises she has had lower left abd pain for the last month. Pt prescribed bx for uti with no relief. Pt has recent IUD insertion.

## 2021-07-21 LAB
BACTERIA SPEC CULT: NORMAL
SERVICE CMNT-IMP: NORMAL

## 2021-08-30 ENCOUNTER — OFFICE VISIT (OUTPATIENT)
Dept: OBGYN CLINIC | Age: 26
End: 2021-08-30

## 2021-08-30 VITALS — BODY MASS INDEX: 24.19 KG/M2 | DIASTOLIC BLOOD PRESSURE: 74 MMHG | WEIGHT: 128 LBS | SYSTOLIC BLOOD PRESSURE: 108 MMHG

## 2021-08-30 DIAGNOSIS — Z30.431 IUD CHECK UP: Primary | ICD-10-CM

## 2021-08-30 PROCEDURE — 99212 OFFICE O/P EST SF 10 MIN: CPT | Performed by: OBSTETRICS & GYNECOLOGY

## 2021-08-30 NOTE — PROGRESS NOTES
This is a follow-up visit for Susan Sin is a ,  27 y.o. female [address] whose No LMP recorded. (Menstrual status: IUD). was on . She had an Mirena IUD placed 2021    Since the IUD placement, the patient has not had any unusual complaints. She has had some mild non-menstrual bleeding. She describes having a spotting amount of blood-tinged discharge which has occurred off and on since insertion of the Mirena. She has RLQ pain. She has had no fever. Associated signs and symptoms: she denies dyspareunia, expulsion, heavy bleeding, increased pain, fever, and pelvic pain. Ultrasound was done today and revealed appropriate placement of the IUD in the endometrial cavity. TRANSVAGINAL ULTRASOUND PERFORMED  UTERUS IS ANTEVERTED, NORMAL IN SIZE AND HETEROGENOUS IN ECHOGENICITY. THE ANTERIOR UTERINE WALL APPEARS BULKY AND HAS STRIATONS.  ENDOMETRIUM MEASURES 5-6MM IN THICKNESS. NO EVIDENCE OF MASSES OR ABNORMALITIES ARE SEEN. IUD IS SEEN IN THE PROPER POSITION WITHIN THE ENDOMETRIAL CAVITY IN THE UTERINE FUNDUS. RIGHT OVARY APPEARS WITHIN NORMAL LIMITS. LEFT OVARY APPEARS WITHIN NORMAL LIMITS. A FOLLICULAR CYST IS SEEN. NO FREE FLUID SEEN IN THE CDS. Past Medical History:   Diagnosis Date    Abnormal Pap smear of cervix 10/25/2018    LGSIL pap 10/25/2018;2020    HPV vaccine counseling     Pt completed Gardasil series     No past surgical history on file. Social History     Occupational History    Not on file   Tobacco Use    Smoking status: Never Smoker    Smokeless tobacco: Never Used   Substance and Sexual Activity    Alcohol use: No    Drug use: No    Sexual activity: Yes     Partners: Male     Birth control/protection: I.U.D. No family history on file. No Known Allergies  Prior to Admission medications    Medication Sig Start Date End Date Taking?  Authorizing Provider   nitrofurantoin, macrocrystal-monohydrate, (Macrobid) 100 mg capsule Take 1 Capsule by mouth two (2) times a day.  7/7/21   Анна Vásquez MD   medroxyPROGESTERone (DEPO-PROVERA) 150 mg/mL injection RTO for injection 5/7/21   Анна Vásquez MD        Review of Systems: History obtained from the patient  Constitutional: negative for weight loss, fever, night sweats  Breast: negative for breast lumps, nipple discharge, galactorrhea  GI: negative for change in bowel habits, abdominal pain, black or bloody stools  : negative for frequency, dysuria, hematuria, vaginal discharge  MSK: negative for back pain, joint pain, muscle pain  Skin: negative for itching, rash, hives  Psych: negative for anxiety, depression, change in mood      Objective:  Visit Vitals  /74   Wt 128 lb (58.1 kg)   BMI 24.19 kg/m²       Physical Exam:   PHYSICAL EXAMINATION    Constitutional  · Appearance: well-nourished, well developed, alert, in no acute distress    Gastrointestinal  · Abdominal Examination: abdomen non-tender to palpation, normal bowel sounds, no masses present  · Liver and spleen: no hepatomegaly present, spleen not palpable  · Hernias: no hernias identified    Genitourinary  · External Genitalia: normal appearance for age, no discharge present, no tenderness present, no inflammatory lesions present, no masses present, no atrophy present  · Vagina: normal vaginal vault without central or paravaginal defects, no discharge present, no inflammatory lesions present, no masses present  · Bladder: non-tender to palpation  · Urethra: appears normal  · Cervix: normal with IUD string visible and appropriate length   · Uterus: normal size, shape and consistency  · Adnexa: no adnexal tenderness present, no adnexal masses present  · Perineum: perineum within normal limits, no evidence of trauma, no rashes or skin lesions present    Skin  · General Inspection: no rash, no lesions identified    Neurologic/Psychiatric  · Mental Status:  · Orientation: grossly oriented to person, place and time  · Mood and Affect: mood normal, affect appropriate    Assessment:   Doing well with IUD    Plan:   Chronic RLQ pain - US normal see PCP

## 2021-09-28 PROBLEM — Z30.430 ENCOUNTER FOR IUD INSERTION: Status: ACTIVE | Noted: 2021-06-25

## 2021-09-28 NOTE — PROGRESS NOTES
[facility]  OFFICE PROCEDURE PROGRESS NOTE        Chart reviewed for the following:   Lui URRUTIA, have reviewed the History, Physical and updated the Allergic reactions for [address] performed immediately prior to start of procedure:   Lui URRUTIA, have performed the following reviews on Elder Galloway prior to the start of the procedure:            * Patient was identified by name and date of birth   * Agreement on procedure being performed was verified  * Risks and Benefits explained to the patient  * Procedure site verified and marked as necessary  * Patient was positioned for comfort  * Consent was signed and verified     Time: 3:59 PM          Date of procedure: 2021    Procedure performed by:  Luz Frankel, MD    How tolerated by patient: tolerated the procedure well with no complications    Post Procedural Pain Scale: 2 - Hurts Little Bit    Comments: none      -----------------------------------IUD REMOVAL---------------------  Indications for Removal:  Elder Galloway is a ,  27 y.o. female [employer] whose No LMP recorded. (Menstrual status: IUD). was on . who presents today for IUD removal. Her current IUD was placedthree months. She has had  problems with the IUD. She requests removal of the IUD because ofd severe pelvic pain. The IUD removal procedure was discussed with the patient and she had no further questions. Procedure: The patient was placed in a dorsal lithotomy position and appropriately draped. On bimanual exam the uterus was anterior and normal in size with no tenderness present. A speculum exam was performed and the cervix was visualized. The cervix was prepped with zephiran solution. The IUD string was visualized. Using ring forceps , the string was grasped and the IUD removed intact. The IUD was shown to the patient.      Procedure note: Nexplanon insertion    Elder Galloway is a ,  27 y.o. female / whose No LMP recorded. (Menstrual status: IUD). was on  presents for office insertion of an Terrea Abbot sub-dermal contraceptive implant. She has had an opportunity to read the Terrea Abbot \"Patient Labeling and Consent Form\". We counseled Joanne about the insertion and removal procedures as well as potential side-effects, benefits and risks. She state she had no further questions and signed the consent form. She has been using IUD to the present time. She confirmed that she has no allergies to Betadine or Xylocaine. She reclined on the examination table in the supine position with her right arm flexed at the elbow and externally rotated. The insertion site was identified and marked approximately 6-8 cm proximal to the elbow crease at the inner side of the upper arm over the triceps muscle below the groove between the biceps and triceps muscles. A second mary was placed 6-8 cm above the first mary. The insertion site was cleansed with Betadine antiseptic. Approximately 5 ml of 2% xylocaine without epinephrine were injected just under the skin along the planned insertion canal. The NEXPLANON sterile applicator was carefully removed from its blister pack and kept sterile. I removed the needle cap. I visually verified the presence of NEXPLANON inside the needle tip. The skin at the insertion site was then stretched by my thumb and index finger. I then inserted the needle tip through the skin at the appropriate angle to the skin surface, just until the skin has been penetrated. The needle was gently inserted to its full length. The slider was then pushed all the way and then released. I then removed the needle and palpated the implant in the appropriate location. The patient also palpated the implant in place. Both Joanne and I were able to confirm the presence of the Terrea Abbot in its subdermal location by palpation. The skin was cleansed and dried.  A small adhesive bandage was placed over the insertion site and then wrapped a pressure bandage over the site. There were no complication or problems. She demonstrated full active range of movement of her elbow, wrist, all five digits and denied numbness and tingling. Post-procedure:  She was told to remove the pressure dressing in 12-24 hours, to keep the incision area dry for 24 hours and to remove the Steristrip in several days. She was given our 24-hour phone number and encouraged to call if there are any problems. The patient User Card and Patient Chart Label were filled in. She was given the User Card for her records. She was advised to have the [facility] removed or replaced three years from today's date. The patient received Nexplanon lot number [id number].

## 2021-09-29 ENCOUNTER — OFFICE VISIT (OUTPATIENT)
Dept: OBGYN CLINIC | Age: 26
End: 2021-09-29
Payer: MEDICAID

## 2021-09-29 VITALS — SYSTOLIC BLOOD PRESSURE: 125 MMHG | WEIGHT: 132 LBS | DIASTOLIC BLOOD PRESSURE: 67 MMHG | BODY MASS INDEX: 24.94 KG/M2

## 2021-09-29 DIAGNOSIS — Z30.017 ENCOUNTER FOR INSERTION SUBDERMAL CONTRACEPTIVE: ICD-10-CM

## 2021-09-29 DIAGNOSIS — Z30.432 ENCOUNTER FOR IUD REMOVAL: Primary | ICD-10-CM

## 2021-09-29 PROCEDURE — 58301 REMOVE INTRAUTERINE DEVICE: CPT | Performed by: OBSTETRICS & GYNECOLOGY

## 2021-09-29 PROCEDURE — 11981 INSERTION DRUG DLVR IMPLANT: CPT | Performed by: OBSTETRICS & GYNECOLOGY

## 2022-02-10 ENCOUNTER — PATIENT MESSAGE (OUTPATIENT)
Dept: OBGYN CLINIC | Age: 27
End: 2022-02-10

## 2022-03-19 PROBLEM — Z30.430 ENCOUNTER FOR IUD INSERTION: Status: ACTIVE | Noted: 2021-06-25

## 2022-05-26 ENCOUNTER — HOSPITAL ENCOUNTER (EMERGENCY)
Age: 27
Discharge: HOME | End: 2022-05-26
Attending: EMERGENCY MEDICINE
Payer: MEDICAID

## 2022-05-26 ENCOUNTER — APPOINTMENT (OUTPATIENT)
Dept: CT IMAGING | Age: 27
End: 2022-05-26
Attending: EMERGENCY MEDICINE
Payer: MEDICAID

## 2022-05-26 VITALS
WEIGHT: 130 LBS | BODY MASS INDEX: 24.55 KG/M2 | RESPIRATION RATE: 16 BRPM | OXYGEN SATURATION: 99 % | HEART RATE: 84 BPM | DIASTOLIC BLOOD PRESSURE: 64 MMHG | HEIGHT: 61 IN | SYSTOLIC BLOOD PRESSURE: 110 MMHG | TEMPERATURE: 98.8 F

## 2022-05-26 DIAGNOSIS — R10.2 PELVIC PAIN: ICD-10-CM

## 2022-05-26 DIAGNOSIS — B96.89 BV (BACTERIAL VAGINOSIS): Primary | ICD-10-CM

## 2022-05-26 DIAGNOSIS — N76.0 BV (BACTERIAL VAGINOSIS): Primary | ICD-10-CM

## 2022-05-26 LAB
ALBUMIN SERPL-MCNC: 3.5 G/DL (ref 3.5–5)
ALBUMIN/GLOB SERPL: 0.9 {RATIO} (ref 1.1–2.2)
ALP SERPL-CCNC: 130 U/L (ref 45–117)
ALT SERPL-CCNC: 26 U/L (ref 12–78)
ANION GAP SERPL CALC-SCNC: 5 MMOL/L (ref 5–15)
APPEARANCE UR: ABNORMAL
AST SERPL-CCNC: 17 U/L (ref 15–37)
BACTERIA URNS QL MICRO: ABNORMAL /HPF
BASOPHILS # BLD: 0 K/UL (ref 0–0.1)
BASOPHILS NFR BLD: 0 % (ref 0–1)
BILIRUB SERPL-MCNC: 0.6 MG/DL (ref 0.2–1)
BILIRUB UR QL: NEGATIVE
BUN SERPL-MCNC: 10 MG/DL (ref 6–20)
BUN/CREAT SERPL: 15 (ref 12–20)
CALCIUM SERPL-MCNC: 9.1 MG/DL (ref 8.5–10.1)
CHLORIDE SERPL-SCNC: 109 MMOL/L (ref 97–108)
CLUE CELLS VAG QL WET PREP: NORMAL
CO2 SERPL-SCNC: 24 MMOL/L (ref 21–32)
COLOR UR: ABNORMAL
COMMENT, HOLDF: NORMAL
CREAT SERPL-MCNC: 0.66 MG/DL (ref 0.55–1.02)
DIFFERENTIAL METHOD BLD: NORMAL
EOSINOPHIL # BLD: 0.1 K/UL (ref 0–0.4)
EOSINOPHIL NFR BLD: 1 % (ref 0–7)
EPITH CASTS URNS QL MICRO: ABNORMAL /LPF
ERYTHROCYTE [DISTWIDTH] IN BLOOD BY AUTOMATED COUNT: 12.8 % (ref 11.5–14.5)
GLOBULIN SER CALC-MCNC: 4 G/DL (ref 2–4)
GLUCOSE SERPL-MCNC: 93 MG/DL (ref 65–100)
GLUCOSE UR STRIP.AUTO-MCNC: NEGATIVE MG/DL
HCG UR QL: NEGATIVE
HCT VFR BLD AUTO: 42.1 % (ref 35–47)
HGB BLD-MCNC: 13.8 G/DL (ref 11.5–16)
HGB UR QL STRIP: ABNORMAL
IMM GRANULOCYTES # BLD AUTO: 0 K/UL (ref 0–0.04)
IMM GRANULOCYTES NFR BLD AUTO: 0 % (ref 0–0.5)
KETONES UR QL STRIP.AUTO: NEGATIVE MG/DL
KOH PREP SPEC: NORMAL
LEUKOCYTE ESTERASE UR QL STRIP.AUTO: ABNORMAL
LIPASE SERPL-CCNC: 79 U/L (ref 73–393)
LYMPHOCYTES # BLD: 2.3 K/UL (ref 0.8–3.5)
LYMPHOCYTES NFR BLD: 28 % (ref 12–49)
MCH RBC QN AUTO: 27.5 PG (ref 26–34)
MCHC RBC AUTO-ENTMCNC: 32.8 G/DL (ref 30–36.5)
MCV RBC AUTO: 83.9 FL (ref 80–99)
MONOCYTES # BLD: 0.6 K/UL (ref 0–1)
MONOCYTES NFR BLD: 7 % (ref 5–13)
NEUTS SEG # BLD: 5.2 K/UL (ref 1.8–8)
NEUTS SEG NFR BLD: 64 % (ref 32–75)
NITRITE UR QL STRIP.AUTO: NEGATIVE
NRBC # BLD: 0 K/UL (ref 0–0.01)
NRBC BLD-RTO: 0 PER 100 WBC
PH UR STRIP: 6.5 [PH] (ref 5–8)
PLATELET # BLD AUTO: 313 K/UL (ref 150–400)
PMV BLD AUTO: 10.5 FL (ref 8.9–12.9)
POTASSIUM SERPL-SCNC: 4.2 MMOL/L (ref 3.5–5.1)
PROT SERPL-MCNC: 7.5 G/DL (ref 6.4–8.2)
PROT UR STRIP-MCNC: NEGATIVE MG/DL
RBC # BLD AUTO: 5.02 M/UL (ref 3.8–5.2)
RBC #/AREA URNS HPF: ABNORMAL /HPF (ref 0–5)
SAMPLES BEING HELD,HOLD: NORMAL
SERVICE CMNT-IMP: NORMAL
SODIUM SERPL-SCNC: 138 MMOL/L (ref 136–145)
SP GR UR REFRACTOMETRY: 1.02 (ref 1–1.03)
T VAGINALIS VAG QL WET PREP: NORMAL
UR CULT HOLD, URHOLD: NORMAL
UROBILINOGEN UR QL STRIP.AUTO: 1 EU/DL (ref 0.2–1)
WBC # BLD AUTO: 8.2 K/UL (ref 3.6–11)
WBC URNS QL MICRO: ABNORMAL /HPF (ref 0–4)

## 2022-05-26 PROCEDURE — 83690 ASSAY OF LIPASE: CPT

## 2022-05-26 PROCEDURE — 80053 COMPREHEN METABOLIC PANEL: CPT

## 2022-05-26 PROCEDURE — 74176 CT ABD & PELVIS W/O CONTRAST: CPT

## 2022-05-26 PROCEDURE — 81025 URINE PREGNANCY TEST: CPT

## 2022-05-26 PROCEDURE — 96374 THER/PROPH/DIAG INJ IV PUSH: CPT

## 2022-05-26 PROCEDURE — 87086 URINE CULTURE/COLONY COUNT: CPT

## 2022-05-26 PROCEDURE — 99284 EMERGENCY DEPT VISIT MOD MDM: CPT

## 2022-05-26 PROCEDURE — 74011250636 HC RX REV CODE- 250/636: Performed by: EMERGENCY MEDICINE

## 2022-05-26 PROCEDURE — 81001 URINALYSIS AUTO W/SCOPE: CPT

## 2022-05-26 PROCEDURE — 36415 COLL VENOUS BLD VENIPUNCTURE: CPT

## 2022-05-26 PROCEDURE — 87210 SMEAR WET MOUNT SALINE/INK: CPT

## 2022-05-26 PROCEDURE — 85025 COMPLETE CBC W/AUTO DIFF WBC: CPT

## 2022-05-26 RX ORDER — KETOROLAC TROMETHAMINE 30 MG/ML
15 INJECTION, SOLUTION INTRAMUSCULAR; INTRAVENOUS
Status: COMPLETED | OUTPATIENT
Start: 2022-05-26 | End: 2022-05-26

## 2022-05-26 RX ORDER — METRONIDAZOLE 500 MG/1
500 TABLET ORAL 2 TIMES DAILY
Qty: 14 TABLET | Refills: 0 | Status: SHIPPED | OUTPATIENT
Start: 2022-05-26 | End: 2022-06-02

## 2022-05-26 RX ADMIN — KETOROLAC TROMETHAMINE 15 MG: 30 INJECTION, SOLUTION INTRAMUSCULAR at 10:52

## 2022-05-26 NOTE — LETTER
NOTIFICATION RETURN TO WORK / SCHOOL    5/26/2022 1:40 PM    Ms. Charlynn Alpers  [id number] [facility] [address]      To Whom It May Concern:    Charlynn Alpers is currently under the care of [facility] EMERGENCY DEPT. She will return to work/school on 5/27/2022    If there are questions or concerns please have the patient contact our office. Sincerely,      No name on file.

## 2022-05-26 NOTE — ED PROVIDER NOTES
Please note that this dictation was completed with [employer], the computer voice recognition software.  Quite often unanticipated grammatical, syntax, homophones, and other interpretive errors are inadvertently transcribed by the computer software.  Please disregard these errors.  Please excuse any errors that have escaped final proofreading. Patient is a 47-year-old female presenting the ED for evaluation of left lower abdominal pain with onset 2 days ago. States that she has had similar pains in the past which have resolved on their own. Pain is described as intermittent, denies known exacerbation or alleviation factors. States pain feels similar to when she had an appendicitis several years ago. Also reports new vaginal discharge with onset yesterday, yellow, denies concern for STIs and does not wish to be tested today. Has not had a menstrual cycle many months secondary to C/ [address] 9 contraception. Denies fever, chills, chest pain, shortness of breath, nausea or vomiting, diarrhea, dysuria, hematuria, vaginal bleeding, or any additional medical complaints at this time. Past Medical History:   Diagnosis Date    Abnormal Pap smear of cervix 10/25/2018    LGSIL pap 10/25/2018;1/2020    Encounter for IUD insertion 06/25/2021    HPV vaccine counseling     Pt completed Gardasil series       No past surgical history on file. No family history on file. Social History     Socioeconomic History    Marital status: SINGLE     Spouse name: Not on file    Number of children: Not on file    Years of education: Not on file    Highest education level: Not on file   Occupational History    Not on file   Tobacco Use    Smoking status: Never Smoker    Smokeless tobacco: Never Used   Substance and Sexual Activity    Alcohol use: No    Drug use: No    Sexual activity: Yes     Partners: Male     Birth control/protection: I.U.D.    Other Topics Concern    Not on file   Social History Narrative    Not on file     Social Determinants of Health     Financial Resource Strain:     Difficulty of Paying Living Expenses: Not on file   Food Insecurity:     Worried About Running Out of Food in the Last Year: Not on file    Riana of Food in the Last Year: Not on file   Transportation Needs:     Lack of Transportation (Medical): Not on file    Lack of Transportation (Non-Medical): Not on file   Physical Activity:     Days of Exercise per Week: Not on file    Minutes of Exercise per Session: Not on file   Stress:     Feeling of Stress : Not on file   Social Connections:     Frequency of Communication with Friends and Family: Not on file    Frequency of Social Gatherings with Friends and Family: Not on file    Attends [employer] Services: Not on file    Active Member of [address] [place] [address] or Organizations: Not on file    Attends Club or Organization Meetings: Not on file    Marital Status: Not on file   Intimate Partner Violence:     Fear of Current or Ex-Partner: Not on file    Emotionally Abused: Not on file    Physically Abused: Not on file    Sexually Abused: Not on file   Housing Stability:     Unable to Pay for Housing in the Last Year: Not on file    Number of [place] in the Last Year: Not on file    Unstable Housing in the Last Year: Not on file         ALLERGIES: Patient has no known allergies. Review of Systems   Constitutional: Negative for chills and fever. HENT: Negative for congestion, ear pain and sore throat. Eyes: Negative for visual disturbance. Respiratory: Negative for cough and shortness of breath. Cardiovascular: Negative for chest pain. Gastrointestinal: Positive for abdominal pain. Negative for diarrhea, nausea and vomiting. Genitourinary: Positive for pelvic pain and vaginal discharge. Negative for dysuria, flank pain and vaginal bleeding. Musculoskeletal: Negative for back pain. Skin: Negative for color change.    Neurological: Negative for dizziness and headaches. Psychiatric/Behavioral: Negative for confusion. Vitals:    05/26/22 1027   BP: 110/64   Pulse: 84   Resp: 16   Temp: 98.8 °F (37.1 °C)   SpO2: 99%   Weight: 59 kg (130 lb)   Height: 5' 1\" (1.549 m)            Physical Exam  Vitals and nursing note reviewed. Exam conducted with a chaperone present (Renate Coughlin RN). Constitutional:       General: She is not in acute distress. Appearance: Normal appearance. She is not ill-appearing. HENT:      Head: Normocephalic and atraumatic. Eyes:      General: Vision grossly intact. Extraocular Movements: Extraocular movements intact. Conjunctiva/sclera: Conjunctivae normal.   Neck:      Trachea: Phonation normal.   Cardiovascular:      Rate and Rhythm: Normal rate and regular rhythm. Heart sounds: Normal heart sounds. Pulmonary:      Effort: Pulmonary effort is normal.      Breath sounds: Normal breath sounds and air entry. Abdominal:      Palpations: Abdomen is soft. Tenderness: There is abdominal tenderness in the left lower quadrant. Genitourinary:     General: Normal vulva. Vagina: Vaginal discharge (white/yellow, scant) present. Cervix: No cervical motion tenderness, friability, erythema or eversion. Musculoskeletal:         General: Normal range of motion. Skin:     General: Skin is warm and dry. Neurological:      General: No focal deficit present. Mental Status: She is alert and oriented to person, place, and time. Psychiatric:         Behavior: Behavior normal.          MDM  Number of Diagnoses or Management Options  BV (bacterial vaginosis)  Diagnosis management comments: Patient is alert, afebrile, vitals stable. Presents with 2 days of onset of pelvic pain and 1 day of yellow vaginal discharge. Denies STI concern, does not wish to be prophylactically treated. Tender in the left lower quadrant on exam.  Pelvic exam with scant vaginal discharge, no clinical signs of PID.   CT scan consistent with constipation, now acute pathology. Labs unremarkable. UA consistent with infection versus contamination, culture sent. Pregnancy negative. Vaginal swabs positive for bacterial vaginosis, G/C pending. Will start her on metronidazole. Suspect her UA may be contaminated due to vaginal discharge, will await culture for any treatment. She will follow-up with her PCP and OB/GYN. Return precautions outlined. All questions answered at this time. ED Course as of 05/26/22 1319   Thu May 26, 2022   1100 Pregnancy test,urine (POC): Negative [EP]   3538 CT ABD PELV WO CONT  IMPRESSION     1. Large volume of stool throughout the colon but no evidence of obstruction  2. Small bilateral nonobstructing renal stones [EP]   1319 WET PREP:    Clue cells CLUE CELLS PRESENT   Wet prep NO TRICHOMONAS SEEN [EP]      ED Course User Index  [EP] MAKAYLA Rosario     1:22 PM  Pt has been reevaluated. There are no new complaints, changes, or physical findings at this time. All results have been reviewed with patient and/or family. Medications have been reviewed w/ pt and/or family. Pt and/or family's questions have been answered. Pt and/or family expressed good understanding of the dx/tx/rx and is in agreement with plan of care. Pt instructed and agreed to f/u w/ PCP and GYN and to return to ED upon further deterioration. Return precautions outlined. All questions answered at this time. Pt is stable and ready for discharge. IMPRESSION:  1. BV (bacterial vaginosis)    2. Pelvic pain        PLAN:  1. Current Discharge Medication List      START taking these medications    Details   metroNIDAZOLE (FlagyL) 500 mg tablet Take 1 Tablet by mouth two (2) times a day for 7 days. Qty: 14 Tablet, Refills: 0  Start date: 5/26/2022, End date: 6/2/2022           2.    Follow-up Information     Follow up With Specialties Details Why Josefina Lugo MD Obstetrics & Gynecology, Gynecology, Obstetrics Schedule an appointment as soon as possible for a visit   [address] [place] [address] [address]  [phone]              Return to ED if worse     Procedures

## 2022-05-27 LAB
BACTERIA SPEC CULT: NORMAL
SERVICE CMNT-IMP: NORMAL

## 2022-07-27 ENCOUNTER — OFFICE VISIT (OUTPATIENT)
Dept: OBGYN CLINIC | Age: 27
End: 2022-07-27
Payer: MEDICAID

## 2022-07-27 VITALS
HEART RATE: 80 BPM | WEIGHT: 132 LBS | SYSTOLIC BLOOD PRESSURE: 125 MMHG | BODY MASS INDEX: 24.94 KG/M2 | DIASTOLIC BLOOD PRESSURE: 74 MMHG

## 2022-07-27 DIAGNOSIS — N93.9 VAGINAL BLEEDING: Primary | ICD-10-CM

## 2022-07-27 DIAGNOSIS — N93.9 ABNORMAL UTERINE BLEEDING (AUB): ICD-10-CM

## 2022-07-27 LAB
HCG URINE, QL. (POC): NEGATIVE
HGB BLD-MCNC: 13.2 G/DL
VALID INTERNAL CONTROL?: YES

## 2022-07-27 PROCEDURE — 85018 HEMOGLOBIN: CPT | Performed by: OBSTETRICS & GYNECOLOGY

## 2022-07-27 PROCEDURE — 81025 URINE PREGNANCY TEST: CPT | Performed by: OBSTETRICS & GYNECOLOGY

## 2022-07-27 PROCEDURE — 99213 OFFICE O/P EST LOW 20 MIN: CPT | Performed by: OBSTETRICS & GYNECOLOGY

## 2022-07-27 RX ORDER — IBUPROFEN 800 MG/1
800 TABLET ORAL
Qty: 60 TABLET | Refills: 0 | Status: SHIPPED | OUTPATIENT
Start: 2022-07-27

## 2022-07-27 NOTE — PROGRESS NOTES
Abnormal bleeding note      Ant Holley is a 24 y.o. female who complains of vaginal bleeding problems. Her current method of family planning is Nexplanon. First period since iUD out and nexplanon placed     She developed this problem approximately 2 weeks ago. She has had vaginal bleeding which she describes as heavy lasting up to a few weeks. Pad or tampon count: changes every 3 hours. Associated symptoms include none. Alleviating factors: none    Aggravating factors: none      The patient is sexually active. Last Pap smear:approximate date 5/7/21 and was abnormal.    Her relevant past medical history:   Past Medical History:   Diagnosis Date    Abnormal Pap smear of cervix 10/25/2018    LGSIL pap 10/25/2018;1/2020    Encounter for IUD insertion 06/25/2021    removed 9/2021    HPV vaccine counseling     Pt completed Gardasil series    Nexplanon insertion 09/2021        No past surgical history on file. Social History     Occupational History    Not on file   Tobacco Use    Smoking status: Never    Smokeless tobacco: Never   Substance and Sexual Activity    Alcohol use: No    Drug use: No    Sexual activity: Yes     Partners: Male     Birth control/protection: Implant     No family history on file. No Known Allergies  Prior to Admission medications    Medication Sig Start Date End Date Taking? Authorizing Provider   nitrofurantoin, macrocrystal-monohydrate, (Macrobid) 100 mg capsule Take 1 Capsule by mouth two (2) times a day.   Patient not taking: Reported on 7/27/2022 7/7/21   Mehrdad Abad MD   medroxyPROGESTERone (DEPO-PROVERA) 150 mg/mL injection RTO for injection  Patient not taking: Reported on 7/27/2022 5/7/21   Mehrdad Abad MD        Review of Systems - History obtained from the patient  Constitutional: negative for weight loss, fever, night sweats  HEENT: negative for hearing loss, earache, congestion, snoring, sorethroat  CV: negative for chest pain, palpitations, edema  Resp: negative for cough, shortness of breath, wheezing  Breast: negative for breast lumps, nipple discharge, galactorrhea  GI: negative for change in bowel habits, abdominal pain, black or bloody stools  : negative for frequency, dysuria, hematuria  MSK: negative for back pain, joint pain, muscle pain  Skin: negative for itching, rash, hives  Neuro: negative for dizziness, headache, confusion, weakness  Psych: negative for anxiety, depression, change in mood  Heme/lymph: negative for bleeding, bruising, pallor      Objective:    Visit Vitals  /74   Pulse 80   Wt 132 lb (59.9 kg)   BMI 24.94 kg/m²          PHYSICAL EXAMINATION    Constitutional  Appearance: well-nourished, well developed, alert, in no acute distress    HENT  Head and Face: appears normal    Neck  Inspection/Palpation: normal appearance, no masses or tenderness  Lymph Nodes: no lymphadenopathy present  Thyroid: gland size normal, nontender, no nodules or masses present on palpation    Breasts  Inspection of Breasts: breasts symmetrical, no skin changes, no discharge present, nipple appearance normal, no skin retraction present  Palpation of Breasts and Axillae: no masses present on palpation, no breast tenderness  Axillary Lymph Nodes: no lymphadenopathy present    Gastrointestinal  Abdominal Examination: abdomen non-tender to palpation, normal bowel sounds, no masses present  Liver and spleen: no hepatomegaly present, spleen not palpable  Hernias: no hernias identified    Genitourinary  External Genitalia: normal appearance for age, no discharge present, no tenderness present, no inflammatory lesions present, no masses present, no atrophy present  Vagina: normal vaginal vault without central or paravaginal defects, no discharge present, no inflammatory lesions present, no masses present  Bladder: non-tender to palpation  Urethra: appears normal  Cervix: normal   Uterus: normal size, shape and consistency  Adnexa: no adnexal tenderness present, no adnexal masses present  Perineum: perineum within normal limits, no evidence of trauma, no rashes or skin lesions present  Anus: anus within normal limits, no hemorrhoids present  Inguinal Lymph Nodes: no lymphadenopathy present    Skin  General Inspection: no rash, no lesions identified    Neurologic/Psychiatric  Mental Status:  Orientation: grossly oriented to person, place and time  Mood and Affect: mood normal, affect appropriate  Results for orders placed or performed in visit on 07/27/22   AMB POC HEMOGLOBIN (HGB)   Result Value Ref Range    Hemoglobin (POC) 13.2 G/DL   AMB POC URINE PREGNANCY TEST, VISUAL COLOR COMPARISON   Result Value Ref Range    VALID INTERNAL CONTROL POC Yes     HCG urine, Ql. (POC) Negative Negative       Assessment:   AU bleeding with nexplanon    Plan:   Patient advised prolonged bleeding with Nexplanon is normal.  She will try Motrin 800 mg every 8 hours for 5 days. Instructions given to pt. Handouts given to pt.

## 2023-02-01 ENCOUNTER — PATIENT MESSAGE (OUTPATIENT)
Dept: OBGYN CLINIC | Age: 28
End: 2023-02-01

## 2023-02-01 NOTE — TELEPHONE ENCOUNTER
From: Clifford Lopez  To: Quinton Vera MD  Sent: 2/1/2023 8:12 AM EST  Subject: Appointment     Joshua Barney can we please schedule my annual appointment for 2/20 ? I can do anytime you have available I will be off work.

## 2023-04-26 ENCOUNTER — OFFICE VISIT (OUTPATIENT)
Dept: PRIMARY CARE CLINIC | Age: 28
End: 2023-04-26
Payer: MEDICAID

## 2023-04-26 ENCOUNTER — PATIENT MESSAGE (OUTPATIENT)
Dept: OBGYN CLINIC | Age: 28
End: 2023-04-26

## 2023-04-26 VITALS
OXYGEN SATURATION: 100 % | RESPIRATION RATE: 16 BRPM | HEART RATE: 78 BPM | WEIGHT: 116.3 LBS | BODY MASS INDEX: 21.96 KG/M2 | DIASTOLIC BLOOD PRESSURE: 62 MMHG | SYSTOLIC BLOOD PRESSURE: 101 MMHG | TEMPERATURE: 97.3 F | HEIGHT: 61 IN

## 2023-04-26 DIAGNOSIS — N93.9 VAGINAL BLEEDING: Primary | ICD-10-CM

## 2023-04-26 DIAGNOSIS — R63.4 WEIGHT LOSS: ICD-10-CM

## 2023-04-26 LAB — HGB BLD-MCNC: 11.8 G/DL

## 2023-04-26 PROCEDURE — 85018 HEMOGLOBIN: CPT | Performed by: FAMILY MEDICINE

## 2023-04-26 PROCEDURE — 99204 OFFICE O/P NEW MOD 45 MIN: CPT | Performed by: FAMILY MEDICINE

## 2023-04-26 NOTE — PROGRESS NOTES
HPI     Chief Complaint   Patient presents with    [facility]     Weight loss and vaginal bleeding for 4 weeks. She is a 34 y.o. female who presents for establishing care. She started losing weight about 2 months ago. Has lost 10-15 lbs in 2 months. She did have a GI bug and it started after that. She states she doesn't have an appetite and gets nauseated when she eats. No diarrhea or vomiting. No blood in stool. No fever, chills, night sweats. Does not smoke or use marijuana. No fam hx of diabetes. She has been having vaginal bleeding x 4 weeks. Going through a pad or tampon every 4 hours. States she has clots about the size of her pinky nail. She does not think she could be pregnant. She has a Nexplanon. This has been in place since June 2022. She has not followed up with GYN about the bleeding. She has a hx of anemia in 2017 when she was pregnant. Denies any chance of pregnancy and declines UPT. Review of Systems   Constitutional:  Positive for unexpected weight change. Negative for chills and fever. Genitourinary:  Positive for vaginal bleeding. Reviewed PmHx, RxHx, FmHx, SocHx, AllgHx and updated and dated in the chart. Physical Exam:  Visit Vitals  /62 (BP 1 Location: Left upper arm)   Pulse 78   Temp 97.3 °F (36.3 °C) (Temporal)   Resp 16   Ht 5' 1\" (1.549 m)   Wt 116 lb 4.8 oz (52.8 kg)   LMP 04/26/2023   SpO2 100%   BMI 21.97 kg/m²     Physical Exam  Vitals and nursing note reviewed. Constitutional:       General: She is not in acute distress. Appearance: Normal appearance. She is not ill-appearing. Cardiovascular:      Rate and Rhythm: Normal rate and regular rhythm. Heart sounds: No murmur heard. Pulmonary:      Effort: Pulmonary effort is normal. No respiratory distress. Breath sounds: Normal breath sounds. No wheezing, rhonchi or rales. Genitourinary:     Comments: Pelvic - Exam chaperoned by Grace Tracey MA. Blood is present on perineum. Pink and moist vaginal mucosa. There is blood in vaginal canal. Cervix without lesions. Blood is coming from cervical os. Neurological:      General: No focal deficit present. Mental Status: She is alert. Psychiatric:         Mood and Affect: Mood normal.         Behavior: Behavior normal.     POC Hg 11.8  Declines UPT  Recent Results (from the past 12 hour(s))   AMB POC HEMOGLOBIN (HGB)    Collection Time: 04/26/23  3:33 PM   Result Value Ref Range    Hemoglobin (POC) 11.8 G/DL          Assessment / Plan     Diagnoses and all orders for this visit:    1. Vaginal bleeding - Hg stable. Needs to follow up with GYN. Could be side effect of Nexplanon. May need transvaginal US. Instructed to follow up with Dr. Nilam Mullins.   -     Fredo Amador; Future  -     AMB POC HEMOGLOBIN (HGB)    2. Weight loss - New problem with unclear etiology with unclear prognosis. Check labs. If normal needs to follow up with GI for likely EGD.   -     CBC WITH AUTOMATED DIFF; Future  -     METABOLIC PANEL, COMPREHENSIVE; Future  -     TSH 3RD GENERATION; Future  -     HEMOGLOBIN A1C WITH EAG; Future  -     URINALYSIS W/ RFLX MICROSCOPIC; Future  -     REFERRAL TO GASTROENTEROLOGY    I have discussed the diagnosis with the patient and the intended plan as seen in the above orders. The patient has received an after-visit summary and questions were answered concerning future plans. I have discussed medication side effects and warnings with the patient as well.     Mayte Ramirez, DO

## 2023-04-27 LAB
ALBUMIN SERPL-MCNC: 4.6 G/DL (ref 3.9–5)
ALBUMIN/GLOB SERPL: 1.6 {RATIO} (ref 1.2–2.2)
ALP SERPL-CCNC: 116 IU/L (ref 44–121)
ALT SERPL-CCNC: 14 IU/L (ref 0–32)
APPEARANCE UR: CLEAR
AST SERPL-CCNC: 13 IU/L (ref 0–40)
BACTERIA #/AREA URNS HPF: ABNORMAL /[HPF]
BASOPHILS # BLD AUTO: 0 X10E3/UL (ref 0–0.2)
BASOPHILS NFR BLD AUTO: 0 %
BILIRUB SERPL-MCNC: 0.3 MG/DL (ref 0–1.2)
BILIRUB UR QL STRIP: NEGATIVE
BUN SERPL-MCNC: 13 MG/DL (ref 6–20)
BUN/CREAT SERPL: 21 (ref 9–23)
CALCIUM SERPL-MCNC: 9.8 MG/DL (ref 8.7–10.2)
CASTS URNS QL MICRO: ABNORMAL /LPF
CHLORIDE SERPL-SCNC: 104 MMOL/L (ref 96–106)
CO2 SERPL-SCNC: 20 MMOL/L (ref 20–29)
COLOR UR: YELLOW
CREAT SERPL-MCNC: 0.61 MG/DL (ref 0.57–1)
EGFRCR SERPLBLD CKD-EPI 2021: 126 ML/MIN/1.73
EOSINOPHIL # BLD AUTO: 0.1 X10E3/UL (ref 0–0.4)
EOSINOPHIL NFR BLD AUTO: 2 %
EPI CELLS #/AREA URNS HPF: ABNORMAL /HPF (ref 0–10)
ERYTHROCYTE [DISTWIDTH] IN BLOOD BY AUTOMATED COUNT: 14.7 % (ref 11.7–15.4)
EST. AVERAGE GLUCOSE BLD GHB EST-MCNC: 114 MG/DL
GLOBULIN SER CALC-MCNC: 2.9 G/DL (ref 1.5–4.5)
GLUCOSE SERPL-MCNC: 91 MG/DL (ref 70–99)
GLUCOSE UR QL STRIP: NEGATIVE
HBA1C MFR BLD: 5.6 % (ref 4.8–5.6)
HCT VFR BLD AUTO: 39 % (ref 34–46.6)
HGB BLD-MCNC: 12.8 G/DL (ref 11.1–15.9)
HGB UR QL STRIP: ABNORMAL
IMM GRANULOCYTES # BLD AUTO: 0 X10E3/UL (ref 0–0.1)
IMM GRANULOCYTES NFR BLD AUTO: 0 %
KETONES UR QL STRIP: NEGATIVE
LEUKOCYTE ESTERASE UR QL STRIP: NEGATIVE
LYMPHOCYTES # BLD AUTO: 2 X10E3/UL (ref 0.7–3.1)
LYMPHOCYTES NFR BLD AUTO: 26 %
MCH RBC QN AUTO: 26.6 PG (ref 26.6–33)
MCHC RBC AUTO-ENTMCNC: 32.8 G/DL (ref 31.5–35.7)
MCV RBC AUTO: 81 FL (ref 79–97)
MICRO URNS: ABNORMAL
MONOCYTES # BLD AUTO: 0.6 X10E3/UL (ref 0.1–0.9)
MONOCYTES NFR BLD AUTO: 8 %
NEUTROPHILS # BLD AUTO: 4.8 X10E3/UL (ref 1.4–7)
NEUTROPHILS NFR BLD AUTO: 64 %
NITRITE UR QL STRIP: NEGATIVE
PH UR STRIP: 7 [PH] (ref 5–7.5)
PLATELET # BLD AUTO: 323 X10E3/UL (ref 150–450)
POTASSIUM SERPL-SCNC: 4.1 MMOL/L (ref 3.5–5.2)
PROT SERPL-MCNC: 7.5 G/DL (ref 6–8.5)
PROT UR QL STRIP: ABNORMAL
RBC # BLD AUTO: 4.81 X10E6/UL (ref 3.77–5.28)
RBC #/AREA URNS HPF: >30 /HPF (ref 0–2)
SODIUM SERPL-SCNC: 140 MMOL/L (ref 134–144)
SP GR UR STRIP: 1.02 (ref 1–1.03)
TSH SERPL DL<=0.005 MIU/L-ACNC: 1.33 UIU/ML (ref 0.45–4.5)
UROBILINOGEN UR STRIP-MCNC: 1 MG/DL (ref 0.2–1)
WBC # BLD AUTO: 7.5 X10E3/UL (ref 3.4–10.8)
WBC #/AREA URNS HPF: ABNORMAL /HPF (ref 0–5)

## 2023-04-27 NOTE — TELEPHONE ENCOUNTER
Called patient and spoke with her to schedule appointment for 4/28/2023 at 0900. Verified this time will work.

## 2023-04-28 ENCOUNTER — OFFICE VISIT (OUTPATIENT)
Dept: OBGYN CLINIC | Age: 28
End: 2023-04-28
Payer: MEDICAID

## 2023-04-28 VITALS — BODY MASS INDEX: 22.33 KG/M2 | WEIGHT: 118.2 LBS | SYSTOLIC BLOOD PRESSURE: 113 MMHG | DIASTOLIC BLOOD PRESSURE: 71 MMHG

## 2023-04-28 DIAGNOSIS — N93.9 ABNORMAL UTERINE BLEEDING (AUB): Primary | ICD-10-CM

## 2023-04-28 LAB
HCG URINE, QL. (POC): NEGATIVE
VALID INTERNAL CONTROL?: YES

## 2023-04-28 PROCEDURE — 81025 URINE PREGNANCY TEST: CPT | Performed by: OBSTETRICS & GYNECOLOGY

## 2023-04-28 PROCEDURE — 99213 OFFICE O/P EST LOW 20 MIN: CPT | Performed by: OBSTETRICS & GYNECOLOGY

## 2023-04-28 RX ORDER — MEDROXYPROGESTERONE ACETATE 150 MG/ML
150 INJECTION, SUSPENSION INTRAMUSCULAR ONCE
Qty: 1 ML | Refills: 0 | Status: SHIPPED | OUTPATIENT
Start: 2023-04-28 | End: 2023-04-28

## 2023-04-28 NOTE — PROGRESS NOTES
Velvet Rodriguez is a 34 y.o. female presents for a problem visit. Chief Complaint   Patient presents with    Vaginal Bleeding       No LMP recorded (lmp unknown). Patient has had an implant. Birth Control: Nexplanon inserted 9/21/21. Last Pap: 5/7/21 LSIL, Colpo 5/25/21 CIN1    The patient is reporting having: Abnormal Bleeding for 3  weeks. Changing pad every 4 hours with clots when using the bathroom; seems to be worse after intercourse. 1. Have you been to the ER, urgent care clinic, or hospitalized since your last visit? No    2. Have you seen or consulted any other health care providers outside of the [address] [place] [address] since your last visit? No    Examination chaperoned by Teetee Roberson LPN.

## 2023-04-28 NOTE — PROGRESS NOTES
OB/GYN Problem VIsit    HPI  Lynn White is a ,  34 y.o. female who presents for a problem visit. Has nexplanon placed . Complains of bleeding with Nexplanon. This has been going on pretty much continuously since last year. She is not anemic and has had her hemoglobin checked recently. She is sexually active. Nexplanon was placed in  after she had her IUD removed due to pelvic pain. She would like to switch to the Depo-Provera. Past Medical History:   Diagnosis Date    Abnormal Pap smear of cervix 10/25/2018    LGSIL pap 10/25/2018;2020    Encounter for IUD insertion 2021    removed 2021    HPV vaccine counseling     Pt completed Gardasil series    Nexplanon insertion 2021     History reviewed. No pertinent surgical history. Social History     Occupational History    Not on file   Tobacco Use    Smoking status: Never    Smokeless tobacco: Never   Vaping Use    Vaping Use: Never used   Substance and Sexual Activity    Alcohol use: No    Drug use: No    Sexual activity: Yes     Partners: Male     Birth control/protection: Implant     History reviewed. No pertinent family history. No Known Allergies  Prior to Admission medications    Medication Sig Start Date End Date Taking? Authorizing Provider   medroxyPROGESTERone (DEPO-PROVERA) 150 mg/mL injection 1 mL by IntraMUSCular route once for 1 dose. 23 Yes Casey Rojas MD   ibuprofen (MOTRIN) 800 mg tablet Take 1 Tablet by mouth every eight (8) hours as needed for Pain.  22  Yes Casey Rojas MD        Review of Systems: History obtained from the patient  Constitutional: negative for weight loss, fever, night sweats  Breast: negative for breast lumps, nipple discharge, galactorrhea  GI: negative for change in bowel habits, abdominal pain, black or bloody stools  : negative for frequency, dysuria, hematuria, vaginal discharge  MSK: negative for back pain, joint pain, muscle pain  Skin: negative for itching, rash, hives  Psych: negative for anxiety, depression, change in mood      Objective:  Visit Vitals  /71   Wt 118 lb 3.2 oz (53.6 kg)   LMP  (LMP Unknown)   BMI 22.33 kg/m²       Physical Exam:   PHYSICAL EXAMINATION    Constitutional  Appearance: well-nourished, well developed, alert, in no acute distress      Gastrointestinal  Abdominal Examination: abdomen non-tender to palpation, normal bowel sounds, no masses present  Liver and spleen: no hepatomegaly present, spleen not palpable  Hernias: no hernias identified    Genitourinary  External Genitalia: normal appearance for age, no discharge present, no tenderness present, no inflammatory lesions present, no masses present, no atrophy present  Vagina: normal vaginal vault without central or paravaginal defects, no discharge present, no inflammatory lesions present, no masses present  Bladder: non-tender to palpation  Urethra: appears normal  Cervix: normal   Uterus: normal size, shape and consistency  Adnexa: no adnexal tenderness present, no adnexal masses present  Perineum: perineum within normal limits, no evidence of trauma, no rashes or skin lesions present  Anus: anus within normal limits, no hemorrhoids present  Inguinal Lymph Nodes: no lymphadenopathy present    Skin  General Inspection: no rash, no lesions identified    Neurologic/Psychiatric  Mental Status:  Orientation: grossly oriented to person, place and time  Mood and Affect: mood normal, affect appropriate      ASSESSMENT:    ICD-10-CM ICD-9-CM    1.  Abnormal uterine bleeding (AUB)  N93.9 626.9 AMB POC URINE PREGNANCY TEST, VISUAL COLOR COMPARISON      CT/NG/T.VAGINALIS AMPLIFICATION      Hx of abnl pap  Bleeding is normal for Nexplanon    PLAN:  Orders Placed This Encounter    CT/NG/T.VAGINALIS AMPLIFICATION     Order Specific Question:   Specimen source     Answer:   Endocervical [[id number]]    AMB POC URINE PREGNANCY TEST, VISUAL COLOR COMPARISON    medroxyPROGESTERone (DEPO-PROVERA) 150 mg/mL injection     Si mL by IntraMUSCular route once for 1 dose. Dispense:  1 mL     Refill:  0     Needs pap and AE  Will start DP and remove Nexplanon - advised DP can cause irregular bleeding    RTO prn if symptoms persist or worsen. Instructions given to pt. Handouts given to pt.

## 2023-05-16 ENCOUNTER — TELEPHONE (OUTPATIENT)
Age: 28
End: 2023-05-16

## 2023-05-16 RX ORDER — MEDROXYPROGESTERONE ACETATE 150 MG/ML
150 INJECTION, SUSPENSION INTRAMUSCULAR ONCE
Qty: 1 ML | Refills: 0 | Status: SHIPPED | OUTPATIENT
Start: 2023-05-16 | End: 2023-05-17 | Stop reason: SDUPTHER

## 2023-05-16 NOTE — TELEPHONE ENCOUNTER
Patient has appointment for ae and depo injection    Prescription resent as per MD verbal  order to get patient to her appointment

## 2023-05-17 ENCOUNTER — OFFICE VISIT (OUTPATIENT)
Age: 28
End: 2023-05-17
Payer: COMMERCIAL

## 2023-05-17 VITALS — SYSTOLIC BLOOD PRESSURE: 107 MMHG | BODY MASS INDEX: 22.07 KG/M2 | DIASTOLIC BLOOD PRESSURE: 58 MMHG | WEIGHT: 116.8 LBS

## 2023-05-17 DIAGNOSIS — N92.6 IRREGULAR MENSES: ICD-10-CM

## 2023-05-17 DIAGNOSIS — Z01.419 ENCNTR FOR GYN EXAM (GENERAL) (ROUTINE) W/O ABN FINDINGS: Primary | ICD-10-CM

## 2023-05-17 DIAGNOSIS — Z30.42 ENCOUNTER FOR SURVEILLANCE OF INJECTABLE CONTRACEPTIVE: ICD-10-CM

## 2023-05-17 PROCEDURE — 99395 PREV VISIT EST AGE 18-39: CPT | Performed by: OBSTETRICS & GYNECOLOGY

## 2023-05-17 PROCEDURE — 96372 THER/PROPH/DIAG INJ SC/IM: CPT | Performed by: OBSTETRICS & GYNECOLOGY

## 2023-05-17 RX ORDER — MEDROXYPROGESTERONE ACETATE 150 MG/ML
150 INJECTION, SUSPENSION INTRAMUSCULAR
Status: SHIPPED | OUTPATIENT
Start: 2023-05-17

## 2023-05-17 RX ORDER — MEDROXYPROGESTERONE ACETATE 150 MG/ML
150 INJECTION, SUSPENSION INTRAMUSCULAR ONCE
Qty: 1 ML | Refills: 4 | Status: SHIPPED | OUTPATIENT
Start: 2023-05-17 | End: 2023-05-17

## 2023-05-17 RX ADMIN — MEDROXYPROGESTERONE ACETATE 150 MG: 150 INJECTION, SUSPENSION INTRAMUSCULAR at 09:39

## 2023-05-17 NOTE — PROGRESS NOTES
Shwetha Farley is a No obstetric history on file. ,  34 y.o. female [address] / [place] Native whose Patient's last menstrual period was 04/10/2023. was on 4/10/2023 who presents for her annual checkup. She is having no problems. Seen recently for irregular bleeding on nexplanon. Getting DP today      Menstrual status:    Her periods are light, moderate in flow, irregular    She denies dysmenorrhea. Contraception:    The current method of family planning is nexplanon    Sexual history:    She  has no history on file for sexual activity. Pap and Mammogram History:  Last Pap: 5/7/2021 LSIL colpo 5/25/2021 CINI pap 1 year  She does not have a history of TIFFANY 2, 3 or cervical cancer. With regard to the Gardisil vaccine, she has received all 3 injections         The patient does not have a family history of breast cancer. Past Medical History:   Diagnosis Date    Abnormal Pap smear of cervix 10/25/2018    LGSIL pap 10/25/2018;1/2020    Encounter for IUD insertion 06/25/2021    removed 9/2021    HPV vaccine counseling     Pt completed Gardasil series    Nexplanon insertion 09/2021     No past surgical history on file. Current Outpatient Medications   Medication Sig Dispense Refill    medroxyPROGESTERone (DEPO-PROVERA) 150 MG/ML injection Inject 1 mL into the muscle once for 1 dose 1 mL 0     No current facility-administered medications for this visit. Allergies: Patient has no known allergies.    Social History     Socioeconomic History    Marital status: Single     Spouse name: Not on file    Number of children: Not on file    Years of education: Not on file    Highest education level: Not on file   Occupational History    Not on file   Tobacco Use    Smoking status: Never    Smokeless tobacco: Never   Substance and Sexual Activity    Alcohol use: No    Drug use: No    Sexual activity: Not on file   Other Topics Concern    Not on file   Social History Narrative    Not on file     Social

## 2023-05-18 RX ORDER — IBUPROFEN 800 MG/1
800 TABLET ORAL EVERY 8 HOURS PRN
COMMUNITY
Start: 2022-07-27

## 2023-05-24 LAB
., LABCORP: NORMAL
CYTOLOGIST CVX/VAG CYTO: NORMAL
CYTOLOGY CVX/VAG DOC CYTO: NORMAL
CYTOLOGY CVX/VAG DOC THIN PREP: NORMAL
DX ICD CODE: NORMAL
Lab: NORMAL
OTHER STN SPEC: NORMAL
STAT OF ADQ CVX/VAG CYTO-IMP: NORMAL

## 2023-06-22 ENCOUNTER — APPOINTMENT (OUTPATIENT)
Facility: HOSPITAL | Age: 28
End: 2023-06-22
Payer: COMMERCIAL

## 2023-06-22 ENCOUNTER — HOSPITAL ENCOUNTER (EMERGENCY)
Facility: HOSPITAL | Age: 28
Discharge: HOME | End: 2023-06-22
Attending: STUDENT IN AN ORGANIZED HEALTH CARE EDUCATION/TRAINING PROGRAM
Payer: COMMERCIAL

## 2023-06-22 VITALS
HEART RATE: 98 BPM | HEIGHT: 61 IN | WEIGHT: 128 LBS | SYSTOLIC BLOOD PRESSURE: 120 MMHG | DIASTOLIC BLOOD PRESSURE: 55 MMHG | BODY MASS INDEX: 24.17 KG/M2 | OXYGEN SATURATION: 100 % | RESPIRATION RATE: 20 BRPM | TEMPERATURE: 98.5 F

## 2023-06-22 DIAGNOSIS — J06.9 VIRAL UPPER RESPIRATORY TRACT INFECTION: Primary | ICD-10-CM

## 2023-06-22 LAB
ALBUMIN SERPL-MCNC: 4.1 G/DL (ref 3.5–5)
ALBUMIN/GLOB SERPL: 1 (ref 1.1–2.2)
ALP SERPL-CCNC: 101 U/L (ref 45–117)
ALT SERPL-CCNC: 28 U/L (ref 12–78)
ANION GAP SERPL CALC-SCNC: 4 MMOL/L (ref 5–15)
AST SERPL-CCNC: 14 U/L (ref 15–37)
BASOPHILS # BLD: 0 K/UL (ref 0–0.1)
BASOPHILS NFR BLD: 0 % (ref 0–1)
BILIRUB SERPL-MCNC: 0.3 MG/DL (ref 0.2–1)
BUN SERPL-MCNC: 12 MG/DL (ref 6–20)
BUN/CREAT SERPL: 15 (ref 12–20)
CALCIUM SERPL-MCNC: 9.7 MG/DL (ref 8.5–10.1)
CHLORIDE SERPL-SCNC: 111 MMOL/L (ref 97–108)
CO2 SERPL-SCNC: 25 MMOL/L (ref 21–32)
COMMENT:: NORMAL
CREAT SERPL-MCNC: 0.81 MG/DL (ref 0.55–1.02)
DIFFERENTIAL METHOD BLD: NORMAL
EKG ATRIAL RATE: 72 BPM
EKG DIAGNOSIS: NORMAL
EKG P AXIS: 65 DEGREES
EKG P-R INTERVAL: 132 MS
EKG Q-T INTERVAL: 352 MS
EKG QRS DURATION: 80 MS
EKG QTC CALCULATION (BAZETT): 385 MS
EKG R AXIS: 5 DEGREES
EKG T AXIS: 51 DEGREES
EKG VENTRICULAR RATE: 72 BPM
EOSINOPHIL # BLD: 0.1 K/UL (ref 0–0.4)
EOSINOPHIL NFR BLD: 1 % (ref 0–7)
ERYTHROCYTE [DISTWIDTH] IN BLOOD BY AUTOMATED COUNT: 13.7 % (ref 11.5–14.5)
GLOBULIN SER CALC-MCNC: 4.3 G/DL (ref 2–4)
GLUCOSE SERPL-MCNC: 104 MG/DL (ref 65–100)
HCT VFR BLD AUTO: 41 % (ref 35–47)
HGB BLD-MCNC: 13.6 G/DL (ref 11.5–16)
IMM GRANULOCYTES # BLD AUTO: 0 K/UL (ref 0–0.04)
IMM GRANULOCYTES NFR BLD AUTO: 0 % (ref 0–0.5)
LYMPHOCYTES # BLD: 2 K/UL (ref 0.8–3.5)
LYMPHOCYTES NFR BLD: 25 % (ref 12–49)
MCH RBC QN AUTO: 26.9 PG (ref 26–34)
MCHC RBC AUTO-ENTMCNC: 33.2 G/DL (ref 30–36.5)
MCV RBC AUTO: 81.2 FL (ref 80–99)
MONOCYTES # BLD: 0.6 K/UL (ref 0–1)
MONOCYTES NFR BLD: 8 % (ref 5–13)
NEUTS SEG # BLD: 5.3 K/UL (ref 1.8–8)
NEUTS SEG NFR BLD: 66 % (ref 32–75)
NRBC # BLD: 0 K/UL (ref 0–0.01)
NRBC BLD-RTO: 0 PER 100 WBC
PLATELET # BLD AUTO: 307 K/UL (ref 150–400)
PMV BLD AUTO: 11.4 FL (ref 8.9–12.9)
POTASSIUM SERPL-SCNC: 4.2 MMOL/L (ref 3.5–5.1)
PROT SERPL-MCNC: 8.4 G/DL (ref 6.4–8.2)
RBC # BLD AUTO: 5.05 M/UL (ref 3.8–5.2)
SODIUM SERPL-SCNC: 140 MMOL/L (ref 136–145)
SPECIMEN HOLD: NORMAL
TROPONIN I SERPL HS-MCNC: <4 NG/L (ref 0–51)
WBC # BLD AUTO: 8 K/UL (ref 3.6–11)

## 2023-06-22 PROCEDURE — 80053 COMPREHEN METABOLIC PANEL: CPT

## 2023-06-22 PROCEDURE — 93010 ELECTROCARDIOGRAM REPORT: CPT | Performed by: SPECIALIST

## 2023-06-22 PROCEDURE — 84484 ASSAY OF TROPONIN QUANT: CPT

## 2023-06-22 PROCEDURE — 99285 EMERGENCY DEPT VISIT HI MDM: CPT

## 2023-06-22 PROCEDURE — 71046 X-RAY EXAM CHEST 2 VIEWS: CPT

## 2023-06-22 PROCEDURE — 85025 COMPLETE CBC W/AUTO DIFF WBC: CPT

## 2023-06-22 PROCEDURE — 93005 ELECTROCARDIOGRAM TRACING: CPT | Performed by: STUDENT IN AN ORGANIZED HEALTH CARE EDUCATION/TRAINING PROGRAM

## 2023-06-22 RX ORDER — BENZONATATE 200 MG/1
200 CAPSULE ORAL 3 TIMES DAILY PRN
Qty: 30 CAPSULE | Refills: 0 | Status: SHIPPED | OUTPATIENT
Start: 2023-06-22

## 2023-06-22 RX ORDER — OXYMETAZOLINE HYDROCHLORIDE 0.05 G/100ML
2 SPRAY NASAL 2 TIMES DAILY
Qty: 15 ML | Refills: 0 | Status: SHIPPED | OUTPATIENT
Start: 2023-06-22 | End: 2023-06-25

## 2023-06-22 RX ORDER — PSEUDOEPHEDRINE HCL 120 MG/1
120 TABLET, FILM COATED, EXTENDED RELEASE ORAL EVERY 12 HOURS PRN
Qty: 14 TABLET | Refills: 1 | Status: SHIPPED | OUTPATIENT
Start: 2023-06-22

## 2023-06-22 ASSESSMENT — ENCOUNTER SYMPTOMS
COUGH: 1
ABDOMINAL PAIN: 0
SHORTNESS OF BREATH: 0
EYE DISCHARGE: 0

## 2023-06-22 NOTE — DISCHARGE INSTRUCTIONS
Thank you for allowing us to provide you with medical care today. We realize that you have many choices for your emergency care needs. We thank you for choosing [facility]. Please choose us in the future for any continued health care needs. We hope we addressed all of your medical concerns. We strive to provide excellent quality care in the Emergency Department. Anything less than excellent does not meet our expectations. The exam and treatment you received in the Emergency Department were for an emergent problem and are not intended as complete care. It is important that you follow up with a doctor, nurse practitioner, or [id number] assistant for ongoing care. If your symptoms worsen or you do not improve as expected and you are unable to reach your usual health care provider, you should return to the Emergency Department. We are available 24 hours a day. Take this sheet with you when you go to your follow-up visit. If you have any problem arranging the follow-up visit, contact the Emergency Department immediately. Make an appointment your family doctor for follow up of this visit. Return to the ER if you are unable to be seen in a timely manner.

## 2023-06-22 NOTE — ED TRIAGE NOTES
Pt arrives to ED with complaints of \"severe cough\" and epigastric pain x3 weeks that has not improved. Pt also reports congestion. Pt denies SOB, fever, abd pain, NVD. Pt denies being around anyone else with similar symptoms. Pt denies gonzalo hx.

## 2023-06-22 NOTE — ED NOTES
Discharge paperwork reviewed with patient. Patient verbalizes understanding. Patient leaves ER ambulatory and in no acute distress.       Elyssa Herndon RN  06/22/23 [id number]

## 2023-06-22 NOTE — ED PROVIDER NOTES
[facility] EMERGENCY DEPT  EMERGENCY DEPARTMENT ENCOUNTER      Pt Name: Kanwal Acevedo  MRN: [id number]  [place]har[place]gfurt 1995  Date of evaluation: 6/22/2023  Provider: ALLISON Mcneill NP    HPI   Patient is a 34 y.o. female with no chronic health conditions  who presents today with complaints of cough. Associated nasal congestion, postnasal drip and sore throat. Endorses chest pain/epigastric pain when coughing. Denies ear pain, fevers, nausea, vomiting, diarrhea, and abdominal pain. Has not tried any over-the-counter medications. There are no other complaints, changes or physical findings at this time. PAST MEDICAL HISTORY     Past Medical History:   Diagnosis Date    Abnormal Pap smear of cervix 10/25/2018    LGSIL pap 10/25/2018;1/2020    Encounter for IUD insertion 06/25/2021    removed 9/2021    HPV vaccine counseling     Pt completed Gardasil series    Nexplanon insertion 09/2021         SURGICAL HISTORY     No past surgical history on file. CURRENT MEDICATIONS       Discharge Medication List as of 6/22/2023 10:25 AM        CONTINUE these medications which have NOT CHANGED    Details   ibuprofen (ADVIL;MOTRIN) 800 MG tablet Take 1 tablet by mouth every 8 hours as neededHistorical Med      medroxyPROGESTERone (DEPO-PROVERA) 150 MG/ML injection Inject 1 mL into the muscle once for 1 dose, Disp-1 mL, R-4Normal             ALLERGIES     Patient has no known allergies. FAMILY HISTORY     No family history on file.        SOCIAL HISTORY       Social History     Socioeconomic History    Marital status: Single   Tobacco Use    Smoking status: Never    Smokeless tobacco: Never   Substance and Sexual Activity    Alcohol use: No    Drug use: No     Social Determinants of Health     Financial Resource Strain: Unknown    Difficulty of Paying Living Expenses: Patient refused   Food Insecurity: Unknown    Worried About Running Out of Food in the Last Year: Patient refused    [address] in the

## 2023-08-04 NOTE — PROGRESS NOTES
Administered 0.5mL DEPO PROVERA INJECTION per MD order. Patient consent signed by patient. Injection given IM in the LEFT DELTOID . Patient tolerated well, no complications, no side effects. Last depo provera 5/17/2023. Advised patient to return to the office for next depo provera injection 10/25/2023- 11/9/2023    Lot # [id number]  Exp: 08/01/2024     Aj Varghese is a 34 y.o. female presents for a problem visit. Chief Complaint   Patient presents with    Procedure     Nexplanon removal/depo injection           No LMP recorded.       Chart reviewed for the following:   German JACKSON MA, have reviewed the History, Physical and updated the Allergic reactions for Mare Drummond     TIME OUT performed immediately prior to start of procedure:   German JACKSON MA, have performed the following reviews on Aj Varghese prior to the start of the procedure:            * Patient was identified by name and date of birth   * Agreement on procedure being performed was verified  * Risks and Benefits explained to the patient  * Procedure site verified and marked as necessary  * Patient was positioned for comfort  * Consent was signed and verified     Time: 9:00    Date of procedure: 8/9/2023    Procedure performed by:  Lesly Avila MD       How tolerated by patient: tolerated the procedure well with no complications    Post Procedural Pain Scale: 2 - Hurts Little Bit    Comments: none    Examination chaperoned by German Singh MA.

## 2023-08-08 RX ORDER — MEDROXYPROGESTERONE ACETATE 150 MG/ML
150 INJECTION, SUSPENSION INTRAMUSCULAR ONCE
Qty: 1 ML | Refills: 0 | Status: SHIPPED | OUTPATIENT
Start: 2023-08-08 | End: 2023-08-09 | Stop reason: SDUPTHER

## 2023-08-09 ENCOUNTER — PROCEDURE VISIT (OUTPATIENT)
Age: 28
End: 2023-08-09
Payer: COMMERCIAL

## 2023-08-09 VITALS — BODY MASS INDEX: 22.14 KG/M2 | SYSTOLIC BLOOD PRESSURE: 109 MMHG | WEIGHT: 117.2 LBS | DIASTOLIC BLOOD PRESSURE: 67 MMHG

## 2023-08-09 DIAGNOSIS — Z30.46 ENCOUNTER FOR NEXPLANON REMOVAL: Primary | ICD-10-CM

## 2023-08-09 DIAGNOSIS — Z30.42 ENCOUNTER FOR MANAGEMENT AND INJECTION OF DEPO-PROVERA: ICD-10-CM

## 2023-08-09 DIAGNOSIS — N92.1 IRREGULAR INTERMENSTRUAL BLEEDING: ICD-10-CM

## 2023-08-09 PROCEDURE — 96372 THER/PROPH/DIAG INJ SC/IM: CPT | Performed by: OBSTETRICS & GYNECOLOGY

## 2023-08-09 PROCEDURE — 11982 REMOVE DRUG IMPLANT DEVICE: CPT | Performed by: OBSTETRICS & GYNECOLOGY

## 2023-08-09 RX ORDER — MEDROXYPROGESTERONE ACETATE 150 MG/ML
150 INJECTION, SUSPENSION INTRAMUSCULAR ONCE
Qty: 1 ML | Refills: 3 | Status: SHIPPED | OUTPATIENT
Start: 2023-08-09 | End: 2023-08-09

## 2023-08-09 RX ORDER — MEDROXYPROGESTERONE ACETATE 150 MG/ML
150 INJECTION, SUSPENSION INTRAMUSCULAR
Status: SHIPPED | OUTPATIENT
Start: 2023-08-09

## 2023-08-09 RX ADMIN — MEDROXYPROGESTERONE ACETATE 150 MG: 150 INJECTION, SUSPENSION INTRAMUSCULAR at 09:11

## 2023-08-09 NOTE — PROGRESS NOTES
Procedure note: Nexplanon/Implanon removal    Kira Amor is a No obstetric history on file. ,  34 y.o. female whose No LMP recorded. Patient has had an injection. .  She presents for office removal of a NEXPLANON/IMPLANON sub-dermal contraceptive implant. Procedure:  She was positioned so the site of her implant was visable and easily accessible. The implant was located by palpation. The end of the implant nearest the elbow was marked with a sterile marker. The operative site was cleansed with Betadine. Using a 27 gauge needle on a 3cc syringe and 1% lidocaine, 3 cc were infiltrated as a intradermal wheal and underneath the end of the implant closest to the elbow. Downward pressure was applied on the end of the implant nearest the axilla and a 2-3mm incision was made in the longitudinal direction of the arm at the tip of the implant closest to the elbow. The implant was then pushed gently toward the incision until the tip was visible. The fibrous capsule was opened with a combination of blunt and sharp dissection. The implant was grasped with mosquito forceps and removed intact. It measured a full 4 cm in length. The skin was cleansed and dried. A steristrip was applied then topped with a folded 4x4 gauze and a Curlex pressure dressing. There were no complication or problems. She demonstrated full active range of movement of her elbow, wrist, all five digits and denied numbness and tingling. Post-procedure:  She was told to remove the dressing in 12-24 hours, to keep the incision area dry for 24 hours and to remove the Steristrip in 5-7 days. She was given our 24-hour phone number and encouraged to call if there are any problems. See progress note for family planning consultation.

## 2023-10-30 RX ORDER — MEDROXYPROGESTERONE ACETATE 150 MG/ML
150 INJECTION, SUSPENSION INTRAMUSCULAR ONCE
Qty: 1 ML | Refills: 3 | Status: SHIPPED | OUTPATIENT
Start: 2023-10-30 | End: 2023-10-30

## 2023-10-30 NOTE — PROGRESS NOTES
Administered 0.5mL DEPO PROVERA per MD order. Patient consent signed by patient. Injection given IM in the LEFT DELTOID . Patient tolerated well, no complications, no side effects.  Patient advised to return to office for next depo injection 1/16/2024-1/30/2024    Lot # [id number]  Exp: 05/01/2025

## 2023-10-31 ENCOUNTER — OFFICE VISIT (OUTPATIENT)
Age: 28
End: 2023-10-31
Payer: COMMERCIAL

## 2023-10-31 VITALS — DIASTOLIC BLOOD PRESSURE: 74 MMHG | SYSTOLIC BLOOD PRESSURE: 108 MMHG | BODY MASS INDEX: 22.48 KG/M2 | WEIGHT: 119 LBS

## 2023-10-31 DIAGNOSIS — Z30.42 ENCOUNTER FOR MANAGEMENT AND INJECTION OF DEPO-PROVERA: Primary | ICD-10-CM

## 2023-10-31 PROCEDURE — 96372 THER/PROPH/DIAG INJ SC/IM: CPT | Performed by: OBSTETRICS & GYNECOLOGY

## 2023-10-31 RX ORDER — MEDROXYPROGESTERONE ACETATE 150 MG/ML
150 INJECTION, SUSPENSION INTRAMUSCULAR
Status: SHIPPED | OUTPATIENT
Start: 2023-10-31

## 2023-10-31 RX ADMIN — MEDROXYPROGESTERONE ACETATE 150 MG: 150 INJECTION, SUSPENSION INTRAMUSCULAR at 10:42

## 2023-12-29 NOTE — ED NOTES
2:54 PM  I have evaluated the patient as the Provider in Triage. I have reviewed Her vital signs and the triage nurse assessment. I have talked with the patient and any available family and advised that I am the provider in triage and have ordered the appropriate study to initiate their work up based on the clinical presentation during my assessment. I have advised that the patient will be accommodated in the Main ED as soon as possible. I have also requested to contact the triage nurse or myself immediately if the patient experiences any changes in their condition during this brief waiting period.   Stew Means PA-C    Epigastric pain and nausea since this morning, no similar hx 45M, preDM, ?HTN (no meds), who is sent from cardiology clinic for possible admission. For the past several months, reports L sided chest pain     Chest pain.   with BHARDWAJ .  Had recent Stress test .   CT coronaries: No evidence of atherosclerotic plaque or stenosis in left main, RCA, LCx, and proximal and distal segments of the LAD.  Cardiology following.  start Lipitor 20mg qd on dc    DM (diabetes mellitus).   Will start SSI as sugars high .  HgbA1c 9.5%  start metformin on discharge    HTN (hypertension).   BP high so may need to start BP meds.     Transaminitis.   Likely secondary to Fatty liver.  RUQ US: hepatic steatosis     Hyperlipidemia .   Diet /Exercise and will start Lipitor.  Trending LFT.  repeat CMP in one week    Ureteric stone with hydronephrosis  CT Abdomen and Pelvis No Cont: Obstructing 5 mm calculus in the left UVJ with mild left hydroureteronephrosis.  Discussed with urology, will start flomax   outpatient urology f/u    Patient is medically cleared for discharge on 12/29/23. Discussed with Dr. Argueta and cardiology. 45M, preDM, ?HTN (no meds), who is sent from cardiology clinic for possible admission. For the past several months, reports L sided chest pain     Chest pain.   with BHARDWAJ .  Had recent Stress test  CT coronaries: No evidence of atherosclerotic plaque or stenosis in left main, RCA, LCx, and proximal and distal segments of the LAD.  Cardiology following.  start Lipitor 20mg qd on dc    DM (diabetes mellitus).   Will start SSI as sugars high .  HgbA1c 9.5%  start metformin on discharge    HTN (hypertension).   BP high so may need to start BP meds.     Transaminitis.   Likely secondary to Fatty liver.  RUQ US: hepatic steatosis     Hyperlipidemia .   Diet /Exercise and will start Lipitor.  Trending LFT.  repeat CMP in one week    Ureteric stone with hydronephrosis  CT Abdomen and Pelvis No Cont: Obstructing 5 mm calculus in the left UVJ with mild left hydroureteronephrosis.  Discussed with urology, will start flomax   outpatient urology f/u    Patient is medically cleared for discharge on 12/29/23. Discussed with Dr. Argueta and cardiology.

## 2024-01-23 NOTE — PROGRESS NOTES
Administered 0.5mL DEPO PROVERA per MD order. Patient consent signed by patient. Injection given IM in the LEFT DELTOID . Patient tolerated well, no complications, no side effects. Patient advised to return to the office for next depo 4/17/2024-5/1/2024    Lot # [id number]  Exp: 5/1/2025

## 2024-01-30 ENCOUNTER — OFFICE VISIT (OUTPATIENT)
Age: 29
End: 2024-01-30
Payer: COMMERCIAL

## 2024-01-30 DIAGNOSIS — Z30.42 ENCOUNTER FOR MANAGEMENT AND INJECTION OF DEPO-PROVERA: Primary | ICD-10-CM

## 2024-01-30 PROCEDURE — 96372 THER/PROPH/DIAG INJ SC/IM: CPT | Performed by: OBSTETRICS & GYNECOLOGY

## 2024-01-30 RX ORDER — MEDROXYPROGESTERONE ACETATE 150 MG/ML
150 INJECTION, SUSPENSION INTRAMUSCULAR
Status: SHIPPED | OUTPATIENT
Start: 2024-01-30

## 2024-01-30 RX ADMIN — MEDROXYPROGESTERONE ACETATE 150 MG: 150 INJECTION, SUSPENSION INTRAMUSCULAR at 09:43

## 2024-02-12 ENCOUNTER — HOSPITAL ENCOUNTER (EMERGENCY)
Facility: HOSPITAL | Age: 29
Discharge: HOME | End: 2024-02-12
Attending: EMERGENCY MEDICINE
Payer: MEDICAID

## 2024-02-12 VITALS
TEMPERATURE: 98.8 F | BODY MASS INDEX: 24.55 KG/M2 | DIASTOLIC BLOOD PRESSURE: 66 MMHG | WEIGHT: 130 LBS | HEART RATE: 66 BPM | SYSTOLIC BLOOD PRESSURE: 113 MMHG | OXYGEN SATURATION: 96 % | HEIGHT: 61 IN | RESPIRATION RATE: 16 BRPM

## 2024-02-12 DIAGNOSIS — H10.9 CONJUNCTIVITIS OF BOTH EYES, UNSPECIFIED CONJUNCTIVITIS TYPE: Primary | ICD-10-CM

## 2024-02-12 PROCEDURE — 99283 EMERGENCY DEPT VISIT LOW MDM: CPT

## 2024-02-12 RX ORDER — CIPROFLOXACIN HYDROCHLORIDE 3.5 MG/ML
1 SOLUTION/ DROPS TOPICAL
Qty: 5 ML | Refills: 0 | Status: SHIPPED | OUTPATIENT
Start: 2024-02-12 | End: 2024-02-20

## 2024-02-12 NOTE — ED PROVIDER NOTES
[facility] EMERGENCY DEPT  EMERGENCY DEPARTMENT ENCOUNTER      Pt Name: Mare Drummond  MRN: [id number]  Birthdate 1995  Date of evaluation: 2/12/2024  Provider: Sumit Martini MD    CHIEF COMPLAINT       Chief Complaint   Patient presents with    Conjunctivitis         HISTORY OF PRESENT ILLNESS   (Location/Symptom, Timing/Onset, Context/Setting, Quality, Duration, Modifying Factors, Severity)  Note limiting factors.   29-year-old female presents from home with eye irritation.  She woke up with symptoms yesterday.  Both eyes were red and puffy with increased tearing and irritation.  No known exposures or foreign bodies.  Does not wear contact lenses.  Denies any fever, cough, congestion.  Denies any visual changes.    The history is provided by the patient.         Review of External Medical Records:     Nursing Notes were reviewed.    REVIEW OF SYSTEMS    (2-9 systems for level 4, 10 or more for level 5)     Review of Systems   Constitutional:  Negative for fatigue.   HENT:  Negative for sore throat.    Eyes:  Negative for visual disturbance.   Respiratory:  Negative for cough.    Cardiovascular:  Negative for palpitations.   Gastrointestinal:  Negative for vomiting.   Genitourinary:  Negative for difficulty urinating.   Musculoskeletal:  Negative for myalgias.   Skin:  Negative for rash.   Neurological:  Negative for weakness.       Except as noted above the remainder of the review of systems was reviewed and negative.       PAST MEDICAL HISTORY     Past Medical History:   Diagnosis Date    Abnormal Pap smear of cervix 10/25/2018    LGSIL pap 10/25/2018;1/2020    Encounter for IUD insertion 06/25/2021    removed 9/2021    HPV vaccine counseling     Pt completed Gardasil series    Nexplanon insertion 09/2021         SURGICAL HISTORY     History reviewed. No pertinent surgical history.      CURRENT MEDICATIONS       Previous Medications    MEDROXYPROGESTERONE (DEPO-PROVERA) 150 MG/ML INJECTION    Inject 1 mL

## 2024-02-12 NOTE — ED TRIAGE NOTES
Patient presents ambulatory to treatment area with a steady gait.  Patient complains of bilateral eye redness, burning, swelling, and discharge (right worse than left) since yesterday. Denies injury to the eyes.  Denies known contact with anyone with conjunctivitis.

## 2024-02-12 NOTE — ED NOTES
The patient was discharged home by provider in stable condition. The patient is alert and oriented, in no respiratory distress. The patient's diagnosis, condition and treatment were explained. The patient expressed understanding. One prescription given. No work/school note given. A discharge plan has been developed. A  was not involved in the process. Aftercare instructions were given.  Pt ambulatory out of the ED.

## 2024-04-30 ENCOUNTER — NURSE ONLY (OUTPATIENT)
Age: 29
End: 2024-04-30
Payer: MEDICAID

## 2024-04-30 DIAGNOSIS — Z30.42 ENCOUNTER FOR MANAGEMENT AND INJECTION OF DEPO-PROVERA: Primary | ICD-10-CM

## 2024-04-30 PROCEDURE — 96372 THER/PROPH/DIAG INJ SC/IM: CPT | Performed by: OBSTETRICS & GYNECOLOGY

## 2024-04-30 RX ORDER — MEDROXYPROGESTERONE ACETATE 150 MG/ML
150 INJECTION, SUSPENSION INTRAMUSCULAR
Status: SHIPPED | OUTPATIENT
Start: 2024-04-30

## 2024-04-30 RX ADMIN — MEDROXYPROGESTERONE ACETATE 150 MG: 150 INJECTION, SUSPENSION INTRAMUSCULAR at 09:37

## 2024-04-30 NOTE — PROGRESS NOTES
Administered 0.5mL DEPO PROVERA  per MD order. Patient consent signed by patient. Injection given IM in the LEFT DELTOID . Patient tolerated well, no complications, no side effects. Patient advised to return to the office for next depo provera injection 7/16/2024- 7/30/2024    Lot # [id number]  Exp: 5/1/2025

## 2024-05-29 NOTE — PROGRESS NOTES
Mare Drummond is a 29 y.o. female presents for a problem visit.    Chief Complaint   Patient presents with    Urinary Pain     No LMP recorded. Patient has had an injection.      The patient is reporting having: painful urination since Monday    Examination chaperoned by Shannon Dawson MA.

## 2024-05-30 ENCOUNTER — OFFICE VISIT (OUTPATIENT)
Age: 29
End: 2024-05-30
Payer: MEDICAID

## 2024-05-30 VITALS — DIASTOLIC BLOOD PRESSURE: 73 MMHG | SYSTOLIC BLOOD PRESSURE: 118 MMHG | WEIGHT: 130 LBS | BODY MASS INDEX: 24.56 KG/M2

## 2024-05-30 DIAGNOSIS — R30.9 URINARY PAIN: Primary | ICD-10-CM

## 2024-05-30 LAB
BILIRUBIN, URINE, POC: NEGATIVE
BLOOD URINE, POC: NORMAL
GLUCOSE URINE, POC: NEGATIVE
KETONES, URINE, POC: NEGATIVE
LEUKOCYTE ESTERASE, URINE, POC: NORMAL
NITRITE, URINE, POC: POSITIVE
PH, URINE, POC: 7 (ref 4.6–8)
PROTEIN,URINE, POC: NEGATIVE
SPECIFIC GRAVITY, URINE, POC: 1.03 (ref 1–1.03)
URINALYSIS CLARITY, POC: NORMAL
URINALYSIS COLOR, POC: YELLOW
UROBILINOGEN, POC: NORMAL

## 2024-05-30 PROCEDURE — 81003 URINALYSIS AUTO W/O SCOPE: CPT | Performed by: OBSTETRICS & GYNECOLOGY

## 2024-05-30 PROCEDURE — 99213 OFFICE O/P EST LOW 20 MIN: CPT | Performed by: OBSTETRICS & GYNECOLOGY

## 2024-05-30 RX ORDER — NITROFURANTOIN 25; 75 MG/1; MG/1
100 CAPSULE ORAL 2 TIMES DAILY
Qty: 14 CAPSULE | Refills: 0 | Status: SHIPPED | OUTPATIENT
Start: 2024-05-30 | End: 2024-06-06

## 2024-05-30 SDOH — ECONOMIC STABILITY: FOOD INSECURITY: WITHIN THE PAST 12 MONTHS, YOU WORRIED THAT YOUR FOOD WOULD RUN OUT BEFORE YOU GOT MONEY TO BUY MORE.: PATIENT DECLINED

## 2024-05-30 SDOH — ECONOMIC STABILITY: FOOD INSECURITY: WITHIN THE PAST 12 MONTHS, THE FOOD YOU BOUGHT JUST DIDN'T LAST AND YOU DIDN'T HAVE MONEY TO GET MORE.: PATIENT DECLINED

## 2024-05-30 SDOH — ECONOMIC STABILITY: HOUSING INSECURITY
IN THE LAST 12 MONTHS, WAS THERE A TIME WHEN YOU DID NOT HAVE A STEADY PLACE TO SLEEP OR SLEPT IN A SHELTER (INCLUDING NOW)?: PATIENT DECLINED

## 2024-05-30 SDOH — ECONOMIC STABILITY: INCOME INSECURITY: HOW HARD IS IT FOR YOU TO PAY FOR THE VERY BASICS LIKE FOOD, HOUSING, MEDICAL CARE, AND HEATING?: PATIENT DECLINED

## 2024-05-30 ASSESSMENT — PATIENT HEALTH QUESTIONNAIRE - PHQ9
SUM OF ALL RESPONSES TO PHQ QUESTIONS 1-9: 0
SUM OF ALL RESPONSES TO PHQ9 QUESTIONS 1 & 2: 0
SUM OF ALL RESPONSES TO PHQ QUESTIONS 1-9: 0
SUM OF ALL RESPONSES TO PHQ QUESTIONS 1-9: 0
1. LITTLE INTEREST OR PLEASURE IN DOING THINGS: NOT AT ALL
2. FEELING DOWN, DEPRESSED OR HOPELESS: NOT AT ALL
SUM OF ALL RESPONSES TO PHQ QUESTIONS 1-9: 0

## 2024-05-30 NOTE — PROGRESS NOTES
OB/GYN Problem VIsit    HPI  Mare Drummond is a No obstetric history on file.,  29 y.o. female who presents for a problem visit.  Patient complains of 3 days of dysuria.  She denies fever or hematuria.  She has no back pain. Using DP        Past Medical History:   Diagnosis Date    Abnormal Pap smear of cervix 10/25/2018    LGSIL pap 10/25/2018;1/2020    Encounter for IUD insertion 06/25/2021    removed 9/2021    HPV vaccine counseling     Pt completed Gardasil series    Nexplanon insertion 09/2021     No past surgical history on file.  Social History     Occupational History    Not on file   Tobacco Use    Smoking status: Never    Smokeless tobacco: Never   Substance and Sexual Activity    Alcohol use: No    Drug use: No    Sexual activity: Not on file     No family history on file.    No Known Allergies  Prior to Admission medications    Medication Sig Start Date End Date Taking? Authorizing Provider   nitrofurantoin, macrocrystal-monohydrate, (MACROBID) 100 MG capsule Take 1 capsule by mouth 2 times daily for 7 days 5/30/24 6/6/24 Yes Renate Perales MD   medroxyPROGESTERone (DEPO-PROVERA) 150 MG/ML injection Inject 1 mL into the muscle once for 1 dose 10/30/23 2/12/24  Renate Perales MD        Review of Systems: History obtained from the patient  Constitutional: negative for weight loss, fever, night sweats  Breast: negative for breast lumps, nipple discharge, galactorrhea  GI: negative for change in bowel habits, abdominal pain, black or bloody stools  : negative for frequency, dysuria, hematuria, vaginal discharge  MSK: negative for back pain, joint pain, muscle pain  Skin: negative for itching, rash, hives  Psych: negative for anxiety, depression, change in mood      Objective:  /73   Wt 59 kg (130 lb)   BMI 24.56 kg/m²     Physical Exam:   PHYSICAL EXAMINATION    Constitutional  Appearance: well-nourished, well developed, alert, in no acute distress      Gastrointestinal  Abdominal Examination:

## 2024-06-01 LAB — BACTERIA UR CULT: ABNORMAL

## 2024-07-23 ENCOUNTER — OFFICE VISIT (OUTPATIENT)
Age: 29
End: 2024-07-23
Payer: MEDICAID

## 2024-07-23 VITALS — DIASTOLIC BLOOD PRESSURE: 70 MMHG | SYSTOLIC BLOOD PRESSURE: 114 MMHG | WEIGHT: 124 LBS | BODY MASS INDEX: 23.43 KG/M2

## 2024-07-23 DIAGNOSIS — Z01.419 ENCOUNTER FOR GYNECOLOGICAL EXAMINATION (GENERAL) (ROUTINE) WITHOUT ABNORMAL FINDINGS: Primary | ICD-10-CM

## 2024-07-23 PROCEDURE — 99459 PELVIC EXAMINATION: CPT | Performed by: OBSTETRICS & GYNECOLOGY

## 2024-07-23 PROCEDURE — 99395 PREV VISIT EST AGE 18-39: CPT | Performed by: OBSTETRICS & GYNECOLOGY

## 2024-07-23 RX ORDER — MEDROXYPROGESTERONE ACETATE 150 MG/ML
150 INJECTION, SUSPENSION INTRAMUSCULAR ONCE
Qty: 1 ML | Refills: 4 | Status: SHIPPED | OUTPATIENT
Start: 2024-07-23 | End: 2024-07-23

## 2024-07-23 NOTE — PROGRESS NOTES
Mare Drummond is a 29 y.o. female returns for an annual exam     Chief Complaint   Patient presents with    Annual Exam       No LMP recorded. Patient has had an injection.  Her periods are absent due to depo provera  She does not have dysmenorrhea.  Problems: no problems  Birth Control: Depo-Provera injections.  Last Pap: 5/17/2023 negative  She does not have a history of TIFFANY 2, 3 or cervical cancer.   With regard to the Gardisil vaccine, she has received all 3 injections        Examination chaperoned by Shannon Dawson MA.  
no skin retraction present  Palpation of Breasts and Axillae: no masses present on palpation, no breast tenderness  Axillary Lymph Nodes: no lymphadenopathy present    Gastrointestinal  Abdominal Examination: abdomen non-tender to palpation, normal bowel sounds, no masses present  Liver and spleen: no hepatomegaly present, spleen not palpable  Hernias: no hernias identified    Genitourinary  External Genitalia: normal appearance for age, no discharge present, no tenderness present, no inflammatory lesions present, no masses present, no atrophy present  Vagina: normal vaginal vault without central or paravaginal defects, no discharge present, no inflammatory lesions present, no masses present  Bladder: non-tender to palpation  Urethra: appears normal  Cervix: normal   Uterus: normal size, shape and consistency  Adnexa: no adnexal tenderness present, no adnexal masses present  Perineum: perineum within normal limits, no evidence of trauma, no rashes or skin lesions present  Anus: anus within normal limits, no hemorrhoids present  Inguinal Lymph Nodes: no lymphadenopathy present    Skin  General Inspection: no rash, no lesions identified    Neurologic/Psychiatric  Mental Status:  Orientation: grossly oriented to person, place and time  Mood and Affect: mood normal, affect appropriate    .  Assessment:  Routine gynecologic examination  Her current medical status is satisfactory with no evidence of significant gynecologic issues.    Plan:  Counseled re: diet, exercise, healthy lifestyle  Return for yearly wellness visits  Cont PASCUAL

## 2024-07-30 ENCOUNTER — NURSE ONLY (OUTPATIENT)
Age: 29
End: 2024-07-30
Payer: MEDICAID

## 2024-07-30 VITALS — DIASTOLIC BLOOD PRESSURE: 76 MMHG | WEIGHT: 125.8 LBS | SYSTOLIC BLOOD PRESSURE: 110 MMHG | BODY MASS INDEX: 23.77 KG/M2

## 2024-07-30 DIAGNOSIS — Z30.42 ENCOUNTER FOR SURVEILLANCE OF INJECTABLE CONTRACEPTIVE: Primary | ICD-10-CM

## 2024-07-30 PROCEDURE — 96372 THER/PROPH/DIAG INJ SC/IM: CPT | Performed by: OBSTETRICS & GYNECOLOGY

## 2024-07-30 RX ORDER — MEDROXYPROGESTERONE ACETATE 150 MG/ML
150 INJECTION, SUSPENSION INTRAMUSCULAR ONCE
Status: COMPLETED | OUTPATIENT
Start: 2024-07-30 | End: 2024-07-30

## 2024-07-30 RX ADMIN — MEDROXYPROGESTERONE ACETATE 150 MG: 150 INJECTION, SUSPENSION INTRAMUSCULAR at 10:22

## 2025-03-03 ENCOUNTER — HOSPITAL ENCOUNTER (EMERGENCY)
Facility: HOSPITAL | Age: 30
Discharge: HOME | End: 2025-03-03
Attending: EMERGENCY MEDICINE

## 2025-03-03 ENCOUNTER — APPOINTMENT (OUTPATIENT)
Facility: HOSPITAL | Age: 30
End: 2025-03-03

## 2025-03-03 VITALS
HEIGHT: 61 IN | RESPIRATION RATE: 16 BRPM | DIASTOLIC BLOOD PRESSURE: 54 MMHG | SYSTOLIC BLOOD PRESSURE: 126 MMHG | BODY MASS INDEX: 24.55 KG/M2 | TEMPERATURE: 98.2 F | OXYGEN SATURATION: 98 % | HEART RATE: 80 BPM | WEIGHT: 130 LBS

## 2025-03-03 DIAGNOSIS — R11.2 NAUSEA AND VOMITING, UNSPECIFIED VOMITING TYPE: ICD-10-CM

## 2025-03-03 DIAGNOSIS — R10.13 ABDOMINAL PAIN, EPIGASTRIC: Primary | ICD-10-CM

## 2025-03-03 DIAGNOSIS — K59.00 CONSTIPATION, UNSPECIFIED CONSTIPATION TYPE: ICD-10-CM

## 2025-03-03 LAB
ALBUMIN SERPL-MCNC: 4.2 G/DL (ref 3.5–5)
ALBUMIN/GLOB SERPL: 1.1 (ref 1.1–2.2)
ALP SERPL-CCNC: 133 U/L (ref 45–117)
ALT SERPL-CCNC: 23 U/L (ref 12–78)
ANION GAP SERPL CALC-SCNC: 6 MMOL/L (ref 2–12)
APPEARANCE UR: ABNORMAL
AST SERPL-CCNC: 15 U/L (ref 15–37)
BACTERIA URNS QL MICRO: ABNORMAL /HPF
BASOPHILS # BLD: 0.03 K/UL (ref 0–0.1)
BASOPHILS NFR BLD: 0.2 % (ref 0–1)
BILIRUB SERPL-MCNC: 0.3 MG/DL (ref 0.2–1)
BILIRUB UR QL: NEGATIVE
BUN SERPL-MCNC: 11 MG/DL (ref 6–20)
BUN/CREAT SERPL: 13 (ref 12–20)
CALCIUM SERPL-MCNC: 9.9 MG/DL (ref 8.5–10.1)
CHLORIDE SERPL-SCNC: 104 MMOL/L (ref 97–108)
CO2 SERPL-SCNC: 31 MMOL/L (ref 21–32)
COLOR UR: ABNORMAL
CREAT SERPL-MCNC: 0.82 MG/DL (ref 0.55–1.02)
DIFFERENTIAL METHOD BLD: ABNORMAL
EOSINOPHIL # BLD: 0.09 K/UL (ref 0–0.4)
EOSINOPHIL NFR BLD: 0.7 % (ref 0–7)
EPITH CASTS URNS QL MICRO: ABNORMAL /LPF
ERYTHROCYTE [DISTWIDTH] IN BLOOD BY AUTOMATED COUNT: 12.6 % (ref 11.5–14.5)
GLOBULIN SER CALC-MCNC: 3.9 G/DL (ref 2–4)
GLUCOSE SERPL-MCNC: 106 MG/DL (ref 65–100)
GLUCOSE UR STRIP.AUTO-MCNC: NEGATIVE MG/DL
HCG UR QL: NEGATIVE
HCT VFR BLD AUTO: 38.7 % (ref 35–47)
HGB BLD-MCNC: 13.2 G/DL (ref 11.5–16)
HGB UR QL STRIP: NEGATIVE
IMM GRANULOCYTES # BLD AUTO: 0.04 K/UL (ref 0–0.04)
IMM GRANULOCYTES NFR BLD AUTO: 0.3 % (ref 0–0.5)
KETONES UR QL STRIP.AUTO: ABNORMAL MG/DL
LEUKOCYTE ESTERASE UR QL STRIP.AUTO: NEGATIVE
LIPASE SERPL-CCNC: 27 U/L (ref 13–75)
LYMPHOCYTES # BLD: 2.99 K/UL (ref 0.8–3.5)
LYMPHOCYTES NFR BLD: 23.8 % (ref 12–49)
MCH RBC QN AUTO: 28.4 PG (ref 26–34)
MCHC RBC AUTO-ENTMCNC: 34.1 G/DL (ref 30–36.5)
MCV RBC AUTO: 83.2 FL (ref 80–99)
MONOCYTES # BLD: 0.98 K/UL (ref 0–1)
MONOCYTES NFR BLD: 7.8 % (ref 5–13)
MUCOUS THREADS URNS QL MICRO: ABNORMAL /LPF
NEUTS SEG # BLD: 8.43 K/UL (ref 1.8–8)
NEUTS SEG NFR BLD: 67.2 % (ref 32–75)
NITRITE UR QL STRIP.AUTO: NEGATIVE
NRBC # BLD: 0 K/UL (ref 0–0.01)
NRBC BLD-RTO: 0 PER 100 WBC
PH UR STRIP: 6 (ref 5–8)
PLATELET # BLD AUTO: 341 K/UL (ref 150–400)
PMV BLD AUTO: 10.7 FL (ref 8.9–12.9)
POTASSIUM SERPL-SCNC: 3.7 MMOL/L (ref 3.5–5.1)
PROT SERPL-MCNC: 8.1 G/DL (ref 6.4–8.2)
PROT UR STRIP-MCNC: NEGATIVE MG/DL
RBC # BLD AUTO: 4.65 M/UL (ref 3.8–5.2)
RBC #/AREA URNS HPF: ABNORMAL /HPF (ref 0–5)
SODIUM SERPL-SCNC: 141 MMOL/L (ref 136–145)
SP GR UR REFRACTOMETRY: 1.02 (ref 1–1.03)
UROBILINOGEN UR QL STRIP.AUTO: 1 EU/DL (ref 0.2–1)
WBC # BLD AUTO: 12.6 K/UL (ref 3.6–11)
WBC URNS QL MICRO: ABNORMAL /HPF (ref 0–4)

## 2025-03-03 PROCEDURE — 85025 COMPLETE CBC W/AUTO DIFF WBC: CPT

## 2025-03-03 PROCEDURE — 36415 COLL VENOUS BLD VENIPUNCTURE: CPT

## 2025-03-03 PROCEDURE — 96374 THER/PROPH/DIAG INJ IV PUSH: CPT

## 2025-03-03 PROCEDURE — 80053 COMPREHEN METABOLIC PANEL: CPT

## 2025-03-03 PROCEDURE — 96375 TX/PRO/DX INJ NEW DRUG ADDON: CPT

## 2025-03-03 PROCEDURE — 74019 RADEX ABDOMEN 2 VIEWS: CPT

## 2025-03-03 PROCEDURE — 99284 EMERGENCY DEPT VISIT MOD MDM: CPT

## 2025-03-03 PROCEDURE — 83690 ASSAY OF LIPASE: CPT

## 2025-03-03 PROCEDURE — 6370000000 HC RX 637 (ALT 250 FOR IP): Performed by: EMERGENCY MEDICINE

## 2025-03-03 PROCEDURE — 6360000002 HC RX W HCPCS: Performed by: EMERGENCY MEDICINE

## 2025-03-03 PROCEDURE — 81001 URINALYSIS AUTO W/SCOPE: CPT

## 2025-03-03 PROCEDURE — 81025 URINE PREGNANCY TEST: CPT

## 2025-03-03 PROCEDURE — 96361 HYDRATE IV INFUSION ADD-ON: CPT

## 2025-03-03 PROCEDURE — 2580000003 HC RX 258: Performed by: EMERGENCY MEDICINE

## 2025-03-03 RX ORDER — 0.9 % SODIUM CHLORIDE 0.9 %
1000 INTRAVENOUS SOLUTION INTRAVENOUS ONCE
Status: COMPLETED | OUTPATIENT
Start: 2025-03-03 | End: 2025-03-03

## 2025-03-03 RX ORDER — ONDANSETRON 2 MG/ML
4 INJECTION INTRAMUSCULAR; INTRAVENOUS ONCE
Status: COMPLETED | OUTPATIENT
Start: 2025-03-03 | End: 2025-03-03

## 2025-03-03 RX ORDER — ONDANSETRON 4 MG/1
4 TABLET, ORALLY DISINTEGRATING ORAL 3 TIMES DAILY PRN
Qty: 21 TABLET | Refills: 0 | Status: SHIPPED | OUTPATIENT
Start: 2025-03-03

## 2025-03-03 RX ORDER — KETOROLAC TROMETHAMINE 30 MG/ML
15 INJECTION, SOLUTION INTRAMUSCULAR; INTRAVENOUS ONCE
Status: COMPLETED | OUTPATIENT
Start: 2025-03-03 | End: 2025-03-03

## 2025-03-03 RX ADMIN — KETOROLAC TROMETHAMINE 15 MG: 30 INJECTION, SOLUTION INTRAMUSCULAR at 01:40

## 2025-03-03 RX ADMIN — ONDANSETRON 4 MG: 2 INJECTION, SOLUTION INTRAMUSCULAR; INTRAVENOUS at 01:40

## 2025-03-03 RX ADMIN — SODIUM CHLORIDE 1000 ML: 0.9 INJECTION, SOLUTION INTRAVENOUS at 01:40

## 2025-03-03 RX ADMIN — LIDOCAINE HYDROCHLORIDE 40 ML: 20 SOLUTION ORAL at 01:41

## 2025-03-03 ASSESSMENT — PAIN DESCRIPTION - FREQUENCY: FREQUENCY: CONTINUOUS

## 2025-03-03 ASSESSMENT — PAIN SCALES - GENERAL
PAINLEVEL_OUTOF10: 8
PAINLEVEL_OUTOF10: 4

## 2025-03-03 ASSESSMENT — PAIN - FUNCTIONAL ASSESSMENT: PAIN_FUNCTIONAL_ASSESSMENT: 0-10

## 2025-03-03 ASSESSMENT — PAIN DESCRIPTION - LOCATION: LOCATION: ABDOMEN

## 2025-03-03 ASSESSMENT — PAIN DESCRIPTION - DESCRIPTORS: DESCRIPTORS: ACHING

## 2025-03-03 ASSESSMENT — PAIN DESCRIPTION - ORIENTATION: ORIENTATION: MID

## 2025-03-03 ASSESSMENT — PAIN DESCRIPTION - PAIN TYPE: TYPE: ACUTE PAIN

## 2025-03-03 NOTE — ED TRIAGE NOTES
Pt reports abdominal pain and vomiting starting today. Pt states she has been constipated for 5 days. Pt denies urinary symptoms.

## 2025-03-03 NOTE — ED PROVIDER NOTES
Reviewed patient chart as she had called the ED, stating she was never given prescription for pain medications. Reviewed chief complaint and diagnosis. No obvious reason to prescribe additional meds but advised patient to come to ED if pain is worse     Brittany Thomas DO  03/03/25 4416

## 2025-03-16 ENCOUNTER — APPOINTMENT (OUTPATIENT)
Facility: HOSPITAL | Age: 30
End: 2025-03-16

## 2025-03-16 ENCOUNTER — HOSPITAL ENCOUNTER (EMERGENCY)
Facility: HOSPITAL | Age: 30
Discharge: HOME | End: 2025-03-16
Attending: STUDENT IN AN ORGANIZED HEALTH CARE EDUCATION/TRAINING PROGRAM

## 2025-03-16 VITALS
RESPIRATION RATE: 16 BRPM | BODY MASS INDEX: 23.6 KG/M2 | OXYGEN SATURATION: 98 % | HEART RATE: 95 BPM | HEIGHT: 61 IN | TEMPERATURE: 98.1 F | DIASTOLIC BLOOD PRESSURE: 66 MMHG | SYSTOLIC BLOOD PRESSURE: 128 MMHG | WEIGHT: 125 LBS

## 2025-03-16 DIAGNOSIS — R10.13 ABDOMINAL PAIN, EPIGASTRIC: Primary | ICD-10-CM

## 2025-03-16 DIAGNOSIS — R11.2 NAUSEA AND VOMITING, UNSPECIFIED VOMITING TYPE: ICD-10-CM

## 2025-03-16 LAB
ALBUMIN SERPL-MCNC: 3.6 G/DL (ref 3.5–5)
ALBUMIN/GLOB SERPL: 1 (ref 1.1–2.2)
ALP SERPL-CCNC: 119 U/L (ref 45–117)
ALT SERPL-CCNC: 25 U/L (ref 12–78)
ANION GAP SERPL CALC-SCNC: 6 MMOL/L (ref 2–12)
AST SERPL-CCNC: 14 U/L (ref 15–37)
BASOPHILS # BLD: 0.02 K/UL (ref 0–0.1)
BASOPHILS NFR BLD: 0.2 % (ref 0–1)
BILIRUB SERPL-MCNC: 0.4 MG/DL (ref 0.2–1)
BUN SERPL-MCNC: 14 MG/DL (ref 6–20)
BUN/CREAT SERPL: 23 (ref 12–20)
CALCIUM SERPL-MCNC: 8.6 MG/DL (ref 8.5–10.1)
CHLORIDE SERPL-SCNC: 105 MMOL/L (ref 97–108)
CO2 SERPL-SCNC: 26 MMOL/L (ref 21–32)
CREAT SERPL-MCNC: 0.62 MG/DL (ref 0.55–1.02)
DIFFERENTIAL METHOD BLD: NORMAL
EOSINOPHIL # BLD: 0.09 K/UL (ref 0–0.4)
EOSINOPHIL NFR BLD: 0.9 % (ref 0–7)
ERYTHROCYTE [DISTWIDTH] IN BLOOD BY AUTOMATED COUNT: 12.8 % (ref 11.5–14.5)
GLOBULIN SER CALC-MCNC: 3.6 G/DL (ref 2–4)
GLUCOSE SERPL-MCNC: 119 MG/DL (ref 65–100)
HCT VFR BLD AUTO: 38.1 % (ref 35–47)
HGB BLD-MCNC: 12.9 G/DL (ref 11.5–16)
IMM GRANULOCYTES # BLD AUTO: 0.03 K/UL (ref 0–0.04)
IMM GRANULOCYTES NFR BLD AUTO: 0.3 % (ref 0–0.5)
LIPASE SERPL-CCNC: 31 U/L (ref 13–75)
LYMPHOCYTES # BLD: 2.49 K/UL (ref 0.8–3.5)
LYMPHOCYTES NFR BLD: 25.2 % (ref 12–49)
MCH RBC QN AUTO: 28.1 PG (ref 26–34)
MCHC RBC AUTO-ENTMCNC: 33.9 G/DL (ref 30–36.5)
MCV RBC AUTO: 83 FL (ref 80–99)
MONOCYTES # BLD: 0.68 K/UL (ref 0–1)
MONOCYTES NFR BLD: 6.9 % (ref 5–13)
NEUTS SEG # BLD: 6.56 K/UL (ref 1.8–8)
NEUTS SEG NFR BLD: 66.5 % (ref 32–75)
NRBC # BLD: 0 K/UL (ref 0–0.01)
NRBC BLD-RTO: 0 PER 100 WBC
PLATELET # BLD AUTO: 292 K/UL (ref 150–400)
PMV BLD AUTO: 10.8 FL (ref 8.9–12.9)
POTASSIUM SERPL-SCNC: 3.9 MMOL/L (ref 3.5–5.1)
PROT SERPL-MCNC: 7.2 G/DL (ref 6.4–8.2)
RBC # BLD AUTO: 4.59 M/UL (ref 3.8–5.2)
SODIUM SERPL-SCNC: 137 MMOL/L (ref 136–145)
WBC # BLD AUTO: 9.9 K/UL (ref 3.6–11)

## 2025-03-16 PROCEDURE — 6360000004 HC RX CONTRAST MEDICATION: Performed by: STUDENT IN AN ORGANIZED HEALTH CARE EDUCATION/TRAINING PROGRAM

## 2025-03-16 PROCEDURE — 85025 COMPLETE CBC W/AUTO DIFF WBC: CPT

## 2025-03-16 PROCEDURE — 96375 TX/PRO/DX INJ NEW DRUG ADDON: CPT

## 2025-03-16 PROCEDURE — 80053 COMPREHEN METABOLIC PANEL: CPT

## 2025-03-16 PROCEDURE — 99285 EMERGENCY DEPT VISIT HI MDM: CPT

## 2025-03-16 PROCEDURE — 96374 THER/PROPH/DIAG INJ IV PUSH: CPT

## 2025-03-16 PROCEDURE — 74177 CT ABD & PELVIS W/CONTRAST: CPT

## 2025-03-16 PROCEDURE — 83690 ASSAY OF LIPASE: CPT

## 2025-03-16 PROCEDURE — 6360000002 HC RX W HCPCS: Performed by: STUDENT IN AN ORGANIZED HEALTH CARE EDUCATION/TRAINING PROGRAM

## 2025-03-16 PROCEDURE — 36415 COLL VENOUS BLD VENIPUNCTURE: CPT

## 2025-03-16 RX ORDER — DIPHENHYDRAMINE HYDROCHLORIDE 50 MG/ML
25 INJECTION, SOLUTION INTRAMUSCULAR; INTRAVENOUS
Status: COMPLETED | OUTPATIENT
Start: 2025-03-16 | End: 2025-03-16

## 2025-03-16 RX ORDER — METOCLOPRAMIDE HYDROCHLORIDE 5 MG/ML
10 INJECTION INTRAMUSCULAR; INTRAVENOUS
Status: COMPLETED | OUTPATIENT
Start: 2025-03-16 | End: 2025-03-16

## 2025-03-16 RX ORDER — METOCLOPRAMIDE 10 MG/1
10 TABLET ORAL 4 TIMES DAILY PRN
Qty: 120 TABLET | Refills: 0 | Status: SHIPPED | OUTPATIENT
Start: 2025-03-16

## 2025-03-16 RX ORDER — IOPAMIDOL 755 MG/ML
100 INJECTION, SOLUTION INTRAVASCULAR
Status: COMPLETED | OUTPATIENT
Start: 2025-03-16 | End: 2025-03-16

## 2025-03-16 RX ORDER — PANTOPRAZOLE SODIUM 40 MG/1
40 TABLET, DELAYED RELEASE ORAL
Qty: 180 TABLET | Refills: 0 | Status: SHIPPED | OUTPATIENT
Start: 2025-03-16

## 2025-03-16 RX ORDER — KETOROLAC TROMETHAMINE 30 MG/ML
15 INJECTION, SOLUTION INTRAMUSCULAR; INTRAVENOUS ONCE
Status: COMPLETED | OUTPATIENT
Start: 2025-03-16 | End: 2025-03-16

## 2025-03-16 RX ADMIN — DIPHENHYDRAMINE HYDROCHLORIDE 25 MG: 50 INJECTION INTRAMUSCULAR; INTRAVENOUS at 05:48

## 2025-03-16 RX ADMIN — METOCLOPRAMIDE 10 MG: 5 INJECTION, SOLUTION INTRAMUSCULAR; INTRAVENOUS at 05:50

## 2025-03-16 RX ADMIN — IOPAMIDOL 100 ML: 755 INJECTION, SOLUTION INTRAVENOUS at 06:15

## 2025-03-16 RX ADMIN — KETOROLAC TROMETHAMINE 15 MG: 30 INJECTION, SOLUTION INTRAMUSCULAR at 05:50

## 2025-03-16 ASSESSMENT — PAIN DESCRIPTION - ORIENTATION: ORIENTATION: MID

## 2025-03-16 ASSESSMENT — PAIN DESCRIPTION - DESCRIPTORS: DESCRIPTORS: SHARP

## 2025-03-16 ASSESSMENT — PAIN SCALES - GENERAL
PAINLEVEL_OUTOF10: 10
PAINLEVEL_OUTOF10: 0

## 2025-03-16 ASSESSMENT — PAIN - FUNCTIONAL ASSESSMENT
PAIN_FUNCTIONAL_ASSESSMENT: PREVENTS OR INTERFERES SOME ACTIVE ACTIVITIES AND ADLS
PAIN_FUNCTIONAL_ASSESSMENT: 0-10

## 2025-03-16 ASSESSMENT — PAIN DESCRIPTION - FREQUENCY: FREQUENCY: CONTINUOUS

## 2025-03-16 ASSESSMENT — PAIN DESCRIPTION - ONSET: ONSET: ON-GOING

## 2025-03-16 ASSESSMENT — PAIN DESCRIPTION - LOCATION: LOCATION: ABDOMEN

## 2025-03-16 ASSESSMENT — PAIN DESCRIPTION - PAIN TYPE: TYPE: ACUTE PAIN

## 2025-03-16 NOTE — ED PROVIDER NOTES
and nausea control as needed.  Continue to monitor    Amount and/or Complexity of Data Reviewed  Labs: ordered.  Radiology: ordered.    Risk  Prescription drug management.        REASSESSMENT     ED Course as of 03/16/25 0639   Sun Mar 16, 2025   0635 Workup benign.  No acute findings on lab work or CT scan.  Patient states she feels improved after Reglan and Toradol.  She is able to tolerate p.o. water.  Suspect peptic ulcer disease versus gastroparesis versus some unspecified gastro condition.  Will prescribe Reglan and Protonix for symptomatic treatments, recommend dietary modifications to avoid triggers, and close follow-up with GI for further care.  Stable for discharge with strict return precautions [AS]   0638 Patient does not want to wait for urine test or urine pregnancy.  States she is currently on her period and is not concerned about UTI or pregnancy at this time. [AS]      ED Course User Index  [AS] Shahid Parada MD       CONSULTS:  None    PROCEDURES:  Procedures    FINAL IMPRESSION      1. Abdominal pain, epigastric    2. Nausea and vomiting, unspecified vomiting type          DISPOSITION/PLAN   DISPOSITION Decision To Discharge 03/16/2025 06:36:46 AM    (Please note that portions of this note were completed with a voice recognition program.  Efforts were made to edit the dictations but occasionally words are mis-transcribed.)    Shahid Parada MD (electronically signed)  Emergency Attending Physician      Shahid Parada MD  03/16/25 0691

## 2025-03-16 NOTE — ED TRIAGE NOTES
Pt reports abdominal pain and vomiting that started tonight. Pt states she has been dealing with this problem off and on for a while now. Pt denies urinary symptoms or diarrhea.

## 2025-03-16 NOTE — DISCHARGE INSTRUCTIONS
Your testing in the ER was reassuring.  There was no signs of severe infection, intestinal blockage, gallstones or other medical emergencies.  Try the prescribed medications for your pain and nausea as directed.  Avoid marijuana and alcohol as sometimes these can trigger episodes of abdominal pain.  Limit spicy, greasy, or acidic foods as these can irritate your stomach.  Follow-up with the gastrointestinal specialist using number provided as soon as possible.  Return to the emergency department if your symptoms are not controlled, they change, worsen or you have any other concerns.

## 2025-03-30 ENCOUNTER — APPOINTMENT (OUTPATIENT)
Facility: HOSPITAL | Age: 30
End: 2025-03-30

## 2025-03-30 ENCOUNTER — HOSPITAL ENCOUNTER (EMERGENCY)
Facility: HOSPITAL | Age: 30
Discharge: LEFT BEFORE DISCHARGE | End: 2025-03-30
Attending: EMERGENCY MEDICINE

## 2025-03-30 VITALS
SYSTOLIC BLOOD PRESSURE: 116 MMHG | WEIGHT: 126.1 LBS | OXYGEN SATURATION: 97 % | BODY MASS INDEX: 23.81 KG/M2 | RESPIRATION RATE: 16 BRPM | HEIGHT: 61 IN | HEART RATE: 68 BPM | TEMPERATURE: 98.7 F | DIASTOLIC BLOOD PRESSURE: 78 MMHG

## 2025-03-30 DIAGNOSIS — R11.2 NAUSEA AND VOMITING, UNSPECIFIED VOMITING TYPE: ICD-10-CM

## 2025-03-30 DIAGNOSIS — R10.13 EPIGASTRIC PAIN: Primary | ICD-10-CM

## 2025-03-30 LAB
ALBUMIN SERPL-MCNC: 3.8 G/DL (ref 3.5–5)
ALBUMIN/GLOB SERPL: 1 (ref 1.1–2.2)
ALP SERPL-CCNC: 114 U/L (ref 45–117)
ALT SERPL-CCNC: 22 U/L (ref 12–78)
ANION GAP SERPL CALC-SCNC: 7 MMOL/L (ref 2–12)
APPEARANCE UR: CLEAR
AST SERPL-CCNC: 14 U/L (ref 15–37)
BACTERIA URNS QL MICRO: NEGATIVE /HPF
BASOPHILS # BLD: 0.02 K/UL (ref 0–0.1)
BASOPHILS NFR BLD: 0.2 % (ref 0–1)
BILIRUB SERPL-MCNC: 0.4 MG/DL (ref 0.2–1)
BILIRUB UR QL: NEGATIVE
BUN SERPL-MCNC: 11 MG/DL (ref 6–20)
BUN/CREAT SERPL: 15 (ref 12–20)
CALCIUM SERPL-MCNC: 9.5 MG/DL (ref 8.5–10.1)
CHLORIDE SERPL-SCNC: 105 MMOL/L (ref 97–108)
CO2 SERPL-SCNC: 27 MMOL/L (ref 21–32)
COLOR UR: NORMAL
CREAT SERPL-MCNC: 0.74 MG/DL (ref 0.55–1.02)
DIFFERENTIAL METHOD BLD: ABNORMAL
EOSINOPHIL # BLD: 0.02 K/UL (ref 0–0.4)
EOSINOPHIL NFR BLD: 0.2 % (ref 0–7)
EPITH CASTS URNS QL MICRO: NORMAL /LPF
ERYTHROCYTE [DISTWIDTH] IN BLOOD BY AUTOMATED COUNT: 12.8 % (ref 11.5–14.5)
GLOBULIN SER CALC-MCNC: 3.8 G/DL (ref 2–4)
GLUCOSE SERPL-MCNC: 106 MG/DL (ref 65–100)
GLUCOSE UR STRIP.AUTO-MCNC: NEGATIVE MG/DL
HCG, URINE, POC: NEGATIVE
HCT VFR BLD AUTO: 38.7 % (ref 35–47)
HGB BLD-MCNC: 13 G/DL (ref 11.5–16)
HGB UR QL STRIP: NEGATIVE
IMM GRANULOCYTES # BLD AUTO: 0.03 K/UL (ref 0–0.04)
IMM GRANULOCYTES NFR BLD AUTO: 0.3 % (ref 0–0.5)
KETONES UR QL STRIP.AUTO: NEGATIVE MG/DL
LEUKOCYTE ESTERASE UR QL STRIP.AUTO: NEGATIVE
LIPASE SERPL-CCNC: 24 U/L (ref 13–75)
LYMPHOCYTES # BLD: 1.2 K/UL (ref 0.8–3.5)
LYMPHOCYTES NFR BLD: 12.9 % (ref 12–49)
Lab: NORMAL
MCH RBC QN AUTO: 27.7 PG (ref 26–34)
MCHC RBC AUTO-ENTMCNC: 33.6 G/DL (ref 30–36.5)
MCV RBC AUTO: 82.5 FL (ref 80–99)
MONOCYTES # BLD: 0.56 K/UL (ref 0–1)
MONOCYTES NFR BLD: 6 % (ref 5–13)
NEGATIVE QC PASS/FAIL: NORMAL
NEUTS SEG # BLD: 7.5 K/UL (ref 1.8–8)
NEUTS SEG NFR BLD: 80.4 % (ref 32–75)
NITRITE UR QL STRIP.AUTO: NEGATIVE
NRBC # BLD: 0 K/UL (ref 0–0.01)
NRBC BLD-RTO: 0 PER 100 WBC
PH UR STRIP: 6.5 (ref 5–8)
PLATELET # BLD AUTO: 307 K/UL (ref 150–400)
PMV BLD AUTO: 10.8 FL (ref 8.9–12.9)
POSITIVE QC PASS/FAIL: NORMAL
POTASSIUM SERPL-SCNC: 3.8 MMOL/L (ref 3.5–5.1)
PROT SERPL-MCNC: 7.6 G/DL (ref 6.4–8.2)
PROT UR STRIP-MCNC: NEGATIVE MG/DL
RBC # BLD AUTO: 4.69 M/UL (ref 3.8–5.2)
RBC #/AREA URNS HPF: NORMAL /HPF (ref 0–5)
SODIUM SERPL-SCNC: 139 MMOL/L (ref 136–145)
SP GR UR REFRACTOMETRY: 1.01 (ref 1–1.03)
UROBILINOGEN UR QL STRIP.AUTO: 0.2 EU/DL (ref 0.2–1)
WBC # BLD AUTO: 9.3 K/UL (ref 3.6–11)
WBC URNS QL MICRO: NORMAL /HPF (ref 0–4)

## 2025-03-30 PROCEDURE — 36415 COLL VENOUS BLD VENIPUNCTURE: CPT

## 2025-03-30 PROCEDURE — 85025 COMPLETE CBC W/AUTO DIFF WBC: CPT

## 2025-03-30 PROCEDURE — 99284 EMERGENCY DEPT VISIT MOD MDM: CPT

## 2025-03-30 PROCEDURE — 2580000003 HC RX 258: Performed by: EMERGENCY MEDICINE

## 2025-03-30 PROCEDURE — 81001 URINALYSIS AUTO W/SCOPE: CPT

## 2025-03-30 PROCEDURE — 80053 COMPREHEN METABOLIC PANEL: CPT

## 2025-03-30 PROCEDURE — 96374 THER/PROPH/DIAG INJ IV PUSH: CPT

## 2025-03-30 PROCEDURE — 6360000002 HC RX W HCPCS: Performed by: EMERGENCY MEDICINE

## 2025-03-30 PROCEDURE — 83690 ASSAY OF LIPASE: CPT

## 2025-03-30 PROCEDURE — 6370000000 HC RX 637 (ALT 250 FOR IP): Performed by: EMERGENCY MEDICINE

## 2025-03-30 RX ORDER — 0.9 % SODIUM CHLORIDE 0.9 %
1000 INTRAVENOUS SOLUTION INTRAVENOUS ONCE
Status: COMPLETED | OUTPATIENT
Start: 2025-03-30 | End: 2025-03-30

## 2025-03-30 RX ORDER — DIPHENHYDRAMINE HYDROCHLORIDE 50 MG/ML
25 INJECTION, SOLUTION INTRAMUSCULAR; INTRAVENOUS
Status: DISCONTINUED | OUTPATIENT
Start: 2025-03-30 | End: 2025-03-30 | Stop reason: HOSPADM

## 2025-03-30 RX ORDER — ONDANSETRON 2 MG/ML
4 INJECTION INTRAMUSCULAR; INTRAVENOUS ONCE
Status: COMPLETED | OUTPATIENT
Start: 2025-03-30 | End: 2025-03-30

## 2025-03-30 RX ORDER — PROCHLORPERAZINE EDISYLATE 5 MG/ML
10 INJECTION INTRAMUSCULAR; INTRAVENOUS ONCE
Status: DISCONTINUED | OUTPATIENT
Start: 2025-03-30 | End: 2025-03-30 | Stop reason: HOSPADM

## 2025-03-30 RX ADMIN — LIDOCAINE HYDROCHLORIDE 40 ML: 20 SOLUTION ORAL at 09:47

## 2025-03-30 RX ADMIN — ONDANSETRON 4 MG: 2 INJECTION, SOLUTION INTRAMUSCULAR; INTRAVENOUS at 09:41

## 2025-03-30 RX ADMIN — SODIUM CHLORIDE 1000 ML: 9 INJECTION, SOLUTION INTRAVENOUS at 09:41

## 2025-03-30 ASSESSMENT — PAIN SCALES - GENERAL: PAINLEVEL_OUTOF10: 10

## 2025-03-30 ASSESSMENT — PAIN DESCRIPTION - ORIENTATION: ORIENTATION: ANTERIOR;MID

## 2025-03-30 ASSESSMENT — PAIN DESCRIPTION - LOCATION: LOCATION: ABDOMEN

## 2025-03-30 ASSESSMENT — PAIN DESCRIPTION - DESCRIPTORS: DESCRIPTORS: SHARP

## 2025-03-30 ASSESSMENT — PAIN - FUNCTIONAL ASSESSMENT: PAIN_FUNCTIONAL_ASSESSMENT: 0-10

## 2025-03-30 NOTE — ED NOTES
Pt returned to ED from radiology without completing Xray. Per Xray tech, patient stated she felt nauseated and refused imaging. Provider notified. Plan to give additional medication for nausea. Pt notified of plan of care and agreeable.

## 2025-03-30 NOTE — ED PROVIDER NOTES
[place] [facility] EMERGENCY DEPARTMENT  EMERGENCY DEPARTMENT ENCOUNTER      Pt Name: Mare Drummond  MRN: [id number]  Birthdate 1995  Date of evaluation: 3/30/2025  Provider: Yuriy Kemp MD    CHIEF COMPLAINT       Chief Complaint   Patient presents with    Abdominal Pain    Nausea    Vomiting         HISTORY OF PRESENT ILLNESS   (Location/Symptom, Timing/Onset, Context/Setting, Quality, Duration, Modifying Factors, Severity)  Note limiting factors.   Healthy 30-year-old.  She is status post appendectomy.  She presents with complaints of epigastric abdominal pain, nausea, vomiting.  She states that her symptoms began about 3 months ago.  She describes the pain as intermittent.  It tends to occur several times per day.  The discomfort lasts for minutes.  She describes it as sharp.  It does not radiate.  She states that she tends to vomit \"after every time I eat.\"  She is unsure about any weight loss.  She denies blood in her stool or black stools.  She denies urinary symptoms.  Her last bowel movement was 2 days ago.  Epic records were reviewed.  This is her third ED visit this month for similar symptoms.  She has had 2 extensive evaluations including blood work, plain films, CT abdomen.  Her CT showed no acute process.  She has an appointment with a GI specialist in about a week.  She has tried MiraLAX, Protonix, and Reglan with limited relief.  She states that the pain seems to occur randomly.  She has not noticed any change as she has changed to a more bland diet.          Review of External Medical Records:     Nursing Notes were reviewed.    REVIEW OF SYSTEMS    (2-9 systems for level 4, 10 or more for level 5)     Review of Systems    Except as noted above the remainder of the review of systems was reviewed and negative.       PAST MEDICAL HISTORY     Past Medical History:   Diagnosis Date    Abnormal Pap smear of cervix 10/25/2018    LGSIL pap 10/25/2018;1/2020    Encounter for IUD insertion

## 2025-03-30 NOTE — ED NOTES
Went to give patient additional medication for nausea. Pt declined medications, stating \"I'm just going to go somewhere else\". IV removed at patient's request. Offered to have physician speak with patient. Patient declined. Pt ambulatory out of ER with steady gait.

## 2025-03-30 NOTE — ED TRIAGE NOTES
Pt ambulated to treatment area with a steady gait. Pt here returns with continuing abdominal pain associated with N/V. Has GI appt on April 7th.

## 2025-03-30 NOTE — ED NOTES
Pt declined drinking all of GI cocktail. Medication teaching provided. Pt declines to take remaining medication at this time.

## 2025-05-04 ENCOUNTER — HOSPITAL ENCOUNTER (EMERGENCY)
Facility: HOSPITAL | Age: 30
Discharge: HOME | End: 2025-05-04
Attending: EMERGENCY MEDICINE

## 2025-05-04 VITALS
BODY MASS INDEX: 22.66 KG/M2 | DIASTOLIC BLOOD PRESSURE: 45 MMHG | RESPIRATION RATE: 15 BRPM | OXYGEN SATURATION: 100 % | WEIGHT: 120 LBS | SYSTOLIC BLOOD PRESSURE: 96 MMHG | HEART RATE: 69 BPM | TEMPERATURE: 98.2 F | HEIGHT: 61 IN

## 2025-05-04 DIAGNOSIS — Z3A.01 LESS THAN 8 WEEKS GESTATION OF PREGNANCY: ICD-10-CM

## 2025-05-04 DIAGNOSIS — R11.2 NAUSEA AND VOMITING, UNSPECIFIED VOMITING TYPE: Primary | ICD-10-CM

## 2025-05-04 LAB
ALBUMIN SERPL-MCNC: 3.6 G/DL (ref 3.5–5)
ALBUMIN/GLOB SERPL: 1.1 (ref 1.1–2.2)
ALP SERPL-CCNC: 82 U/L (ref 45–117)
ALT SERPL-CCNC: 28 U/L (ref 12–78)
ANION GAP SERPL CALC-SCNC: 7 MMOL/L (ref 2–12)
AST SERPL-CCNC: 10 U/L (ref 15–37)
BASOPHILS # BLD: 0.02 K/UL (ref 0–0.1)
BASOPHILS NFR BLD: 0.2 % (ref 0–1)
BILIRUB SERPL-MCNC: 0.4 MG/DL (ref 0.2–1)
BUN SERPL-MCNC: 10 MG/DL (ref 6–20)
BUN/CREAT SERPL: 19 (ref 12–20)
CALCIUM SERPL-MCNC: 9.2 MG/DL (ref 8.5–10.1)
CHLORIDE SERPL-SCNC: 109 MMOL/L (ref 97–108)
CO2 SERPL-SCNC: 23 MMOL/L (ref 21–32)
CREAT SERPL-MCNC: 0.53 MG/DL (ref 0.55–1.02)
DIFFERENTIAL METHOD BLD: NORMAL
EOSINOPHIL # BLD: 0.05 K/UL (ref 0–0.4)
EOSINOPHIL NFR BLD: 0.5 % (ref 0–7)
ERYTHROCYTE [DISTWIDTH] IN BLOOD BY AUTOMATED COUNT: 12.7 % (ref 11.5–14.5)
GLOBULIN SER CALC-MCNC: 3.4 G/DL (ref 2–4)
GLUCOSE SERPL-MCNC: 97 MG/DL (ref 65–100)
HCG SERPL-ACNC: ABNORMAL MIU/ML (ref 0–6)
HCT VFR BLD AUTO: 35.7 % (ref 35–47)
HGB BLD-MCNC: 12 G/DL (ref 11.5–16)
IMM GRANULOCYTES # BLD AUTO: 0.04 K/UL (ref 0–0.04)
IMM GRANULOCYTES NFR BLD AUTO: 0.4 % (ref 0–0.5)
LYMPHOCYTES # BLD: 2.13 K/UL (ref 0.8–3.5)
LYMPHOCYTES NFR BLD: 19.9 % (ref 12–49)
MCH RBC QN AUTO: 28.1 PG (ref 26–34)
MCHC RBC AUTO-ENTMCNC: 33.6 G/DL (ref 30–36.5)
MCV RBC AUTO: 83.6 FL (ref 80–99)
MONOCYTES # BLD: 0.77 K/UL (ref 0–1)
MONOCYTES NFR BLD: 7.2 % (ref 5–13)
NEUTS SEG # BLD: 7.68 K/UL (ref 1.8–8)
NEUTS SEG NFR BLD: 71.8 % (ref 32–75)
NRBC # BLD: 0 K/UL (ref 0–0.01)
NRBC BLD-RTO: 0 PER 100 WBC
PLATELET # BLD AUTO: 254 K/UL (ref 150–400)
PMV BLD AUTO: 10.8 FL (ref 8.9–12.9)
POTASSIUM SERPL-SCNC: 3.4 MMOL/L (ref 3.5–5.1)
PROT SERPL-MCNC: 7 G/DL (ref 6.4–8.2)
RBC # BLD AUTO: 4.27 M/UL (ref 3.8–5.2)
SODIUM SERPL-SCNC: 139 MMOL/L (ref 136–145)
WBC # BLD AUTO: 10.7 K/UL (ref 3.6–11)

## 2025-05-04 PROCEDURE — 99284 EMERGENCY DEPT VISIT MOD MDM: CPT

## 2025-05-04 PROCEDURE — 80053 COMPREHEN METABOLIC PANEL: CPT

## 2025-05-04 PROCEDURE — 2580000003 HC RX 258: Performed by: EMERGENCY MEDICINE

## 2025-05-04 PROCEDURE — 6360000002 HC RX W HCPCS: Performed by: EMERGENCY MEDICINE

## 2025-05-04 PROCEDURE — 85025 COMPLETE CBC W/AUTO DIFF WBC: CPT

## 2025-05-04 PROCEDURE — 36415 COLL VENOUS BLD VENIPUNCTURE: CPT

## 2025-05-04 PROCEDURE — 96374 THER/PROPH/DIAG INJ IV PUSH: CPT

## 2025-05-04 PROCEDURE — 84702 CHORIONIC GONADOTROPIN TEST: CPT

## 2025-05-04 RX ORDER — ONDANSETRON 4 MG/1
4 TABLET, ORALLY DISINTEGRATING ORAL 3 TIMES DAILY PRN
Qty: 21 TABLET | Refills: 0 | Status: SHIPPED | OUTPATIENT
Start: 2025-05-04

## 2025-05-04 RX ORDER — 0.9 % SODIUM CHLORIDE 0.9 %
1000 INTRAVENOUS SOLUTION INTRAVENOUS ONCE
Status: COMPLETED | OUTPATIENT
Start: 2025-05-04 | End: 2025-05-04

## 2025-05-04 RX ORDER — ONDANSETRON 2 MG/ML
4 INJECTION INTRAMUSCULAR; INTRAVENOUS ONCE
Status: COMPLETED | OUTPATIENT
Start: 2025-05-04 | End: 2025-05-04

## 2025-05-04 RX ADMIN — ONDANSETRON 4 MG: 2 INJECTION, SOLUTION INTRAMUSCULAR; INTRAVENOUS at 03:15

## 2025-05-04 RX ADMIN — SODIUM CHLORIDE 1000 ML: 0.9 INJECTION, SOLUTION INTRAVENOUS at 03:15

## 2025-05-04 ASSESSMENT — PAIN - FUNCTIONAL ASSESSMENT: PAIN_FUNCTIONAL_ASSESSMENT: NONE - DENIES PAIN

## 2025-05-04 NOTE — ED PROVIDER NOTES
[facility] EMERGENCY DEPARTMENT  EMERGENCY DEPARTMENT ENCOUNTER      Pt Name: Mare Drummond  MRN: [id number]  Birthdate 1995  Date of evaluation: 5/4/2025  Provider: Daljit Estrella MD    CHIEF COMPLAINT       Chief Complaint   Patient presents with    Vomiting         HISTORY OF PRESENT ILLNESS   (Location/Symptom, Timing/Onset, Context/Setting, Quality, Duration, Modifying Factors, Severity)  Note limiting factors.   23 female with nausea vomiting for 2 to 3 weeks.  She is currently 7 weeks pregnant.  No bleeding.  No history of significant abdominal surgeries.    The history is provided by the patient.   Nausea & Vomiting  Severity:  Moderate  Duration:  3 days  Timing:  Constant  Quality:  Undigested food  Progression:  Unchanged  Chronicity:  Recurrent  Recent urination:  Normal  Worsened by:  Nothing  Ineffective treatments:  None tried  Associated symptoms: no abdominal pain, no arthralgias, no chills, no cough, no diarrhea, no headaches, no myalgias and no sore throat    Risk factors: no alcohol use, no diabetes and not pregnant    Risk factors comment:  Pregnant 7 weeks        Review of External Medical Records:     Nursing Notes were reviewed.    REVIEW OF SYSTEMS    (2-9 systems for level 4, 10 or more for level 5)     Review of Systems   Constitutional:  Negative for activity change, appetite change, chills and diaphoresis.   HENT:  Negative for congestion, ear pain, facial swelling and sore throat.    Eyes:  Negative for pain, discharge and visual disturbance.   Respiratory:  Negative for cough, chest tightness and shortness of breath.    Cardiovascular:  Negative for chest pain and palpitations.   Gastrointestinal:  Positive for nausea. Negative for abdominal pain, diarrhea and vomiting.   Endocrine: Negative for cold intolerance, heat intolerance, polydipsia and polyphagia.   Genitourinary:  Negative for difficulty urinating, dysuria, frequency, hematuria, urgency and

## 2025-05-04 NOTE — ED TRIAGE NOTES
Nausea and vomiting for 2- 3 weeks. Worse for several days with dizziness. Denies diarrhea and abdominal pain. Patient is 7 weeks gestation

## 2025-05-06 ENCOUNTER — HOSPITAL ENCOUNTER (EMERGENCY)
Facility: HOSPITAL | Age: 30
Discharge: HOME | End: 2025-05-06
Attending: EMERGENCY MEDICINE

## 2025-05-06 VITALS
TEMPERATURE: 98.4 F | OXYGEN SATURATION: 100 % | BODY MASS INDEX: 22.66 KG/M2 | RESPIRATION RATE: 18 BRPM | DIASTOLIC BLOOD PRESSURE: 52 MMHG | HEIGHT: 61 IN | HEART RATE: 60 BPM | SYSTOLIC BLOOD PRESSURE: 102 MMHG | WEIGHT: 120 LBS

## 2025-05-06 DIAGNOSIS — O21.9 NAUSEA AND VOMITING IN PREGNANCY: ICD-10-CM

## 2025-05-06 DIAGNOSIS — R11.2 NAUSEA AND VOMITING, UNSPECIFIED VOMITING TYPE: Primary | ICD-10-CM

## 2025-05-06 LAB
ALBUMIN SERPL-MCNC: 3.7 G/DL (ref 3.5–5)
ALBUMIN/GLOB SERPL: 1.1 (ref 1.1–2.2)
ALP SERPL-CCNC: 78 U/L (ref 45–117)
ALT SERPL-CCNC: 31 U/L (ref 12–78)
ANION GAP SERPL CALC-SCNC: 8 MMOL/L (ref 2–12)
AST SERPL-CCNC: 16 U/L (ref 15–37)
BASOPHILS # BLD: 0.02 K/UL (ref 0–0.1)
BASOPHILS NFR BLD: 0.2 % (ref 0–1)
BILIRUB SERPL-MCNC: 0.4 MG/DL (ref 0.2–1)
BUN SERPL-MCNC: 8 MG/DL (ref 6–20)
BUN/CREAT SERPL: 14 (ref 12–20)
CALCIUM SERPL-MCNC: 9.3 MG/DL (ref 8.5–10.1)
CHLORIDE SERPL-SCNC: 105 MMOL/L (ref 97–108)
CO2 SERPL-SCNC: 24 MMOL/L (ref 21–32)
CREAT SERPL-MCNC: 0.59 MG/DL (ref 0.55–1.02)
DIFFERENTIAL METHOD BLD: ABNORMAL
EOSINOPHIL # BLD: 0.04 K/UL (ref 0–0.4)
EOSINOPHIL NFR BLD: 0.4 % (ref 0–7)
ERYTHROCYTE [DISTWIDTH] IN BLOOD BY AUTOMATED COUNT: 12.6 % (ref 11.5–14.5)
GLOBULIN SER CALC-MCNC: 3.5 G/DL (ref 2–4)
GLUCOSE SERPL-MCNC: 79 MG/DL (ref 65–100)
HCT VFR BLD AUTO: 35.9 % (ref 35–47)
HGB BLD-MCNC: 12 G/DL (ref 11.5–16)
IMM GRANULOCYTES # BLD AUTO: 0.04 K/UL (ref 0–0.04)
IMM GRANULOCYTES NFR BLD AUTO: 0.4 % (ref 0–0.5)
LIPASE SERPL-CCNC: 21 U/L (ref 13–75)
LYMPHOCYTES # BLD: 2.2 K/UL (ref 0.8–3.5)
LYMPHOCYTES NFR BLD: 19.8 % (ref 12–49)
MAGNESIUM SERPL-MCNC: 2 MG/DL (ref 1.6–2.4)
MCH RBC QN AUTO: 27.7 PG (ref 26–34)
MCHC RBC AUTO-ENTMCNC: 33.4 G/DL (ref 30–36.5)
MCV RBC AUTO: 82.9 FL (ref 80–99)
MONOCYTES # BLD: 0.78 K/UL (ref 0–1)
MONOCYTES NFR BLD: 7 % (ref 5–13)
NEUTS SEG # BLD: 8.03 K/UL (ref 1.8–8)
NEUTS SEG NFR BLD: 72.2 % (ref 32–75)
NRBC # BLD: 0 K/UL (ref 0–0.01)
NRBC BLD-RTO: 0 PER 100 WBC
PLATELET # BLD AUTO: 270 K/UL (ref 150–400)
PMV BLD AUTO: 11.3 FL (ref 8.9–12.9)
POTASSIUM SERPL-SCNC: 3.7 MMOL/L (ref 3.5–5.1)
PROT SERPL-MCNC: 7.2 G/DL (ref 6.4–8.2)
RBC # BLD AUTO: 4.33 M/UL (ref 3.8–5.2)
SODIUM SERPL-SCNC: 137 MMOL/L (ref 136–145)
WBC # BLD AUTO: 11.1 K/UL (ref 3.6–11)

## 2025-05-06 PROCEDURE — 83690 ASSAY OF LIPASE: CPT

## 2025-05-06 PROCEDURE — 96374 THER/PROPH/DIAG INJ IV PUSH: CPT

## 2025-05-06 PROCEDURE — 99284 EMERGENCY DEPT VISIT MOD MDM: CPT

## 2025-05-06 PROCEDURE — 85025 COMPLETE CBC W/AUTO DIFF WBC: CPT

## 2025-05-06 PROCEDURE — 83735 ASSAY OF MAGNESIUM: CPT

## 2025-05-06 PROCEDURE — 6360000002 HC RX W HCPCS: Performed by: PHYSICIAN ASSISTANT

## 2025-05-06 PROCEDURE — 36415 COLL VENOUS BLD VENIPUNCTURE: CPT

## 2025-05-06 PROCEDURE — 80053 COMPREHEN METABOLIC PANEL: CPT

## 2025-05-06 PROCEDURE — 2580000003 HC RX 258: Performed by: PHYSICIAN ASSISTANT

## 2025-05-06 RX ORDER — DIPHENHYDRAMINE HYDROCHLORIDE 50 MG/ML
25 INJECTION, SOLUTION INTRAMUSCULAR; INTRAVENOUS
Status: COMPLETED | OUTPATIENT
Start: 2025-05-06 | End: 2025-05-06

## 2025-05-06 RX ORDER — 0.9 % SODIUM CHLORIDE 0.9 %
1000 INTRAVENOUS SOLUTION INTRAVENOUS ONCE
Status: COMPLETED | OUTPATIENT
Start: 2025-05-06 | End: 2025-05-06

## 2025-05-06 RX ADMIN — DIPHENHYDRAMINE HYDROCHLORIDE 25 MG: 50 INJECTION INTRAMUSCULAR; INTRAVENOUS at 21:22

## 2025-05-06 RX ADMIN — SODIUM CHLORIDE 1000 ML: 0.9 INJECTION, SOLUTION INTRAVENOUS at 21:06

## 2025-05-06 ASSESSMENT — PAIN - FUNCTIONAL ASSESSMENT: PAIN_FUNCTIONAL_ASSESSMENT: 0-10

## 2025-05-06 ASSESSMENT — PAIN SCALES - GENERAL: PAINLEVEL_OUTOF10: 0

## 2025-05-07 NOTE — DISCHARGE INSTRUCTIONS
Return to the emergency department for new or worsening nausea, vomiting, abdominal pain, vaginal bleeding or discharge, inability to tolerate p.o., lightheadedness, loss of consciousness.

## 2025-05-07 NOTE — ED PROVIDER NOTES
Patient declined           PHYSICAL EXAM    (up to 7 for level 4, 8 or more for level 5)     ED Triage Vitals [05/06/25 2045]   BP Systolic BP Percentile Diastolic BP Percentile Temp Temp Source Pulse Respirations SpO2   (!) 115/59 -- -- 98.4 °F (36.9 °C) Oral 68 17 100 %      Height Weight - Scale         1.549 m (5' 1\") 54.4 kg (120 lb)             Body mass index is 22.67 kg/m².    Physical Exam  Constitutional:       Appearance: Normal appearance.   HENT:      Head: Normocephalic and atraumatic.      Mouth/Throat:      Mouth: Mucous membranes are moist.   Eyes:      Extraocular Movements: Extraocular movements intact.      Conjunctiva/sclera: Conjunctivae normal.   Cardiovascular:      Rate and Rhythm: Normal rate.      Pulses: Normal pulses.      Heart sounds: Normal heart sounds.   Pulmonary:      Effort: Pulmonary effort is normal.      Breath sounds: Normal breath sounds.   Abdominal:      General: Abdomen is flat.      Palpations: Abdomen is soft.      Tenderness: There is no abdominal tenderness.   Skin:     General: Skin is warm and dry.   Neurological:      Mental Status: She is alert. Mental status is at baseline.   Psychiatric:         Mood and Affect: Mood normal.         Behavior: Behavior normal.         DIAGNOSTIC RESULTS     EKG: All EKG's are interpreted by the Emergency Department Physician who either signs or Co-signs this chart in the absence of a cardiologist.        RADIOLOGY:   Non-plain film images such as CT, Ultrasound and MRI are read by the radiologist. Plain radiographic images are visualized and preliminarily interpreted by the emergency physician with the below findings:        Interpretation per the Radiologist below, if available at the time of this note:    No orders to display        LABS:  Labs Reviewed   CBC WITH AUTO DIFFERENTIAL - Abnormal; Notable for the following components:       Result Value    WBC 11.1 (*)     Neutrophils Absolute 8.03 (*)     All other components

## 2025-05-07 NOTE — ED TRIAGE NOTES
Pt ambulatory to triage c/o nausea and vomiting since she found out she was pregnant (7 weeks along).     Last zofran taken at 4pm.    No other symptoms/complaints

## 2025-05-09 DIAGNOSIS — Z34.90 PREGNANCY, UNSPECIFIED GESTATIONAL AGE: Primary | ICD-10-CM

## 2025-05-09 RX ORDER — ONDANSETRON 8 MG/1
8 TABLET, ORALLY DISINTEGRATING ORAL EVERY 8 HOURS PRN
Qty: 30 TABLET | Refills: 5 | Status: SHIPPED | OUTPATIENT
Start: 2025-05-09

## 2025-05-09 RX ORDER — DOXYLAMINE SUCCINATE AND PYRIDOXINE HYDROCHLORIDE, DELAYED RELEASE TABLETS 10 MG/10 MG 10; 10 MG/1; MG/1
2 TABLET, DELAYED RELEASE ORAL NIGHTLY
Qty: 120 TABLET | Refills: 3 | Status: SHIPPED | OUTPATIENT
Start: 2025-05-09

## 2025-05-12 ENCOUNTER — ROUTINE PRENATAL (OUTPATIENT)
Age: 30
End: 2025-05-12

## 2025-05-12 VITALS
HEART RATE: 64 BPM | WEIGHT: 119 LBS | BODY MASS INDEX: 22.47 KG/M2 | DIASTOLIC BLOOD PRESSURE: 64 MMHG | HEIGHT: 61 IN | SYSTOLIC BLOOD PRESSURE: 100 MMHG

## 2025-05-12 DIAGNOSIS — Z3A.08 8 WEEKS GESTATION OF PREGNANCY: ICD-10-CM

## 2025-05-12 DIAGNOSIS — O09.90 SUPERVISION OF HIGH RISK PREGNANCY, ANTEPARTUM: Primary | ICD-10-CM

## 2025-05-12 DIAGNOSIS — Z87.51 HISTORY OF PRETERM DELIVERY: ICD-10-CM

## 2025-05-12 PROBLEM — Z30.430 ENCOUNTER FOR IUD INSERTION: Status: RESOLVED | Noted: 2021-06-25 | Resolved: 2025-05-12

## 2025-05-12 PROCEDURE — 0501F PRENATAL FLOW SHEET: CPT | Performed by: OBSTETRICS & GYNECOLOGY

## 2025-05-12 RX ORDER — PNV NO.95/FERROUS FUM/FOLIC AC 28MG-0.8MG
1 TABLET ORAL DAILY
Qty: 90 TABLET | Refills: 5 | Status: SHIPPED | OUTPATIENT
Start: 2025-05-12

## 2025-05-12 SDOH — ECONOMIC STABILITY: INCOME INSECURITY: IN THE LAST 12 MONTHS, WAS THERE A TIME WHEN YOU WERE NOT ABLE TO PAY THE MORTGAGE OR RENT ON TIME?: YES

## 2025-05-12 SDOH — ECONOMIC STABILITY: FOOD INSECURITY: WITHIN THE PAST 12 MONTHS, YOU WORRIED THAT YOUR FOOD WOULD RUN OUT BEFORE YOU GOT MONEY TO BUY MORE.: PATIENT DECLINED

## 2025-05-12 SDOH — ECONOMIC STABILITY: FOOD INSECURITY: WITHIN THE PAST 12 MONTHS, THE FOOD YOU BOUGHT JUST DIDN'T LAST AND YOU DIDN'T HAVE MONEY TO GET MORE.: PATIENT DECLINED

## 2025-05-12 SDOH — ECONOMIC STABILITY: TRANSPORTATION INSECURITY
IN THE PAST 12 MONTHS, HAS THE LACK OF TRANSPORTATION KEPT YOU FROM MEDICAL APPOINTMENTS OR FROM GETTING MEDICATIONS?: NO

## 2025-05-12 SDOH — ECONOMIC STABILITY: TRANSPORTATION INSECURITY
IN THE PAST 12 MONTHS, HAS LACK OF TRANSPORTATION KEPT YOU FROM MEETINGS, WORK, OR FROM GETTING THINGS NEEDED FOR DAILY LIVING?: NO

## 2025-05-12 ASSESSMENT — PATIENT HEALTH QUESTIONNAIRE - PHQ9
SUM OF ALL RESPONSES TO PHQ QUESTIONS 1-9: 0
2. FEELING DOWN, DEPRESSED OR HOPELESS: NOT AT ALL
1. LITTLE INTEREST OR PLEASURE IN DOING THINGS: NOT AT ALL
SUM OF ALL RESPONSES TO PHQ QUESTIONS 1-9: 0
2. FEELING DOWN, DEPRESSED OR HOPELESS: NOT AT ALL
1. LITTLE INTEREST OR PLEASURE IN DOING THINGS: NOT AT ALL
SUM OF ALL RESPONSES TO PHQ QUESTIONS 1-9: 0
SUM OF ALL RESPONSES TO PHQ QUESTIONS 1-9: 0
SUM OF ALL RESPONSES TO PHQ9 QUESTIONS 1 & 2: 0

## 2025-05-12 NOTE — PROGRESS NOTES
Current pregnancy history:    Mare Drummond is a ,  30 y.o. female  / [place] Patient's last menstrual period was 2025..  She presents for the evaluation of amenorrhea and a positive pregnancy test.    Per nursing Note:  Patient's last menstrual period was 2025.     Last Pap: 23,Normal  LMP history:  The date of her LMP is  certain.  Her last menstrual period was normal and lasted for 4 to 5 days.  A urine pregnancy test was positive 4 weeks ago. She was not on the pill at conception.      Based on her LMP, her EDC is 25 and her EGA is 8 weeks,0 days. Her menstrual cycles are regular and occur approximately every 28 days and range from 3 to 5 days. The last menses lasted  the usual number of days.      Ultrasound data:  She had an ultrasound done by the ultrasound tech today which revealed a viable layton pregnancy with a gestational age of 8 weeks and 0 days giving an EDC of 25.     Pregnancy symptoms:     Since her LMP she has experienced urinary frequency, breast tenderness, and nausea.   She has been vomiting over the last few weeks.  Associated signs and symptoms which she denies: dysuria, discharge, vaginal bleeding.     She states she has gained weight:  Approximately 5 pounds over the last few weeks.     Relevant past pregnancy history:              She has the following pregnancy history:                She has history of  delivery.     Relevant past medical history:(relevant to this pregnancy): noncontributory.       Her occupation is:  assistant     Substance history: negative for alcohol, tobacco and street drugs.           Positive for nothing.  Exposure history: There is/are no indoor cat/s in the home.  She denies close contact with children on a regular basis.   She has had chicken pox or the vaccine in the past.   Patient denies issues with domestic violence.     Genetic Screening/Teratology Counseling: (Includes

## 2025-05-12 NOTE — PROGRESS NOTES
Mare Drummond is a 30 y.o. female presents for a new pregnancy visit.    Chief Complaint   Patient presents with    Initial Prenatal Visit       Problems: Amenorrhea     Patient's last menstrual period was 2025.    Last Pap: 23,Normal  LMP history:  The date of her LMP is  certain.  Her last menstrual period was normal and lasted for 4 to 5 days.  A urine pregnancy test was positive 4 weeks ago. She was not on the pill at conception.     Based on her LMP, her EDC is 25 and her EGA is 8 weeks,0 days. Her menstrual cycles are regular and occur approximately every 28 days and range from 3 to 5 days. The last menses lasted  the usual number of days.      Ultrasound data:  She had an ultrasound done by the ultrasound tech today which revealed a viable layton pregnancy with a gestational age of 8 weeks and 0 days giving an EDC of 25.    Pregnancy symptoms:    Since her LMP she has experienced urinary frequency, breast tenderness, and nausea.   She has been vomiting over the last few weeks.  Associated signs and symptoms which she denies: dysuria, discharge, vaginal bleeding.    She states she has gained weight:  Approximately 5 pounds over the last few weeks.    Relevant past pregnancy history:   She has the following pregnancy history:     She has history of  delivery.    Relevant past medical history:(relevant to this pregnancy): noncontributory.      Her occupation is:  assistant     1. Have you been to the ER, urgent care clinic, or hospitalized since your last visit? No    2. Have you seen or consulted any other health care providers outside of the [facility] since your last visit? No    Examination chaperoned by Chelly Jordan LPN.

## 2025-05-13 ENCOUNTER — RESULTS FOLLOW-UP (OUTPATIENT)
Age: 30
End: 2025-05-13

## 2025-05-13 DIAGNOSIS — O09.90 SUPERVISION OF HIGH RISK PREGNANCY, ANTEPARTUM: Primary | ICD-10-CM

## 2025-05-13 LAB
ABO GROUP BLD: NORMAL
BLD GP AB SCN SERPL QL: NEGATIVE
ERYTHROCYTE [DISTWIDTH] IN BLOOD BY AUTOMATED COUNT: 12.9 % (ref 11.7–15.4)
HBV SURFACE AG SERPL QL IA: NEGATIVE
HCT VFR BLD AUTO: 39.4 % (ref 34–46.6)
HCV IGG SERPL QL IA: NON REACTIVE
HGB BLD-MCNC: 12.6 G/DL (ref 11.1–15.9)
HIV 1+2 AB+HIV1 P24 AG SERPL QL IA: NON REACTIVE
MCH RBC QN AUTO: 28.4 PG (ref 26.6–33)
MCHC RBC AUTO-ENTMCNC: 32 G/DL (ref 31.5–35.7)
MCV RBC AUTO: 89 FL (ref 79–97)
MEV IGG SER IA-ACNC: <13.5 AU/ML
PLATELET # BLD AUTO: 305 X10E3/UL (ref 150–450)
RBC # BLD AUTO: 4.44 X10E6/UL (ref 3.77–5.28)
RH BLD: POSITIVE
VZV IGG SER QL IA: NON REACTIVE
WBC # BLD AUTO: 10.4 X10E3/UL (ref 3.4–10.8)

## 2025-05-14 LAB
BACTERIA UR CULT: NO GROWTH
RUBV IGG SERPL IA-ACNC: 5.89 INDEX
TREPONEMA PALLIDUM IGG+IGM AB [PRESENCE] IN SERUM OR PLASMA BY IMMUNOASSAY: NON REACTIVE

## 2025-06-08 ENCOUNTER — HOSPITAL ENCOUNTER (EMERGENCY)
Facility: HOSPITAL | Age: 30
Discharge: HOME | End: 2025-06-08
Attending: EMERGENCY MEDICINE

## 2025-06-08 ENCOUNTER — APPOINTMENT (OUTPATIENT)
Facility: HOSPITAL | Age: 30
End: 2025-06-08

## 2025-06-08 VITALS
SYSTOLIC BLOOD PRESSURE: 107 MMHG | BODY MASS INDEX: 22.66 KG/M2 | OXYGEN SATURATION: 100 % | DIASTOLIC BLOOD PRESSURE: 61 MMHG | RESPIRATION RATE: 16 BRPM | TEMPERATURE: 98.6 F | HEIGHT: 61 IN | WEIGHT: 120 LBS | HEART RATE: 97 BPM

## 2025-06-08 DIAGNOSIS — N93.9 VAGINAL BLEEDING: ICD-10-CM

## 2025-06-08 DIAGNOSIS — Z3A.12 12 WEEKS GESTATION OF PREGNANCY: Primary | ICD-10-CM

## 2025-06-08 LAB
ABO + RH BLD: NORMAL
ALBUMIN SERPL-MCNC: 3.8 G/DL (ref 3.5–5)
ALBUMIN/GLOB SERPL: 0.9 (ref 1.1–2.2)
ALP SERPL-CCNC: 97 U/L (ref 45–117)
ALT SERPL-CCNC: 23 U/L (ref 12–78)
ANION GAP SERPL CALC-SCNC: 9 MMOL/L (ref 2–12)
APPEARANCE UR: ABNORMAL
APPEARANCE UR: ABNORMAL
AST SERPL-CCNC: 16 U/L (ref 15–37)
BACTERIA URNS QL MICRO: ABNORMAL /HPF
BACTERIA URNS QL MICRO: NEGATIVE /HPF
BASOPHILS # BLD: 0.02 K/UL (ref 0–0.1)
BASOPHILS NFR BLD: 0.2 % (ref 0–1)
BILIRUB SERPL-MCNC: 0.2 MG/DL (ref 0.2–1)
BILIRUB UR QL CFM: ABNORMAL
BILIRUB UR QL: NEGATIVE
BLOOD GROUP ANTIBODIES SERPL: NORMAL
BUN SERPL-MCNC: 8 MG/DL (ref 6–20)
BUN/CREAT SERPL: 14 (ref 12–20)
CALCIUM SERPL-MCNC: 10.1 MG/DL (ref 8.5–10.1)
CHLORIDE SERPL-SCNC: 106 MMOL/L (ref 97–108)
CO2 SERPL-SCNC: 23 MMOL/L (ref 21–32)
COLOR UR: ABNORMAL
COLOR UR: ABNORMAL
CREAT SERPL-MCNC: 0.58 MG/DL (ref 0.55–1.02)
DIFFERENTIAL METHOD BLD: ABNORMAL
EOSINOPHIL # BLD: 0.05 K/UL (ref 0–0.4)
EOSINOPHIL NFR BLD: 0.4 % (ref 0–7)
EPITH CASTS URNS QL MICRO: ABNORMAL /LPF
EPITH CASTS URNS QL MICRO: ABNORMAL /LPF
ERYTHROCYTE [DISTWIDTH] IN BLOOD BY AUTOMATED COUNT: 13.1 % (ref 11.5–14.5)
GLOBULIN SER CALC-MCNC: 4.1 G/DL (ref 2–4)
GLUCOSE SERPL-MCNC: 79 MG/DL (ref 65–100)
GLUCOSE UR STRIP.AUTO-MCNC: NEGATIVE MG/DL
GLUCOSE UR STRIP.AUTO-MCNC: NEGATIVE MG/DL
HCG SERPL-ACNC: ABNORMAL MIU/ML (ref 0–6)
HCT VFR BLD AUTO: 37.2 % (ref 35–47)
HGB BLD-MCNC: 12.7 G/DL (ref 11.5–16)
HGB UR QL STRIP: ABNORMAL
HGB UR QL STRIP: ABNORMAL
IMM GRANULOCYTES # BLD AUTO: 0.03 K/UL (ref 0–0.04)
IMM GRANULOCYTES NFR BLD AUTO: 0.3 % (ref 0–0.5)
KETONES UR QL STRIP.AUTO: 15 MG/DL
KETONES UR QL STRIP.AUTO: NEGATIVE MG/DL
LEUKOCYTE ESTERASE UR QL STRIP.AUTO: ABNORMAL
LEUKOCYTE ESTERASE UR QL STRIP.AUTO: ABNORMAL
LYMPHOCYTES # BLD: 2 K/UL (ref 0.8–3.5)
LYMPHOCYTES NFR BLD: 16.8 % (ref 12–49)
MCH RBC QN AUTO: 28.2 PG (ref 26–34)
MCHC RBC AUTO-ENTMCNC: 34.1 G/DL (ref 30–36.5)
MCV RBC AUTO: 82.7 FL (ref 80–99)
MONOCYTES # BLD: 0.8 K/UL (ref 0–1)
MONOCYTES NFR BLD: 6.7 % (ref 5–13)
NEUTS SEG # BLD: 8.99 K/UL (ref 1.8–8)
NEUTS SEG NFR BLD: 75.6 % (ref 32–75)
NITRITE UR QL STRIP.AUTO: NEGATIVE
NITRITE UR QL STRIP.AUTO: NEGATIVE
NRBC # BLD: 0 K/UL (ref 0–0.01)
NRBC BLD-RTO: 0 PER 100 WBC
PH UR STRIP: 6 (ref 5–8)
PH UR STRIP: 6.5 (ref 5–8)
PLATELET # BLD AUTO: 306 K/UL (ref 150–400)
PMV BLD AUTO: 10.7 FL (ref 8.9–12.9)
POTASSIUM SERPL-SCNC: 3.8 MMOL/L (ref 3.5–5.1)
PROT SERPL-MCNC: 7.9 G/DL (ref 6.4–8.2)
PROT UR STRIP-MCNC: 100 MG/DL
PROT UR STRIP-MCNC: 30 MG/DL
RBC # BLD AUTO: 4.5 M/UL (ref 3.8–5.2)
RBC #/AREA URNS HPF: >100 /HPF (ref 0–5)
RBC #/AREA URNS HPF: ABNORMAL /HPF (ref 0–5)
SODIUM SERPL-SCNC: 138 MMOL/L (ref 136–145)
SP GR UR REFRACTOMETRY: 1.01 (ref 1–1.03)
SP GR UR REFRACTOMETRY: 1.02 (ref 1–1.03)
SPECIMEN EXP DATE BLD: NORMAL
UROBILINOGEN UR QL STRIP.AUTO: 1 EU/DL (ref 0.2–1)
UROBILINOGEN UR QL STRIP.AUTO: 1 EU/DL (ref 0.2–1)
WBC # BLD AUTO: 11.9 K/UL (ref 3.6–11)
WBC URNS QL MICRO: ABNORMAL /HPF (ref 0–4)
WBC URNS QL MICRO: ABNORMAL /HPF (ref 0–4)

## 2025-06-08 PROCEDURE — 76801 OB US < 14 WKS SINGLE FETUS: CPT

## 2025-06-08 PROCEDURE — 99284 EMERGENCY DEPT VISIT MOD MDM: CPT

## 2025-06-08 PROCEDURE — 81001 URINALYSIS AUTO W/SCOPE: CPT

## 2025-06-08 PROCEDURE — 80053 COMPREHEN METABOLIC PANEL: CPT

## 2025-06-08 PROCEDURE — 84702 CHORIONIC GONADOTROPIN TEST: CPT

## 2025-06-08 PROCEDURE — 36415 COLL VENOUS BLD VENIPUNCTURE: CPT

## 2025-06-08 PROCEDURE — 86900 BLOOD TYPING SEROLOGIC ABO: CPT

## 2025-06-08 PROCEDURE — 85025 COMPLETE CBC W/AUTO DIFF WBC: CPT

## 2025-06-08 PROCEDURE — 86901 BLOOD TYPING SEROLOGIC RH(D): CPT

## 2025-06-08 PROCEDURE — 2580000003 HC RX 258

## 2025-06-08 PROCEDURE — 6370000000 HC RX 637 (ALT 250 FOR IP)

## 2025-06-08 PROCEDURE — 86850 RBC ANTIBODY SCREEN: CPT

## 2025-06-08 RX ORDER — 0.9 % SODIUM CHLORIDE 0.9 %
1000 INTRAVENOUS SOLUTION INTRAVENOUS ONCE
Status: COMPLETED | OUTPATIENT
Start: 2025-06-08 | End: 2025-06-08

## 2025-06-08 RX ORDER — ACETAMINOPHEN 160 MG/5ML
650 LIQUID ORAL ONCE
Status: COMPLETED | OUTPATIENT
Start: 2025-06-08 | End: 2025-06-08

## 2025-06-08 RX ORDER — AMOXICILLIN AND CLAVULANATE POTASSIUM 250; 62.5 MG/5ML; MG/5ML
500 POWDER, FOR SUSPENSION ORAL 3 TIMES DAILY
Qty: 210 ML | Refills: 0 | Status: SHIPPED | OUTPATIENT
Start: 2025-06-08 | End: 2025-06-15

## 2025-06-08 RX ORDER — ACETAMINOPHEN 500 MG
1000 TABLET ORAL
Status: DISCONTINUED | OUTPATIENT
Start: 2025-06-08 | End: 2025-06-08

## 2025-06-08 RX ADMIN — SODIUM CHLORIDE 1000 ML: 0.9 INJECTION, SOLUTION INTRAVENOUS at 19:25

## 2025-06-08 RX ADMIN — ACETAMINOPHEN 650 MG: 650 SOLUTION ORAL at 21:10

## 2025-06-08 ASSESSMENT — PAIN DESCRIPTION - LOCATION: LOCATION: ABDOMEN

## 2025-06-08 ASSESSMENT — PAIN DESCRIPTION - ORIENTATION: ORIENTATION: LOWER

## 2025-06-08 ASSESSMENT — PAIN DESCRIPTION - DESCRIPTORS: DESCRIPTORS: CRAMPING

## 2025-06-08 ASSESSMENT — PAIN - FUNCTIONAL ASSESSMENT: PAIN_FUNCTIONAL_ASSESSMENT: 0-10

## 2025-06-08 ASSESSMENT — PAIN SCALES - GENERAL: PAINLEVEL_OUTOF10: 5

## 2025-06-08 NOTE — ED TRIAGE NOTES
Pt ambulatory to ED with c/o vaginal bleeding with clots and abdominal pain x 1 hour. Pt is approximately 11 weeks pregnant. LMP in March. Pt states she has not been through a full pad but has had heavy bleeding.    Denies any dizziness or lightheadedness.     Denies hx of miscarriages.

## 2025-06-08 NOTE — ED PROVIDER NOTES
MEDICATIONS:  New Prescriptions    AMOXICILLIN-CLAVULANATE (AUGMENTIN) 250-62.5 MG/5ML SUSPENSION    Take 10 mLs by mouth in the morning, at noon, and at bedtime for 7 days         (Please note that portions of this note were completed with a voice recognition program.  Efforts were made to edit the dictations but occasionally words are mis-transcribed.)    Deuce Dawson PA-C (electronically signed)  Emergency Attending Physician / Physician Assistant / Nurse Practitioner             Deuce Dawson PA-C  06/08/25 2200

## 2025-06-09 ENCOUNTER — ROUTINE PRENATAL (OUTPATIENT)
Age: 30
End: 2025-06-09

## 2025-06-09 VITALS
BODY MASS INDEX: 23.09 KG/M2 | SYSTOLIC BLOOD PRESSURE: 105 MMHG | HEART RATE: 100 BPM | DIASTOLIC BLOOD PRESSURE: 71 MMHG | WEIGHT: 122.2 LBS

## 2025-06-09 DIAGNOSIS — O09.90 SUPERVISION OF HIGH RISK PREGNANCY, ANTEPARTUM: Primary | ICD-10-CM

## 2025-06-09 DIAGNOSIS — Z87.51 HISTORY OF PRETERM DELIVERY: ICD-10-CM

## 2025-06-09 DIAGNOSIS — Z3A.12 12 WEEKS GESTATION OF PREGNANCY: ICD-10-CM

## 2025-06-09 PROCEDURE — 0502F SUBSEQUENT PRENATAL CARE: CPT | Performed by: OBSTETRICS & GYNECOLOGY

## 2025-06-09 NOTE — PROGRESS NOTES
Seen yesterday in ED due to vaginal bleeding. Was in choir at [employer] when it stated. No recent IC. US normal in ED.  Now just old spotting  NIPTS/[facility] today    Cervix closed  Old blood in vagina  Rh pos    Precautions given   Has MFM 6/18

## 2025-06-09 NOTE — ED NOTES
Discharge instructions reviewed w/ the pt. Pt verbalized understanding of instructions and all questions were answered by the time of discharge.     Pt is ambulatory w/ no difficulty at the

## 2025-06-09 NOTE — DISCHARGE INSTRUCTIONS
We discussed your results today. It is important for you to follow up with your OBGYN for further evaluation and management. Return to the emergency department for worsening symptoms.

## 2025-06-09 NOTE — PROGRESS NOTES
Patient Active Problem List    Diagnosis Date Noted    Supervision of high risk pregnancy, antepartum 2025     Overview Note:     EDC 2025 D=US  NIPTS  [facility]  Hx of  birth - PTL 30 weeks  MFM  N/V of pregnancy - 20# weight loss  Measles NONIMMUNE

## 2025-06-13 DIAGNOSIS — Z34.90 PREGNANCY, UNSPECIFIED GESTATIONAL AGE: Primary | ICD-10-CM

## 2025-06-16 ENCOUNTER — RESULTS FOLLOW-UP (OUTPATIENT)
Age: 30
End: 2025-06-16

## 2025-06-16 DIAGNOSIS — O09.90 SUPERVISION OF HIGH RISK PREGNANCY, ANTEPARTUM: Primary | ICD-10-CM

## 2025-06-18 ENCOUNTER — ROUTINE PRENATAL (OUTPATIENT)
Age: 30
End: 2025-06-18
Payer: MEDICAID

## 2025-06-18 VITALS — HEART RATE: 80 BPM | SYSTOLIC BLOOD PRESSURE: 103 MMHG | DIASTOLIC BLOOD PRESSURE: 67 MMHG

## 2025-06-18 DIAGNOSIS — Z34.90 PREGNANCY, UNSPECIFIED GESTATIONAL AGE: ICD-10-CM

## 2025-06-18 PROCEDURE — 76813 OB US NUCHAL MEAS 1 GEST: CPT | Performed by: OBSTETRICS & GYNECOLOGY

## 2025-06-18 PROCEDURE — 99203 OFFICE O/P NEW LOW 30 MIN: CPT | Performed by: OBSTETRICS & GYNECOLOGY

## 2025-06-18 NOTE — PROCEDURES
PATIENT: PADILLA RUCKER   -  : 1995   -  DOS:2025   -  INTERPRETING PROVIDER:Kobe Michel,   Indication  ========    History of  delivery @30 weeks. Vaginal bleeding in first trimester    Method  ======    Transabdominal ultrasound examination. View: Good view    Pregnancy  =========    Sims pregnancy. Number of fetuses: 1    Dating  ======    LMP on: 3/17/2025  GA by LMP 13 w + 2 d  SUHAS by LMP: 2025  Previous Ultrasound on: 2025  Type of prior assessment: GA  GA at prior assessment date 8 w + 0 d  GA by previous U/S 13 w + 2 d  SUHAS by previous Ultrasound: 2025  Ultrasound examination on: 2025  GA by U/S based upon: CRL  GA by U/S 13 w + 3 d  SUHAS by U/S: 2025  Assigned: based on the LMP, selected on 2025  Assigned GA 13 w + 2 d  Assigned SUHAS: 2025    General Evaluation  ==============    Cardiac activity present  Placenta: posterior  Amniotic fluid: normal amount    Fetal Biometry  ============    Standard   bpm  52% Nicolaides  CRL 73.1 mm 13w 3d 58% Hadlock  NT 2.20 mm  Extended  Nasal bone: present    Fetal Anatomy  ===========    The following structures appear normal:  Stomach. Bladder.    The following structures were visualized:  Cranium: Midline falx  Choroid plexus. Face: Profile. Abdominal wall: Intact. Arms. Legs.    Maternal Structures  ===============    Ovaries / Tubes / Adnexa  Rt ovary: Normal  Rt ovary D1 3.4 cm  Rt ovary D2 2.0 cm  Rt ovary D3 1.2 cm  Rt ovary Vol 4.3 cm³  Lt ovary: Normal  Lt ovary D1 2.3 cm  Lt ovary D2 2.1 cm  Lt ovary D3 1.4 cm  Lt ovary Vol 3.6 cm³    Findings  =======    Intrauterine Sims pregnancy at 13w 2d by clinical dates.  NT measures 2.2 mm.  Anatomy visualized as stated above.  Placental site is posterior.    There appears to be an anechoic area within the left lower quadrant, outside of the membrane, but within the gestational sac, measuring 2.6 x 0.8 x 2.5 cm. Appearance  c/w uterine

## 2025-06-18 NOTE — PROGRESS NOTES
Patient was seen 6/18/2025      Please look under media to view full consult and ultrasound report in View[employer].         Kobe Michel MD  Maternal Fetal Medicine

## 2025-06-22 LAB
EGFR GENE MUT TESTED BLD/T: NEGATIVE
KRAS GENE MUT ANL BLD/T: NEGATIVE
Lab: ABNORMAL
Lab: POSITIVE
MICROARRAY PLATFORM: NEGATIVE
NTRA ALPHA-THALASSEMIA: NEGATIVE
NTRA BATTEN DISEASE (NEURONAL CEROID LIPOFUSCINOSIS, CLN3-RELATED): NEGATIVE
NTRA BETA-HEMOGLOBINOPATHIES: NEGATIVE
NTRA BLOOM SYNDROME: NEGATIVE
NTRA CANAVAN DISEASE: NEGATIVE
NTRA CITRULLINEMIA, TYPE I: NEGATIVE
NTRA CYSTIC FIBROSIS: NEGATIVE
NTRA DUCHENNE/BECKER MUSCULAR DYSTROPHY: NEGATIVE
NTRA FAMILIAL DYSAUTONOMIA: NEGATIVE
NTRA FANCONI ANEMIA, GROUP C: NEGATIVE
NTRA FRAGILE X SYNDROME: NEGATIVE
NTRA GALACTOSEMIA: NEGATIVE
NTRA GAUCHER DISEASE: NEGATIVE
NTRA GLYCOGEN STORAGE DISEASE, TYPE 1A: NEGATIVE
NTRA ISOVALERIC ACIDEMIA: NEGATIVE
NTRA MEDIUM CHAIN ACYL-COA DEHYDROGENASE DEFICIENCY: NEGATIVE
NTRA METHYLMALONIC ACIDURIA AND HOMOCYSTINURIA, TYPE CBLC: NEGATIVE
NTRA MUCOLIPIDOSIS, TYPE IV: NEGATIVE
NTRA POLYCYSTIC KIDNEY DISEASE, AUTOSOMAL RECESSIVE: POSITIVE
NTRA SMITH-LEMLI-OPITZ SYNDROME: NEGATIVE
NTRA SPINAL MUSCULAR ATROPHY: NEGATIVE
NTRA TAY-SACHS DISEASE: NEGATIVE
NTRA TYROSINEMIA, TYPE I: NEGATIVE
NTRA ZELLWEGER SPECTRUM DISORDERS, PEX1-RELATED: NEGATIVE

## 2025-07-02 ENCOUNTER — TELEPHONE (OUTPATIENT)
Age: 30
End: 2025-07-02

## 2025-07-07 ENCOUNTER — ROUTINE PRENATAL (OUTPATIENT)
Age: 30
End: 2025-07-07

## 2025-07-07 VITALS
SYSTOLIC BLOOD PRESSURE: 105 MMHG | BODY MASS INDEX: 23.24 KG/M2 | DIASTOLIC BLOOD PRESSURE: 63 MMHG | WEIGHT: 123 LBS | HEART RATE: 100 BPM

## 2025-07-07 DIAGNOSIS — O09.90 SUPERVISION OF HIGH RISK PREGNANCY, ANTEPARTUM: Primary | ICD-10-CM

## 2025-07-07 DIAGNOSIS — Z87.51 HISTORY OF PRETERM DELIVERY: ICD-10-CM

## 2025-07-07 DIAGNOSIS — Z3A.16 16 WEEKS GESTATION OF PREGNANCY: ICD-10-CM

## 2025-07-07 PROCEDURE — 0502F SUBSEQUENT PRENATAL CARE: CPT | Performed by: OBSTETRICS & GYNECOLOGY

## 2025-07-07 NOTE — PROGRESS NOTES
Doing well  Has fu with MFM this week - they are thinking possible abruption with first baby - I was there, not an abruption. Seen at 28 weeks with PTL and received steroids. Presented again 30 weeks and delivered. Could not swallow nifedipine for tocolysis.     AFP today   will come for [facility] next visit

## 2025-07-07 NOTE — PROGRESS NOTES
Patient Active Problem List    Diagnosis Date Noted    Supervision of high risk pregnancy, antepartum 2025     Overview Note:     EDC 2025 D=US  NIPTS normal male  [facility] pos polycystic kidney disease  Hx of  birth - PTL 30 weeks  MFM  N/V of pregnancy - 20# weight loss  Measles NONIMMUNE

## 2025-07-09 LAB
AFP INTERP SERPL-IMP: NORMAL
AFP INTERP SERPL-IMP: NORMAL
AFP MOM SERPL: 0.7
AFP SERPL QL: NORMAL
AFP SERPL-MCNC: 26.6 NG/ML
AGE AT DELIVERY: 28.9 YR
GA METHOD: NORMAL
GA: 16 WEEKS
IDDM PATIENT QL: NO
MULTIPLE PREGNANCY: NO
NEURAL TUBE DEFECT RISK FETUS: NORMAL %
SERVICE CMNT-IMP: NORMAL

## 2025-07-10 ENCOUNTER — ROUTINE PRENATAL (OUTPATIENT)
Age: 30
End: 2025-07-10
Payer: MEDICAID

## 2025-07-10 VITALS — DIASTOLIC BLOOD PRESSURE: 62 MMHG | SYSTOLIC BLOOD PRESSURE: 103 MMHG | HEART RATE: 67 BPM

## 2025-07-10 DIAGNOSIS — Z34.90 PREGNANCY, UNSPECIFIED GESTATIONAL AGE: ICD-10-CM

## 2025-07-10 PROCEDURE — 99213 OFFICE O/P EST LOW 20 MIN: CPT | Performed by: OBSTETRICS & GYNECOLOGY

## 2025-07-10 PROCEDURE — 76811 OB US DETAILED SNGL FETUS: CPT | Performed by: OBSTETRICS & GYNECOLOGY

## 2025-07-10 PROCEDURE — 76817 TRANSVAGINAL US OBSTETRIC: CPT | Performed by: OBSTETRICS & GYNECOLOGY

## 2025-07-10 NOTE — PROGRESS NOTES
Patient was seen 7/10/2025      Please look under media to view full consult and ultrasound report in [employer].         Kobe Michel MD  Maternal Fetal Medicine

## 2025-07-10 NOTE — PROCEDURES
appropriate distance from the internal os.  Transverse, head to maternal left presentation.  Transvaginal ultrasound was performed to assess cervical length. The cervix measures 3.63 cm in length. There is no evidence of funneling with and without fundal pressure.  Inferior tip of the placenta measures 2.2 cm from the internal os.    Detailed anatomic survey was performed and visualized anatomy appears normal, however certain structures were suboptimal due to early gestational age.    The ultrasound findings as listed above and diagnostic limitations of ultrasound imaging, including inability to exclude all anomalies, have been reviewed with the patient. All  questions and concerns addressed.    Consultation  ==========    ANA MARÍA is 30 yrs of age,  at 16w 3d.  No PMH of note    In her previous pregnancy, she presented to [facility] with  contractions on . The patient reported persistent bleeding during the pregnancy noted that she  presented vaginal bleeding on  (does not appear to be documented in admit hP & P). She ultimately progressed quickly in cervical dilation and delivered via     Placental pathology:  * * *FINAL PATHOLOGIC DIAGNOSIS* * *    Placenta, delivery (234 g):  Third trimester placenta with three vessel umbilical cord.  No evidence for infarction.    Today's counselling:  (1) Reviewed concern for placental abruption as etiology, given patient reported history of vaginal bleeding upon admission  (2) Reviewed recurrence risks of ~10-15% if diagnosis was placental abruption  (3) Reviewed differential diagnosis of PTB and recurrence risks  (4) Reviewed small overlap with 2nd trimester cervical issues and PTB.  (5) Reviewed that vaginal progesterone may be considered as a treatment option for patients with a history of  birth, layton gestation, and a shortened cervix.  However, vaginal progesterone has not been proven effective in the absence of a shortened cervix.  (6)

## 2025-07-24 ENCOUNTER — ROUTINE PRENATAL (OUTPATIENT)
Age: 30
End: 2025-07-24
Payer: MEDICAID

## 2025-07-24 VITALS — SYSTOLIC BLOOD PRESSURE: 98 MMHG | HEART RATE: 88 BPM | DIASTOLIC BLOOD PRESSURE: 61 MMHG

## 2025-07-24 DIAGNOSIS — Z34.90 PREGNANCY, UNSPECIFIED GESTATIONAL AGE: ICD-10-CM

## 2025-07-24 PROCEDURE — 76817 TRANSVAGINAL US OBSTETRIC: CPT | Performed by: STUDENT IN AN ORGANIZED HEALTH CARE EDUCATION/TRAINING PROGRAM

## 2025-07-24 PROCEDURE — 99213 OFFICE O/P EST LOW 20 MIN: CPT | Performed by: STUDENT IN AN ORGANIZED HEALTH CARE EDUCATION/TRAINING PROGRAM

## 2025-07-24 PROCEDURE — 76816 OB US FOLLOW-UP PER FETUS: CPT | Performed by: STUDENT IN AN ORGANIZED HEALTH CARE EDUCATION/TRAINING PROGRAM

## 2025-07-24 NOTE — PROCEDURES
PATIENT: PADILLA RUCKER   -  : 1995   -  DOS:2025   -  INTERPRETING PROVIDER:Renu Gallardo,   Indication  ========    History of  delivery @30 weeks. Vaginal bleeding in first trimester    Method  ======    Transabdominal and transvaginal ultrasound examination. View: Good view    Pregnancy  =========    Sims pregnancy. Number of fetuses: 1    Dating  ======    LMP on: 3/17/2025  GA by LMP 18 w + 3 d  SUHAS by LMP: 2025  Previous Ultrasound on: 2025  Type of prior assessment: GA  GA at prior assessment date 8 w + 0 d  GA by previous U/S 18 w + 3 d  SUHAS by previous Ultrasound: 2025  Assigned: based on the LMP, selected on 2025  Assigned GA 18 w + 3 d  Assigned SUHAS: 2025    Fetal Biometry  ============    Standard  EFW by: Hadlock ([id number])  Extended  Rt Renal pelvis ap 3.0 mm  Lt Renal pelvis ap 2.2 mm  Other Structures   bpm    General Evaluation  ==============    Cardiac activity present.  bpm. Fetal movements: visualized. Presentation: BREECH  Placenta: Placental site: posterior, appropriate distance from the internal os  Umbilical cord: Cord vessels: 3 vessel cord. Insertion site: central  Amniotic fluid: Amount of AF: normal. MVP 5.4 cm    Fetal Anatomy  ===========    Cavum septi pellucidi: normal  4-chamber view: normal  RVOT view: normal  LVOT view: normal  3-vessel view: normal  3-vessel-trachea view: normal  Heart / Thorax  Interventricular septum: normal  Stomach: normal  Kidneys: normal  Bladder: normal  Rt arm: normal  Rt hand: normal  Rt fingers: normal  Wants to know fetal sex: yes    Maternal Structures  ===============    Uterus / Cervix  Cervix: Normal  Approach: Transvaginal  Cervical length 3.43 cm  Other: assess cervical length    Findings  =======    Intrauterine Sims pregnancy at 18w 3d by clinical dates.  EFW is ... at ..., abdominal circumference at ....  Amniotic fluid: normal.  Placenta is posterior,

## 2025-08-04 ENCOUNTER — ROUTINE PRENATAL (OUTPATIENT)
Age: 30
End: 2025-08-04

## 2025-08-04 VITALS — SYSTOLIC BLOOD PRESSURE: 116 MMHG | WEIGHT: 126.4 LBS | DIASTOLIC BLOOD PRESSURE: 74 MMHG | BODY MASS INDEX: 23.88 KG/M2

## 2025-08-04 DIAGNOSIS — O09.90 SUPERVISION OF HIGH RISK PREGNANCY, ANTEPARTUM: Primary | ICD-10-CM

## 2025-08-04 DIAGNOSIS — Z87.51 HISTORY OF PRETERM DELIVERY: ICD-10-CM

## 2025-08-04 DIAGNOSIS — Z3A.20 20 WEEKS GESTATION OF PREGNANCY: ICD-10-CM

## 2025-08-04 PROCEDURE — 0502F SUBSEQUENT PRENATAL CARE: CPT | Performed by: OBSTETRICS & GYNECOLOGY

## 2025-08-07 ENCOUNTER — ROUTINE PRENATAL (OUTPATIENT)
Age: 30
End: 2025-08-07
Payer: MEDICAID

## 2025-08-07 VITALS — DIASTOLIC BLOOD PRESSURE: 58 MMHG | SYSTOLIC BLOOD PRESSURE: 98 MMHG | HEART RATE: 83 BPM

## 2025-08-07 DIAGNOSIS — Z34.90 PREGNANCY, UNSPECIFIED GESTATIONAL AGE: ICD-10-CM

## 2025-08-07 PROCEDURE — 99214 OFFICE O/P EST MOD 30 MIN: CPT | Performed by: STUDENT IN AN ORGANIZED HEALTH CARE EDUCATION/TRAINING PROGRAM

## 2025-08-07 PROCEDURE — 76816 OB US FOLLOW-UP PER FETUS: CPT | Performed by: STUDENT IN AN ORGANIZED HEALTH CARE EDUCATION/TRAINING PROGRAM

## 2025-08-20 ENCOUNTER — ROUTINE PRENATAL (OUTPATIENT)
Age: 30
End: 2025-08-20
Payer: MEDICAID

## 2025-08-20 VITALS — HEART RATE: 76 BPM | SYSTOLIC BLOOD PRESSURE: 106 MMHG | DIASTOLIC BLOOD PRESSURE: 62 MMHG

## 2025-08-20 DIAGNOSIS — Z34.90 PREGNANCY, UNSPECIFIED GESTATIONAL AGE: ICD-10-CM

## 2025-08-20 DIAGNOSIS — O09.899 HISTORY OF PRETERM DELIVERY, CURRENTLY PREGNANT: Primary | ICD-10-CM

## 2025-08-20 PROCEDURE — 76815 OB US LIMITED FETUS(S): CPT | Performed by: OBSTETRICS & GYNECOLOGY

## 2025-08-20 PROCEDURE — 76817 TRANSVAGINAL US OBSTETRIC: CPT | Performed by: OBSTETRICS & GYNECOLOGY

## 2025-09-02 ENCOUNTER — ROUTINE PRENATAL (OUTPATIENT)
Age: 30
End: 2025-09-02

## 2025-09-02 VITALS — WEIGHT: 133.8 LBS | DIASTOLIC BLOOD PRESSURE: 80 MMHG | BODY MASS INDEX: 25.28 KG/M2 | SYSTOLIC BLOOD PRESSURE: 116 MMHG

## 2025-09-02 DIAGNOSIS — Z87.51 HISTORY OF PRETERM DELIVERY: ICD-10-CM

## 2025-09-02 DIAGNOSIS — O09.90 SUPERVISION OF HIGH RISK PREGNANCY, ANTEPARTUM: Primary | ICD-10-CM

## 2025-09-02 DIAGNOSIS — Z3A.24 24 WEEKS GESTATION OF PREGNANCY: ICD-10-CM

## 2025-09-02 PROCEDURE — 0502F SUBSEQUENT PRENATAL CARE: CPT | Performed by: OBSTETRICS & GYNECOLOGY

## 2025-09-05 ENCOUNTER — ROUTINE PRENATAL (OUTPATIENT)
Age: 30
End: 2025-09-05
Payer: MEDICAID

## 2025-09-05 VITALS — HEART RATE: 88 BPM | SYSTOLIC BLOOD PRESSURE: 93 MMHG | DIASTOLIC BLOOD PRESSURE: 60 MMHG

## 2025-09-05 DIAGNOSIS — O40.2XX0: ICD-10-CM

## 2025-09-05 DIAGNOSIS — Z36.89 ENCOUNTER FOR ULTRASOUND TO ASSESS INTERVAL GROWTH OF FETUS: Primary | ICD-10-CM

## 2025-09-05 DIAGNOSIS — Z14.8 GENETIC CARRIER: ICD-10-CM

## 2025-09-05 DIAGNOSIS — O09.899 HISTORY OF PRETERM DELIVERY, CURRENTLY PREGNANT: ICD-10-CM

## 2025-09-05 DIAGNOSIS — Z34.90 PREGNANCY, UNSPECIFIED GESTATIONAL AGE: ICD-10-CM

## 2025-09-05 PROCEDURE — 99213 OFFICE O/P EST LOW 20 MIN: CPT

## 2025-09-05 PROCEDURE — 76816 OB US FOLLOW-UP PER FETUS: CPT | Performed by: STUDENT IN AN ORGANIZED HEALTH CARE EDUCATION/TRAINING PROGRAM

## 2025-09-05 PROCEDURE — 76817 TRANSVAGINAL US OBSTETRIC: CPT | Performed by: STUDENT IN AN ORGANIZED HEALTH CARE EDUCATION/TRAINING PROGRAM

## (undated) DEVICE — INSUFFLATION NEEDLE: Brand: SURGINEEDLE

## (undated) DEVICE — DEVICE TRNSF SPIK STL 2008S] MICROTEK MEDICAL INC]

## (undated) DEVICE — RELOAD STPL L45MM H1-2.6MM MESENTERY THN TISS WHT GRIPPING

## (undated) DEVICE — APPLIER CLP M/L SHFT DIA5MM 15 LIG LIGAMAX 5

## (undated) DEVICE — SOL IRRIGATION INJ NACL 0.9% 500ML BTL

## (undated) DEVICE — STERILE POLYISOPRENE POWDER-FREE SURGICAL GLOVES: Brand: PROTEXIS

## (undated) DEVICE — SYR 10ML LUER LOK 1/5ML GRAD --

## (undated) DEVICE — LAPAROSCOPIC WIRE L-HOOK ELECTRODE COATED: Brand: CLEANCOAT

## (undated) DEVICE — BLADE ASSEMB CLP HAIR FINE --

## (undated) DEVICE — COVER LT HNDL PLAS RIG 1 PER PK

## (undated) DEVICE — INFECTION CONTROL KIT SYS

## (undated) DEVICE — STRAP,POSITIONING,KNEE/BODY,FOAM,4X60": Brand: MEDLINE

## (undated) DEVICE — CANISTER, RIGID, 3000CC: Brand: MEDLINE INDUSTRIES, INC.

## (undated) DEVICE — TROCAR: Brand: KII® OPTICAL ACCESS SYSTEM

## (undated) DEVICE — LAPAROSCOPIC TROCAR SLEEVE/SINGLE USE: Brand: KII® OPTICAL ACCESS SYSTEM

## (undated) DEVICE — DERMABOND SKIN ADH 0.7ML -- DERMABOND ADVANCED 12/BX

## (undated) DEVICE — TISSUE RETRIEVAL SYSTEM: Brand: INZII RETRIEVAL SYSTEM

## (undated) DEVICE — TROCAR: Brand: KII® SLEEVE

## (undated) DEVICE — REM POLYHESIVE ADULT PATIENT RETURN ELECTRODE: Brand: VALLEYLAB

## (undated) DEVICE — SUTURE SZ 0 27IN 5/8 CIR UR-6  TAPER PT VIOLET ABSRB VICRYL J603H

## (undated) DEVICE — RELOAD STPL L45MM H1.5-3.6MM REG TISS BLU GRIPPING SURF B

## (undated) DEVICE — STAPLER INT L340MM 45MM STD 12 FIRING B FRM PWR + GRIPPING

## (undated) DEVICE — PREP SKN CHLRAPRP APL 26ML STR --

## (undated) DEVICE — SUTURE VCRL SZ 4-0 L27IN ABSRB UD L19MM PS-2 3/8 CIR PRIM J426H

## (undated) DEVICE — NEEDLE HYPO 22GA L1.5IN BLK S STL HUB POLYPR SHLD REG BVL

## (undated) DEVICE — SURGICAL PROCEDURE KIT GEN LAPAROSCOPY LF